# Patient Record
Sex: MALE | Race: WHITE | Employment: UNEMPLOYED | URBAN - METROPOLITAN AREA
[De-identification: names, ages, dates, MRNs, and addresses within clinical notes are randomized per-mention and may not be internally consistent; named-entity substitution may affect disease eponyms.]

---

## 2017-01-01 ENCOUNTER — APPOINTMENT (OUTPATIENT)
Dept: GENERAL RADIOLOGY | Age: 62
DRG: 190 | End: 2017-01-01
Attending: FAMILY MEDICINE
Payer: COMMERCIAL

## 2017-01-01 ENCOUNTER — APPOINTMENT (OUTPATIENT)
Dept: CT IMAGING | Age: 62
End: 2017-01-01
Attending: EMERGENCY MEDICINE
Payer: COMMERCIAL

## 2017-01-01 ENCOUNTER — HOSPITAL ENCOUNTER (OUTPATIENT)
Dept: GENERAL RADIOLOGY | Age: 62
Discharge: HOME | End: 2017-07-11
Payer: COMMERCIAL

## 2017-01-01 ENCOUNTER — APPOINTMENT (OUTPATIENT)
Dept: CT IMAGING | Age: 62
DRG: 190 | End: 2017-01-01
Attending: INTERNAL MEDICINE
Payer: COMMERCIAL

## 2017-01-01 ENCOUNTER — APPOINTMENT (OUTPATIENT)
Dept: MRI IMAGING | Age: 62
DRG: 190 | End: 2017-01-01
Attending: INTERNAL MEDICINE
Payer: COMMERCIAL

## 2017-01-01 ENCOUNTER — HOSPITAL ENCOUNTER (OUTPATIENT)
Dept: MRI IMAGING | Age: 62
Discharge: HOME | End: 2017-02-09
Attending: INTERNAL MEDICINE
Payer: COMMERCIAL

## 2017-01-01 ENCOUNTER — HOSPITAL ENCOUNTER (EMERGENCY)
Age: 62
Discharge: HOME | End: 2017-10-09
Attending: EMERGENCY MEDICINE | Admitting: EMERGENCY MEDICINE
Payer: COMMERCIAL

## 2017-01-01 ENCOUNTER — APPOINTMENT (OUTPATIENT)
Dept: GENERAL RADIOLOGY | Age: 62
DRG: 190 | End: 2017-01-01
Attending: EMERGENCY MEDICINE
Payer: COMMERCIAL

## 2017-01-01 ENCOUNTER — OFFICE VISIT (OUTPATIENT)
Dept: INTERNAL MEDICINE CLINIC | Age: 62
End: 2017-01-01

## 2017-01-01 ENCOUNTER — APPOINTMENT (OUTPATIENT)
Dept: CT IMAGING | Age: 62
DRG: 190 | End: 2017-01-01
Attending: EMERGENCY MEDICINE
Payer: COMMERCIAL

## 2017-01-01 ENCOUNTER — TELEPHONE (OUTPATIENT)
Dept: INTERNAL MEDICINE CLINIC | Age: 62
End: 2017-01-01

## 2017-01-01 ENCOUNTER — TELEPHONE (OUTPATIENT)
Dept: CASE MANAGEMENT | Age: 62
End: 2017-01-01

## 2017-01-01 ENCOUNTER — APPOINTMENT (OUTPATIENT)
Dept: GENERAL RADIOLOGY | Age: 62
End: 2017-01-01
Attending: EMERGENCY MEDICINE
Payer: COMMERCIAL

## 2017-01-01 ENCOUNTER — HOSPITAL ENCOUNTER (EMERGENCY)
Age: 62
Discharge: HOME | End: 2017-07-29
Attending: EMERGENCY MEDICINE
Payer: COMMERCIAL

## 2017-01-01 ENCOUNTER — HOSPITAL ENCOUNTER (INPATIENT)
Age: 62
LOS: 11 days | Discharge: NURSING, REHAB OR LONG-TERM CARE | DRG: 190 | End: 2017-11-16
Attending: EMERGENCY MEDICINE | Admitting: INTERNAL MEDICINE
Payer: COMMERCIAL

## 2017-01-01 VITALS
RESPIRATION RATE: 18 BRPM | BODY MASS INDEX: 22.31 KG/M2 | OXYGEN SATURATION: 96 % | TEMPERATURE: 97.3 F | HEART RATE: 100 BPM | WEIGHT: 164.7 LBS | SYSTOLIC BLOOD PRESSURE: 127 MMHG | DIASTOLIC BLOOD PRESSURE: 77 MMHG | HEIGHT: 72 IN

## 2017-01-01 VITALS
BODY MASS INDEX: 23.05 KG/M2 | TEMPERATURE: 97.8 F | SYSTOLIC BLOOD PRESSURE: 120 MMHG | RESPIRATION RATE: 16 BRPM | HEIGHT: 72 IN | OXYGEN SATURATION: 96 % | HEART RATE: 88 BPM | DIASTOLIC BLOOD PRESSURE: 77 MMHG | WEIGHT: 170.2 LBS

## 2017-01-01 VITALS
WEIGHT: 157 LBS | BODY MASS INDEX: 21.26 KG/M2 | OXYGEN SATURATION: 97 % | HEART RATE: 74 BPM | RESPIRATION RATE: 24 BRPM | HEIGHT: 72 IN | TEMPERATURE: 97.6 F | DIASTOLIC BLOOD PRESSURE: 76 MMHG | SYSTOLIC BLOOD PRESSURE: 121 MMHG

## 2017-01-01 VITALS
HEIGHT: 72 IN | OXYGEN SATURATION: 97 % | HEART RATE: 96 BPM | TEMPERATURE: 98 F | RESPIRATION RATE: 18 BRPM | BODY MASS INDEX: 21.8 KG/M2 | WEIGHT: 160.94 LBS | DIASTOLIC BLOOD PRESSURE: 80 MMHG | SYSTOLIC BLOOD PRESSURE: 136 MMHG

## 2017-01-01 VITALS
WEIGHT: 157.6 LBS | HEART RATE: 87 BPM | SYSTOLIC BLOOD PRESSURE: 103 MMHG | BODY MASS INDEX: 21.35 KG/M2 | DIASTOLIC BLOOD PRESSURE: 67 MMHG | HEIGHT: 72 IN | OXYGEN SATURATION: 95 % | TEMPERATURE: 97.7 F | RESPIRATION RATE: 16 BRPM

## 2017-01-01 DIAGNOSIS — J44.1 COPD EXACERBATION (HCC): ICD-10-CM

## 2017-01-01 DIAGNOSIS — C34.11 MALIGNANT NEOPLASM OF UPPER LOBE OF RIGHT LUNG (HCC): ICD-10-CM

## 2017-01-01 DIAGNOSIS — L08.9 SKIN INFECTION: ICD-10-CM

## 2017-01-01 DIAGNOSIS — R51.9 HEADACHE: ICD-10-CM

## 2017-01-01 DIAGNOSIS — E87.1 HYPONATREMIA: ICD-10-CM

## 2017-01-01 DIAGNOSIS — R55 SYNCOPE AND COLLAPSE: ICD-10-CM

## 2017-01-01 DIAGNOSIS — I10 ESSENTIAL HYPERTENSION: Primary | ICD-10-CM

## 2017-01-01 DIAGNOSIS — J15.8 PNEUMONIA DUE TO OTHER SPECIFIED BACTERIA (HCC): ICD-10-CM

## 2017-01-01 DIAGNOSIS — C34.11 MALIGNANT NEOPLASM OF UPPER LOBE OF RIGHT LUNG (HCC): Primary | ICD-10-CM

## 2017-01-01 DIAGNOSIS — C78.1 SECONDARY MALIGNANT NEOPLASM OF MEDIASTINUM (HCC): ICD-10-CM

## 2017-01-01 DIAGNOSIS — R42 DIZZINESS: ICD-10-CM

## 2017-01-01 DIAGNOSIS — J44.1 ACUTE EXACERBATION OF CHRONIC OBSTRUCTIVE PULMONARY DISEASE (COPD) (HCC): ICD-10-CM

## 2017-01-01 DIAGNOSIS — R06.02 SHORTNESS OF BREATH: ICD-10-CM

## 2017-01-01 DIAGNOSIS — E87.1 HYPONATREMIA: Primary | ICD-10-CM

## 2017-01-01 DIAGNOSIS — D70.1 AGRANULOCYTOSIS SECONDARY TO CANCER CHEMOTHERAPY (CODE) (HCC): ICD-10-CM

## 2017-01-01 DIAGNOSIS — R11.0 NAUSEA: ICD-10-CM

## 2017-01-01 DIAGNOSIS — J44.1 ACUTE EXACERBATION OF CHRONIC OBSTRUCTIVE PULMONARY DISEASE (COPD) (HCC): Primary | ICD-10-CM

## 2017-01-01 DIAGNOSIS — R51.9 HEAD ACHE: ICD-10-CM

## 2017-01-01 DIAGNOSIS — C34.90 MALIGNANT NEOPLASM OF LUNG, UNSPECIFIED LATERALITY, UNSPECIFIED PART OF LUNG (HCC): ICD-10-CM

## 2017-01-01 DIAGNOSIS — E53.8 B12 DEFICIENCY: ICD-10-CM

## 2017-01-01 DIAGNOSIS — J16.8: ICD-10-CM

## 2017-01-01 DIAGNOSIS — M54.40 MIDLINE LOW BACK PAIN WITH SCIATICA, SCIATICA LATERALITY UNSPECIFIED, UNSPECIFIED CHRONICITY: ICD-10-CM

## 2017-01-01 DIAGNOSIS — I10 ESSENTIAL HYPERTENSION: ICD-10-CM

## 2017-01-01 DIAGNOSIS — J44.9 CHRONIC OBSTRUCTIVE PULMONARY DISEASE, UNSPECIFIED COPD TYPE (HCC): ICD-10-CM

## 2017-01-01 DIAGNOSIS — R65.10 SIRS (SYSTEMIC INFLAMMATORY RESPONSE SYNDROME) (HCC): ICD-10-CM

## 2017-01-01 DIAGNOSIS — R53.81 DEBILITY: ICD-10-CM

## 2017-01-01 DIAGNOSIS — R42 DIZZINESS AND GIDDINESS: ICD-10-CM

## 2017-01-01 DIAGNOSIS — Z51.89 VISIT FOR WOUND CHECK: Primary | ICD-10-CM

## 2017-01-01 DIAGNOSIS — E78.5 HYPERLIPIDEMIA, UNSPECIFIED HYPERLIPIDEMIA TYPE: ICD-10-CM

## 2017-01-01 LAB
ALBUMIN SERPL-MCNC: 2.6 G/DL (ref 3.5–5)
ALBUMIN SERPL-MCNC: 2.9 G/DL (ref 3.5–5)
ALBUMIN SERPL-MCNC: 3.3 G/DL (ref 3.5–5)
ALBUMIN SERPL-MCNC: 3.6 G/DL (ref 3.5–5)
ALBUMIN SERPL-MCNC: 3.9 G/DL (ref 3.5–5.5)
ALBUMIN/GLOB SERPL: 0.8 {RATIO} (ref 1.1–2.2)
ALBUMIN/GLOB SERPL: 0.9 {RATIO} (ref 1.1–2.2)
ALBUMIN/GLOB SERPL: 1.6 {RATIO} (ref 1.2–2.2)
ALP SERPL-CCNC: 47 U/L (ref 45–117)
ALP SERPL-CCNC: 52 IU/L (ref 39–117)
ALP SERPL-CCNC: 53 U/L (ref 45–117)
ALP SERPL-CCNC: 62 U/L (ref 45–117)
ALP SERPL-CCNC: 66 U/L (ref 45–117)
ALT SERPL-CCNC: 11 IU/L (ref 0–44)
ALT SERPL-CCNC: 17 U/L (ref 12–78)
ALT SERPL-CCNC: 25 U/L (ref 12–78)
ALT SERPL-CCNC: 34 U/L (ref 12–78)
ALT SERPL-CCNC: 50 U/L (ref 12–78)
ANION GAP SERPL CALC-SCNC: 10 MMOL/L (ref 5–15)
ANION GAP SERPL CALC-SCNC: 10 MMOL/L (ref 5–15)
ANION GAP SERPL CALC-SCNC: 5 MMOL/L (ref 5–15)
ANION GAP SERPL CALC-SCNC: 8 MMOL/L (ref 5–15)
ANION GAP SERPL CALC-SCNC: 8 MMOL/L (ref 5–15)
APPEARANCE UR: CLEAR
ARTERIAL PATENCY WRIST A: YES
ARTERIAL PATENCY WRIST A: YES
AST SERPL-CCNC: 11 U/L (ref 15–37)
AST SERPL-CCNC: 17 IU/L (ref 0–40)
AST SERPL-CCNC: 17 U/L (ref 15–37)
AST SERPL-CCNC: 28 U/L (ref 15–37)
AST SERPL-CCNC: 33 U/L (ref 15–37)
ATRIAL RATE: 101 BPM
ATRIAL RATE: 92 BPM
BACTERIA SPEC CULT: NORMAL
BACTERIA SPEC CULT: NORMAL
BACTERIA URNS QL MICRO: NEGATIVE /HPF
BASE DEFICIT BLDA-SCNC: 1.4 MMOL/L
BASE DEFICIT BLDA-SCNC: 3.8 MMOL/L
BASOPHILS # BLD: 0 K/UL (ref 0–0.1)
BASOPHILS NFR BLD: 0 % (ref 0–1)
BDY SITE: ABNORMAL
BDY SITE: NORMAL
BILIRUB SERPL-MCNC: 0.3 MG/DL (ref 0.2–1)
BILIRUB SERPL-MCNC: 0.5 MG/DL (ref 0.2–1)
BILIRUB SERPL-MCNC: 0.5 MG/DL (ref 0–1.2)
BILIRUB UR QL: NEGATIVE
BUN SERPL-MCNC: 10 MG/DL (ref 6–20)
BUN SERPL-MCNC: 10 MG/DL (ref 6–20)
BUN SERPL-MCNC: 14 MG/DL (ref 6–20)
BUN SERPL-MCNC: 19 MG/DL (ref 6–20)
BUN SERPL-MCNC: 51 MG/DL (ref 6–20)
BUN SERPL-MCNC: 8 MG/DL (ref 6–24)
BUN/CREAT SERPL: 13 (ref 12–20)
BUN/CREAT SERPL: 19 (ref 12–20)
BUN/CREAT SERPL: 43 (ref 12–20)
BUN/CREAT SERPL: 7 (ref 9–20)
BUN/CREAT SERPL: 9 (ref 12–20)
BUN/CREAT SERPL: 9 (ref 12–20)
CALCIUM SERPL-MCNC: 7.8 MG/DL (ref 8.5–10.1)
CALCIUM SERPL-MCNC: 7.9 MG/DL (ref 8.5–10.1)
CALCIUM SERPL-MCNC: 8 MG/DL (ref 8.5–10.1)
CALCIUM SERPL-MCNC: 8.7 MG/DL (ref 8.5–10.1)
CALCIUM SERPL-MCNC: 9.1 MG/DL (ref 8.7–10.2)
CALCIUM SERPL-MCNC: 9.3 MG/DL (ref 8.5–10.1)
CALCULATED P AXIS, ECG09: 76 DEGREES
CALCULATED P AXIS, ECG09: 83 DEGREES
CALCULATED R AXIS, ECG10: 97 DEGREES
CALCULATED R AXIS, ECG10: 99 DEGREES
CALCULATED T AXIS, ECG11: 52 DEGREES
CALCULATED T AXIS, ECG11: 55 DEGREES
CHLORIDE SERPL-SCNC: 102 MMOL/L (ref 97–108)
CHLORIDE SERPL-SCNC: 102 MMOL/L (ref 97–108)
CHLORIDE SERPL-SCNC: 106 MMOL/L (ref 97–108)
CHLORIDE SERPL-SCNC: 93 MMOL/L (ref 96–106)
CHLORIDE SERPL-SCNC: 94 MMOL/L (ref 97–108)
CHLORIDE SERPL-SCNC: 96 MMOL/L (ref 97–108)
CHOLEST SERPL-MCNC: 183 MG/DL (ref 100–199)
CK MB CFR SERPL CALC: 3.2 % (ref 0–2.5)
CK MB SERPL-MCNC: 2.4 NG/ML (ref 5–25)
CK SERPL-CCNC: 32 U/L (ref 39–308)
CK SERPL-CCNC: 75 U/L (ref 39–308)
CO2 SERPL-SCNC: 22 MMOL/L (ref 18–29)
CO2 SERPL-SCNC: 22 MMOL/L (ref 21–32)
CO2 SERPL-SCNC: 26 MMOL/L (ref 21–32)
CO2 SERPL-SCNC: 26 MMOL/L (ref 21–32)
CO2 SERPL-SCNC: 27 MMOL/L (ref 21–32)
CO2 SERPL-SCNC: 28 MMOL/L (ref 21–32)
COLOR UR: NORMAL
CREAT SERPL-MCNC: 0.98 MG/DL (ref 0.7–1.3)
CREAT SERPL-MCNC: 1.05 MG/DL (ref 0.7–1.3)
CREAT SERPL-MCNC: 1.12 MG/DL (ref 0.7–1.3)
CREAT SERPL-MCNC: 1.15 MG/DL (ref 0.7–1.3)
CREAT SERPL-MCNC: 1.18 MG/DL (ref 0.76–1.27)
CREAT SERPL-MCNC: 1.18 MG/DL (ref 0.7–1.3)
DIAGNOSIS, 93000: NORMAL
DIAGNOSIS, 93000: NORMAL
DIFFERENTIAL METHOD BLD: ABNORMAL
DIFFERENTIAL METHOD BLD: ABNORMAL
EOSINOPHIL # BLD: 0 K/UL (ref 0–0.4)
EOSINOPHIL # BLD: 0.1 K/UL (ref 0–0.4)
EOSINOPHIL NFR BLD: 0 % (ref 0–7)
EOSINOPHIL NFR BLD: 1 % (ref 0–7)
EPITH CASTS URNS QL MICRO: NORMAL /LPF
ERYTHROCYTE [DISTWIDTH] IN BLOOD BY AUTOMATED COUNT: 13.3 % (ref 11.5–14.5)
ERYTHROCYTE [DISTWIDTH] IN BLOOD BY AUTOMATED COUNT: 13.4 % (ref 11.5–14.5)
ERYTHROCYTE [DISTWIDTH] IN BLOOD BY AUTOMATED COUNT: 13.5 % (ref 11.5–14.5)
ERYTHROCYTE [DISTWIDTH] IN BLOOD BY AUTOMATED COUNT: 13.6 % (ref 11.5–14.5)
ERYTHROCYTE [DISTWIDTH] IN BLOOD BY AUTOMATED COUNT: 13.7 % (ref 11.5–14.5)
FLUAV AG NPH QL IA: NEGATIVE
FLUBV AG NOSE QL IA: NEGATIVE
GLOBULIN SER CALC-MCNC: 2.4 G/DL (ref 1.5–4.5)
GLOBULIN SER CALC-MCNC: 3.1 G/DL (ref 2–4)
GLOBULIN SER CALC-MCNC: 3.3 G/DL (ref 2–4)
GLOBULIN SER CALC-MCNC: 3.5 G/DL (ref 2–4)
GLOBULIN SER CALC-MCNC: 4.2 G/DL (ref 2–4)
GLUCOSE SERPL-MCNC: 120 MG/DL (ref 65–100)
GLUCOSE SERPL-MCNC: 137 MG/DL (ref 65–100)
GLUCOSE SERPL-MCNC: 233 MG/DL (ref 65–100)
GLUCOSE SERPL-MCNC: 73 MG/DL (ref 65–100)
GLUCOSE SERPL-MCNC: 78 MG/DL (ref 65–99)
GLUCOSE SERPL-MCNC: 82 MG/DL (ref 65–100)
GLUCOSE UR STRIP.AUTO-MCNC: NEGATIVE MG/DL
HCO3 BLDA-SCNC: 20 MMOL/L (ref 22–26)
HCO3 BLDA-SCNC: 23 MMOL/L (ref 22–26)
HCT VFR BLD AUTO: 31 % (ref 36.6–50.3)
HCT VFR BLD AUTO: 34.2 % (ref 36.6–50.3)
HCT VFR BLD AUTO: 36.1 % (ref 36.6–50.3)
HCT VFR BLD AUTO: 38.7 % (ref 36.6–50.3)
HCT VFR BLD AUTO: 42.1 % (ref 36.6–50.3)
HDLC SERPL-MCNC: 120 MG/DL
HGB BLD-MCNC: 10.7 G/DL (ref 12.1–17)
HGB BLD-MCNC: 12 G/DL (ref 12.1–17)
HGB BLD-MCNC: 13 G/DL (ref 12.1–17)
HGB BLD-MCNC: 13.5 G/DL (ref 12.1–17)
HGB BLD-MCNC: 14.7 G/DL (ref 12.1–17)
HGB UR QL STRIP: NEGATIVE
HYALINE CASTS URNS QL MICRO: NORMAL /LPF (ref 0–5)
KETONES UR QL STRIP.AUTO: NEGATIVE MG/DL
LACTATE SERPL-SCNC: 1.4 MMOL/L (ref 0.4–2)
LACTATE SERPL-SCNC: 2.9 MMOL/L (ref 0.4–2)
LACTATE SERPL-SCNC: 3.9 MMOL/L (ref 0.4–2)
LDLC SERPL CALC-MCNC: 51 MG/DL (ref 0–99)
LEUKOCYTE ESTERASE UR QL STRIP.AUTO: NEGATIVE
LYMPHOCYTES # BLD: 0.2 K/UL (ref 0.8–3.5)
LYMPHOCYTES # BLD: 0.3 K/UL (ref 0.8–3.5)
LYMPHOCYTES # BLD: 0.3 K/UL (ref 0.8–3.5)
LYMPHOCYTES # BLD: 1.5 K/UL (ref 0.8–3.5)
LYMPHOCYTES # BLD: 1.5 K/UL (ref 0.8–3.5)
LYMPHOCYTES NFR BLD: 17 % (ref 12–49)
LYMPHOCYTES NFR BLD: 18 % (ref 12–49)
LYMPHOCYTES NFR BLD: 2 % (ref 12–49)
LYMPHOCYTES NFR BLD: 4 % (ref 12–49)
LYMPHOCYTES NFR BLD: 5 % (ref 12–49)
MAGNESIUM SERPL-MCNC: 1.7 MG/DL (ref 1.6–2.4)
MCH RBC QN AUTO: 33.3 PG (ref 26–34)
MCH RBC QN AUTO: 33.7 PG (ref 26–34)
MCH RBC QN AUTO: 34.6 PG (ref 26–34)
MCHC RBC AUTO-ENTMCNC: 34.5 G/DL (ref 30–36.5)
MCHC RBC AUTO-ENTMCNC: 34.9 G/DL (ref 30–36.5)
MCHC RBC AUTO-ENTMCNC: 34.9 G/DL (ref 30–36.5)
MCHC RBC AUTO-ENTMCNC: 35.1 G/DL (ref 30–36.5)
MCHC RBC AUTO-ENTMCNC: 36 G/DL (ref 30–36.5)
MCV RBC AUTO: 95 FL (ref 80–99)
MCV RBC AUTO: 95.5 FL (ref 80–99)
MCV RBC AUTO: 96 FL (ref 80–99)
MCV RBC AUTO: 96.5 FL (ref 80–99)
MCV RBC AUTO: 96.6 FL (ref 80–99)
MONOCYTES # BLD: 0.1 K/UL (ref 0–1)
MONOCYTES # BLD: 0.3 K/UL (ref 0–1)
MONOCYTES # BLD: 0.6 K/UL (ref 0–1)
MONOCYTES # BLD: 0.6 K/UL (ref 0–1)
MONOCYTES # BLD: 0.7 K/UL (ref 0–1)
MONOCYTES NFR BLD: 1 % (ref 5–13)
MONOCYTES NFR BLD: 4 % (ref 5–13)
MONOCYTES NFR BLD: 7 % (ref 5–13)
NEUTS SEG # BLD: 4.6 K/UL (ref 1.8–8)
NEUTS SEG # BLD: 6.4 K/UL (ref 1.8–8)
NEUTS SEG # BLD: 6.7 K/UL (ref 1.8–8)
NEUTS SEG # BLD: 6.8 K/UL (ref 1.8–8)
NEUTS SEG # BLD: 9.1 K/UL (ref 1.8–8)
NEUTS SEG NFR BLD: 75 % (ref 32–75)
NEUTS SEG NFR BLD: 75 % (ref 32–75)
NEUTS SEG NFR BLD: 91 % (ref 32–75)
NEUTS SEG NFR BLD: 92 % (ref 32–75)
NEUTS SEG NFR BLD: 94 % (ref 32–75)
NITRITE UR QL STRIP.AUTO: NEGATIVE
P-R INTERVAL, ECG05: 126 MS
P-R INTERVAL, ECG05: 134 MS
PCO2 BLDA: 31 MMHG (ref 35–45)
PCO2 BLDA: 37 MMHG (ref 35–45)
PH BLDA: 7.41 [PH] (ref 7.35–7.45)
PH BLDA: 7.42 [PH] (ref 7.35–7.45)
PH UR STRIP: 5.5 [PH] (ref 5–8)
PHOSPHATE SERPL-MCNC: 2.8 MG/DL (ref 2.6–4.7)
PLATELET # BLD AUTO: 171 K/UL (ref 150–400)
PLATELET # BLD AUTO: 196 K/UL (ref 150–400)
PLATELET # BLD AUTO: 203 K/UL (ref 150–400)
PLATELET # BLD AUTO: 226 K/UL (ref 150–400)
PLATELET # BLD AUTO: 246 K/UL (ref 150–400)
PO2 BLDA: 86 MMHG (ref 80–100)
PO2 BLDA: 93 MMHG (ref 80–100)
POTASSIUM SERPL-SCNC: 3.8 MMOL/L (ref 3.5–5.1)
POTASSIUM SERPL-SCNC: 4 MMOL/L (ref 3.5–5.1)
POTASSIUM SERPL-SCNC: 4.2 MMOL/L (ref 3.5–5.1)
POTASSIUM SERPL-SCNC: 4.3 MMOL/L (ref 3.5–5.1)
POTASSIUM SERPL-SCNC: 4.4 MMOL/L (ref 3.5–5.1)
POTASSIUM SERPL-SCNC: 4.4 MMOL/L (ref 3.5–5.2)
PROT SERPL-MCNC: 5.9 G/DL (ref 6.4–8.2)
PROT SERPL-MCNC: 6 G/DL (ref 6.4–8.2)
PROT SERPL-MCNC: 6.3 G/DL (ref 6–8.5)
PROT SERPL-MCNC: 6.8 G/DL (ref 6.4–8.2)
PROT SERPL-MCNC: 7.8 G/DL (ref 6.4–8.2)
PROT UR STRIP-MCNC: NEGATIVE MG/DL
Q-T INTERVAL, ECG07: 346 MS
Q-T INTERVAL, ECG07: 350 MS
QRS DURATION, ECG06: 86 MS
QRS DURATION, ECG06: 94 MS
QTC CALCULATION (BEZET), ECG08: 432 MS
QTC CALCULATION (BEZET), ECG08: 448 MS
RBC # BLD AUTO: 3.21 M/UL (ref 4.1–5.7)
RBC # BLD AUTO: 3.6 M/UL (ref 4.1–5.7)
RBC # BLD AUTO: 3.76 M/UL (ref 4.1–5.7)
RBC # BLD AUTO: 4.01 M/UL (ref 4.1–5.7)
RBC # BLD AUTO: 4.41 M/UL (ref 4.1–5.7)
RBC #/AREA URNS HPF: NORMAL /HPF (ref 0–5)
RBC MORPH BLD: ABNORMAL
RBC MORPH BLD: ABNORMAL
SAO2 % BLD: 97 % (ref 92–97)
SAO2 % BLD: 97 % (ref 92–97)
SAO2% DEVICE SAO2% SENSOR NAME: ABNORMAL
SAO2% DEVICE SAO2% SENSOR NAME: NORMAL
SERVICE CMNT-IMP: NORMAL
SERVICE CMNT-IMP: NORMAL
SODIUM SERPL-SCNC: 130 MMOL/L (ref 136–145)
SODIUM SERPL-SCNC: 131 MMOL/L (ref 136–145)
SODIUM SERPL-SCNC: 134 MMOL/L (ref 134–144)
SODIUM SERPL-SCNC: 134 MMOL/L (ref 136–145)
SODIUM SERPL-SCNC: 136 MMOL/L (ref 136–145)
SODIUM SERPL-SCNC: 139 MMOL/L (ref 136–145)
SP GR UR REFRACTOMETRY: 1.02 (ref 1–1.03)
SPECIMEN SITE: ABNORMAL
SPECIMEN SITE: NORMAL
TRIGL SERPL-MCNC: 60 MG/DL (ref 0–149)
TROPONIN I SERPL-MCNC: <0.04 NG/ML
UA: UC IF INDICATED,UAUC: NORMAL
UROBILINOGEN UR QL STRIP.AUTO: 0.2 EU/DL (ref 0.2–1)
VENTRICULAR RATE, ECG03: 101 BPM
VENTRICULAR RATE, ECG03: 92 BPM
VIT B12 SERPL-MCNC: 166 PG/ML (ref 211–911)
VLDLC SERPL CALC-MCNC: 12 MG/DL (ref 5–40)
WBC # BLD AUTO: 10 K/UL (ref 4.1–11.1)
WBC # BLD AUTO: 5 K/UL (ref 4.1–11.1)
WBC # BLD AUTO: 7.4 K/UL (ref 4.1–11.1)
WBC # BLD AUTO: 8.6 K/UL (ref 4.1–11.1)
WBC # BLD AUTO: 9 K/UL (ref 4.1–11.1)
WBC URNS QL MICRO: NORMAL /HPF (ref 0–4)

## 2017-01-01 PROCEDURE — 74011000250 HC RX REV CODE- 250: Performed by: INTERNAL MEDICINE

## 2017-01-01 PROCEDURE — 81001 URINALYSIS AUTO W/SCOPE: CPT | Performed by: EMERGENCY MEDICINE

## 2017-01-01 PROCEDURE — 74011250637 HC RX REV CODE- 250/637: Performed by: INTERNAL MEDICINE

## 2017-01-01 PROCEDURE — 74011250636 HC RX REV CODE- 250/636: Performed by: INTERNAL MEDICINE

## 2017-01-01 PROCEDURE — 71275 CT ANGIOGRAPHY CHEST: CPT

## 2017-01-01 PROCEDURE — 99283 EMERGENCY DEPT VISIT LOW MDM: CPT

## 2017-01-01 PROCEDURE — 82607 VITAMIN B-12: CPT | Performed by: INTERNAL MEDICINE

## 2017-01-01 PROCEDURE — 80053 COMPREHEN METABOLIC PANEL: CPT | Performed by: FAMILY MEDICINE

## 2017-01-01 PROCEDURE — 74011636320 HC RX REV CODE- 636/320: Performed by: EMERGENCY MEDICINE

## 2017-01-01 PROCEDURE — G8979 MOBILITY GOAL STATUS: HCPCS

## 2017-01-01 PROCEDURE — 97530 THERAPEUTIC ACTIVITIES: CPT

## 2017-01-01 PROCEDURE — G8987 SELF CARE CURRENT STATUS: HCPCS | Performed by: OCCUPATIONAL THERAPIST

## 2017-01-01 PROCEDURE — G8988 SELF CARE GOAL STATUS: HCPCS | Performed by: OCCUPATIONAL THERAPIST

## 2017-01-01 PROCEDURE — 36600 WITHDRAWAL OF ARTERIAL BLOOD: CPT | Performed by: INTERNAL MEDICINE

## 2017-01-01 PROCEDURE — 74011250637 HC RX REV CODE- 250/637: Performed by: EMERGENCY MEDICINE

## 2017-01-01 PROCEDURE — 36415 COLL VENOUS BLD VENIPUNCTURE: CPT | Performed by: FAMILY MEDICINE

## 2017-01-01 PROCEDURE — 71010 XR CHEST PORT: CPT

## 2017-01-01 PROCEDURE — 77030029684 HC NEB SM VOL KT MONA -A

## 2017-01-01 PROCEDURE — 65660000000 HC RM CCU STEPDOWN

## 2017-01-01 PROCEDURE — 74011000250 HC RX REV CODE- 250: Performed by: PHYSICIAN ASSISTANT

## 2017-01-01 PROCEDURE — 84484 ASSAY OF TROPONIN QUANT: CPT | Performed by: INTERNAL MEDICINE

## 2017-01-01 PROCEDURE — 36415 COLL VENOUS BLD VENIPUNCTURE: CPT | Performed by: INTERNAL MEDICINE

## 2017-01-01 PROCEDURE — 74011250636 HC RX REV CODE- 250/636

## 2017-01-01 PROCEDURE — 83605 ASSAY OF LACTIC ACID: CPT | Performed by: INTERNAL MEDICINE

## 2017-01-01 PROCEDURE — 70450 CT HEAD/BRAIN W/O DYE: CPT

## 2017-01-01 PROCEDURE — 80053 COMPREHEN METABOLIC PANEL: CPT | Performed by: EMERGENCY MEDICINE

## 2017-01-01 PROCEDURE — 82550 ASSAY OF CK (CPK): CPT | Performed by: EMERGENCY MEDICINE

## 2017-01-01 PROCEDURE — 94640 AIRWAY INHALATION TREATMENT: CPT

## 2017-01-01 PROCEDURE — 96372 THER/PROPH/DIAG INJ SC/IM: CPT

## 2017-01-01 PROCEDURE — G8978 MOBILITY CURRENT STATUS: HCPCS

## 2017-01-01 PROCEDURE — 96360 HYDRATION IV INFUSION INIT: CPT

## 2017-01-01 PROCEDURE — 83605 ASSAY OF LACTIC ACID: CPT | Performed by: EMERGENCY MEDICINE

## 2017-01-01 PROCEDURE — 84100 ASSAY OF PHOSPHORUS: CPT | Performed by: INTERNAL MEDICINE

## 2017-01-01 PROCEDURE — 97165 OT EVAL LOW COMPLEX 30 MIN: CPT | Performed by: OCCUPATIONAL THERAPIST

## 2017-01-01 PROCEDURE — 84484 ASSAY OF TROPONIN QUANT: CPT | Performed by: EMERGENCY MEDICINE

## 2017-01-01 PROCEDURE — 97530 THERAPEUTIC ACTIVITIES: CPT | Performed by: PHYSICAL THERAPIST

## 2017-01-01 PROCEDURE — 74011000250 HC RX REV CODE- 250: Performed by: EMERGENCY MEDICINE

## 2017-01-01 PROCEDURE — 83735 ASSAY OF MAGNESIUM: CPT | Performed by: INTERNAL MEDICINE

## 2017-01-01 PROCEDURE — 97162 PT EVAL MOD COMPLEX 30 MIN: CPT

## 2017-01-01 PROCEDURE — 70553 MRI BRAIN STEM W/O & W/DYE: CPT

## 2017-01-01 PROCEDURE — 97116 GAIT TRAINING THERAPY: CPT | Performed by: PHYSICAL THERAPIST

## 2017-01-01 PROCEDURE — 99284 EMERGENCY DEPT VISIT MOD MDM: CPT

## 2017-01-01 PROCEDURE — 74011250636 HC RX REV CODE- 250/636: Performed by: PHYSICIAN ASSISTANT

## 2017-01-01 PROCEDURE — 85025 COMPLETE CBC W/AUTO DIFF WBC: CPT | Performed by: EMERGENCY MEDICINE

## 2017-01-01 PROCEDURE — 87804 INFLUENZA ASSAY W/OPTIC: CPT | Performed by: INTERNAL MEDICINE

## 2017-01-01 PROCEDURE — 76450000000

## 2017-01-01 PROCEDURE — 80048 BASIC METABOLIC PNL TOTAL CA: CPT | Performed by: INTERNAL MEDICINE

## 2017-01-01 PROCEDURE — A9576 INJ PROHANCE MULTIPACK: HCPCS | Performed by: INTERNAL MEDICINE

## 2017-01-01 PROCEDURE — 82803 BLOOD GASES ANY COMBINATION: CPT | Performed by: INTERNAL MEDICINE

## 2017-01-01 PROCEDURE — 97530 THERAPEUTIC ACTIVITIES: CPT | Performed by: OCCUPATIONAL THERAPIST

## 2017-01-01 PROCEDURE — 85025 COMPLETE CBC W/AUTO DIFF WBC: CPT | Performed by: FAMILY MEDICINE

## 2017-01-01 PROCEDURE — 96361 HYDRATE IV INFUSION ADD-ON: CPT

## 2017-01-01 PROCEDURE — 96374 THER/PROPH/DIAG INJ IV PUSH: CPT

## 2017-01-01 PROCEDURE — 74011250636 HC RX REV CODE- 250/636: Performed by: EMERGENCY MEDICINE

## 2017-01-01 PROCEDURE — 99285 EMERGENCY DEPT VISIT HI MDM: CPT

## 2017-01-01 PROCEDURE — 36415 COLL VENOUS BLD VENIPUNCTURE: CPT | Performed by: EMERGENCY MEDICINE

## 2017-01-01 PROCEDURE — 93005 ELECTROCARDIOGRAM TRACING: CPT

## 2017-01-01 PROCEDURE — 71020 XR CHEST PA LAT: CPT

## 2017-01-01 PROCEDURE — 74011636637 HC RX REV CODE- 636/637: Performed by: EMERGENCY MEDICINE

## 2017-01-01 PROCEDURE — 85025 COMPLETE CBC W/AUTO DIFF WBC: CPT | Performed by: INTERNAL MEDICINE

## 2017-01-01 PROCEDURE — 93306 TTE W/DOPPLER COMPLETE: CPT

## 2017-01-01 PROCEDURE — 72158 MRI LUMBAR SPINE W/O & W/DYE: CPT

## 2017-01-01 PROCEDURE — 80053 COMPREHEN METABOLIC PANEL: CPT | Performed by: INTERNAL MEDICINE

## 2017-01-01 PROCEDURE — 74011250637 HC RX REV CODE- 250/637: Performed by: FAMILY MEDICINE

## 2017-01-01 PROCEDURE — A9585 GADOBUTROL INJECTION: HCPCS | Performed by: INTERNAL MEDICINE

## 2017-01-01 PROCEDURE — 94761 N-INVAS EAR/PLS OXIMETRY MLT: CPT

## 2017-01-01 PROCEDURE — 87040 BLOOD CULTURE FOR BACTERIA: CPT | Performed by: EMERGENCY MEDICINE

## 2017-01-01 RX ORDER — ALUMINA, MAGNESIA, AND SIMETHICONE 2400; 2400; 240 MG/30ML; MG/30ML; MG/30ML
30 SUSPENSION ORAL
Qty: 1 ML | Refills: 0 | Status: SHIPPED
Start: 2017-01-01

## 2017-01-01 RX ORDER — SODIUM CHLORIDE 9 MG/ML
1000 INJECTION, SOLUTION INTRAVENOUS ONCE
Status: COMPLETED | OUTPATIENT
Start: 2017-01-01 | End: 2017-01-01

## 2017-01-01 RX ORDER — NICOTINE 7MG/24HR
1 PATCH, TRANSDERMAL 24 HOURS TRANSDERMAL DAILY
Status: DISCONTINUED | OUTPATIENT
Start: 2017-01-01 | End: 2017-01-01 | Stop reason: HOSPADM

## 2017-01-01 RX ORDER — FLUTICASONE FUROATE AND VILANTEROL 100; 25 UG/1; UG/1
1 POWDER RESPIRATORY (INHALATION) DAILY
Qty: 1 INHALER | Refills: 0 | Status: SHIPPED
Start: 2017-01-01

## 2017-01-01 RX ORDER — LANOLIN ALCOHOL/MO/W.PET/CERES
100 CREAM (GRAM) TOPICAL DAILY
Status: DISCONTINUED | OUTPATIENT
Start: 2017-01-01 | End: 2017-01-01 | Stop reason: HOSPADM

## 2017-01-01 RX ORDER — TIOTROPIUM BROMIDE 18 UG/1
CAPSULE ORAL; RESPIRATORY (INHALATION)
Qty: 90 CAP | Refills: 2 | Status: SHIPPED | OUTPATIENT
Start: 2017-01-01

## 2017-01-01 RX ORDER — ALBUTEROL SULFATE 0.83 MG/ML
2.5 SOLUTION RESPIRATORY (INHALATION)
Status: DISCONTINUED | OUTPATIENT
Start: 2017-01-01 | End: 2017-01-01 | Stop reason: HOSPADM

## 2017-01-01 RX ORDER — LISINOPRIL 5 MG/1
10 TABLET ORAL DAILY
Status: DISCONTINUED | OUTPATIENT
Start: 2017-01-01 | End: 2017-01-01 | Stop reason: HOSPADM

## 2017-01-01 RX ORDER — ACETAMINOPHEN 325 MG/1
650 TABLET ORAL
Status: COMPLETED | OUTPATIENT
Start: 2017-01-01 | End: 2017-01-01

## 2017-01-01 RX ORDER — AMLODIPINE BESYLATE 5 MG/1
5 TABLET ORAL DAILY
Qty: 90 TAB | Refills: 3 | Status: SHIPPED | OUTPATIENT
Start: 2017-01-01 | End: 2017-01-01 | Stop reason: ALTCHOICE

## 2017-01-01 RX ORDER — ALBUTEROL SULFATE 0.83 MG/ML
SOLUTION RESPIRATORY (INHALATION)
Qty: 30 PACKAGE | Refills: 0 | Status: SHIPPED | OUTPATIENT
Start: 2017-01-01 | End: 2017-01-01 | Stop reason: SDUPTHER

## 2017-01-01 RX ORDER — SODIUM CHLORIDE 0.9 % (FLUSH) 0.9 %
5-10 SYRINGE (ML) INJECTION EVERY 8 HOURS
Status: DISCONTINUED | OUTPATIENT
Start: 2017-01-01 | End: 2017-01-01 | Stop reason: HOSPADM

## 2017-01-01 RX ORDER — ENOXAPARIN SODIUM 100 MG/ML
40 INJECTION SUBCUTANEOUS EVERY 24 HOURS
Status: DISCONTINUED | OUTPATIENT
Start: 2017-01-01 | End: 2017-01-01 | Stop reason: HOSPADM

## 2017-01-01 RX ORDER — DOXYCYCLINE HYCLATE 100 MG
100 TABLET ORAL 2 TIMES DAILY
Qty: 20 TAB | Refills: 0 | Status: SHIPPED | OUTPATIENT
Start: 2017-01-01 | End: 2017-01-01

## 2017-01-01 RX ORDER — SODIUM CHLORIDE 9 MG/ML
75 INJECTION, SOLUTION INTRAVENOUS CONTINUOUS
Status: DISCONTINUED | OUTPATIENT
Start: 2017-01-01 | End: 2017-01-01

## 2017-01-01 RX ORDER — SODIUM CHLORIDE 9 MG/ML
50 INJECTION, SOLUTION INTRAVENOUS
Status: COMPLETED | OUTPATIENT
Start: 2017-01-01 | End: 2017-01-01

## 2017-01-01 RX ORDER — LISINOPRIL 10 MG/1
10 TABLET ORAL DAILY
Qty: 90 TAB | Refills: 3 | Status: SHIPPED | OUTPATIENT
Start: 2017-01-01

## 2017-01-01 RX ORDER — ACETAMINOPHEN 325 MG/1
650 TABLET ORAL EVERY 8 HOURS
Status: DISCONTINUED | OUTPATIENT
Start: 2017-01-01 | End: 2017-01-01 | Stop reason: HOSPADM

## 2017-01-01 RX ORDER — BUDESONIDE 0.5 MG/2ML
500 INHALANT ORAL
Status: DISCONTINUED | OUTPATIENT
Start: 2017-01-01 | End: 2017-01-01

## 2017-01-01 RX ORDER — FOLIC ACID 1 MG/1
1 TABLET ORAL DAILY
Qty: 1 TAB | Refills: 0 | Status: SHIPPED
Start: 2017-01-01

## 2017-01-01 RX ORDER — OXYCODONE AND ACETAMINOPHEN 5; 325 MG/1; MG/1
1 TABLET ORAL
Qty: 10 TAB | Refills: 0 | Status: SHIPPED | OUTPATIENT
Start: 2017-01-01 | End: 2017-01-01

## 2017-01-01 RX ORDER — LIDOCAINE 50 MG/G
PATCH TOPICAL
Qty: 1 EACH | Refills: 0 | Status: SHIPPED
Start: 2017-01-01

## 2017-01-01 RX ORDER — IPRATROPIUM BROMIDE AND ALBUTEROL SULFATE 2.5; .5 MG/3ML; MG/3ML
3 SOLUTION RESPIRATORY (INHALATION) ONCE
Status: COMPLETED | OUTPATIENT
Start: 2017-01-01 | End: 2017-01-01

## 2017-01-01 RX ORDER — LANOLIN ALCOHOL/MO/W.PET/CERES
100 CREAM (GRAM) TOPICAL DAILY
Qty: 1 TAB | Refills: 0 | Status: SHIPPED
Start: 2017-01-01

## 2017-01-01 RX ORDER — IPRATROPIUM BROMIDE AND ALBUTEROL SULFATE 2.5; .5 MG/3ML; MG/3ML
3 SOLUTION RESPIRATORY (INHALATION)
Status: DISCONTINUED | OUTPATIENT
Start: 2017-01-01 | End: 2017-01-01

## 2017-01-01 RX ORDER — GUAIFENESIN 600 MG/1
600 TABLET, EXTENDED RELEASE ORAL EVERY 12 HOURS
Status: DISCONTINUED | OUTPATIENT
Start: 2017-01-01 | End: 2017-01-01 | Stop reason: HOSPADM

## 2017-01-01 RX ORDER — SODIUM CHLORIDE 0.9 % (FLUSH) 0.9 %
5-10 SYRINGE (ML) INJECTION AS NEEDED
Status: DISCONTINUED | OUTPATIENT
Start: 2017-01-01 | End: 2017-01-01

## 2017-01-01 RX ORDER — LIDOCAINE 50 MG/G
1 PATCH TOPICAL EVERY 24 HOURS
Status: DISCONTINUED | OUTPATIENT
Start: 2017-01-01 | End: 2017-01-01 | Stop reason: HOSPADM

## 2017-01-01 RX ORDER — ASPIRIN 325 MG
81 TABLET ORAL DAILY
Status: DISCONTINUED | OUTPATIENT
Start: 2017-01-01 | End: 2017-01-01

## 2017-01-01 RX ORDER — GABAPENTIN 100 MG/1
200 CAPSULE ORAL 2 TIMES DAILY
Status: DISCONTINUED | OUTPATIENT
Start: 2017-01-01 | End: 2017-01-01 | Stop reason: HOSPADM

## 2017-01-01 RX ORDER — IPRATROPIUM BROMIDE AND ALBUTEROL SULFATE 2.5; .5 MG/3ML; MG/3ML
3 SOLUTION RESPIRATORY (INHALATION) 4 TIMES DAILY
Qty: 30 NEBULE | Refills: 0 | Status: SHIPPED
Start: 2017-01-01

## 2017-01-01 RX ORDER — NICOTINE 7MG/24HR
1 PATCH, TRANSDERMAL 24 HOURS TRANSDERMAL DAILY
Qty: 30 PATCH | Refills: 0 | Status: SHIPPED
Start: 2017-01-01 | End: 2017-12-14

## 2017-01-01 RX ORDER — SODIUM CHLORIDE 0.9 % (FLUSH) 0.9 %
5-10 SYRINGE (ML) INJECTION AS NEEDED
Status: DISCONTINUED | OUTPATIENT
Start: 2017-01-01 | End: 2017-01-01 | Stop reason: HOSPADM

## 2017-01-01 RX ORDER — IPRATROPIUM BROMIDE AND ALBUTEROL SULFATE 2.5; .5 MG/3ML; MG/3ML
3 SOLUTION RESPIRATORY (INHALATION)
Status: DISPENSED | OUTPATIENT
Start: 2017-01-01 | End: 2017-01-01

## 2017-01-01 RX ORDER — DIAZEPAM 5 MG/1
10 TABLET ORAL
Status: DISCONTINUED | OUTPATIENT
Start: 2017-01-01 | End: 2017-01-01 | Stop reason: HOSPADM

## 2017-01-01 RX ORDER — PREDNISONE 10 MG/1
40 TABLET ORAL
Status: COMPLETED | OUTPATIENT
Start: 2017-01-01 | End: 2017-01-01

## 2017-01-01 RX ORDER — IPRATROPIUM BROMIDE AND ALBUTEROL SULFATE 2.5; .5 MG/3ML; MG/3ML
3 SOLUTION RESPIRATORY (INHALATION)
Status: DISCONTINUED | OUTPATIENT
Start: 2017-01-01 | End: 2017-01-01 | Stop reason: HOSPADM

## 2017-01-01 RX ORDER — DOXYCYCLINE HYCLATE 100 MG
100 TABLET ORAL
Status: COMPLETED | OUTPATIENT
Start: 2017-01-01 | End: 2017-01-01

## 2017-01-01 RX ORDER — DIAZEPAM 5 MG/1
5 TABLET ORAL
Status: DISCONTINUED | OUTPATIENT
Start: 2017-01-01 | End: 2017-01-01 | Stop reason: HOSPADM

## 2017-01-01 RX ORDER — GUAIFENESIN 600 MG/1
600 TABLET, EXTENDED RELEASE ORAL EVERY 12 HOURS
Qty: 1 TAB | Refills: 0 | Status: SHIPPED
Start: 2017-01-01

## 2017-01-01 RX ORDER — FLUTICASONE FUROATE AND VILANTEROL 100; 25 UG/1; UG/1
1 POWDER RESPIRATORY (INHALATION) DAILY
Status: DISCONTINUED | OUTPATIENT
Start: 2017-01-01 | End: 2017-01-01 | Stop reason: HOSPADM

## 2017-01-01 RX ORDER — HEPARIN 100 UNIT/ML
SYRINGE INTRAVENOUS
Status: COMPLETED
Start: 2017-01-01 | End: 2017-01-01

## 2017-01-01 RX ORDER — ALUMINA, MAGNESIA, AND SIMETHICONE 2400; 2400; 240 MG/30ML; MG/30ML; MG/30ML
30 SUSPENSION ORAL
Status: DISCONTINUED | OUTPATIENT
Start: 2017-01-01 | End: 2017-01-01 | Stop reason: HOSPADM

## 2017-01-01 RX ORDER — ALBUTEROL SULFATE 90 UG/1
2 AEROSOL, METERED RESPIRATORY (INHALATION)
Qty: 3 INHALER | Refills: 3 | Status: SHIPPED | OUTPATIENT
Start: 2017-01-01

## 2017-01-01 RX ORDER — GUAIFENESIN 100 MG/5ML
81 LIQUID (ML) ORAL DAILY
Qty: 1 TAB | Refills: 0 | Status: SHIPPED
Start: 2017-01-01

## 2017-01-01 RX ORDER — DOXYCYCLINE HYCLATE 100 MG
100 TABLET ORAL 2 TIMES DAILY
Qty: 14 TAB | Refills: 0 | Status: SHIPPED | OUTPATIENT
Start: 2017-01-01 | End: 2017-01-01

## 2017-01-01 RX ORDER — PREDNISONE 20 MG/1
60 TABLET ORAL DAILY
Qty: 12 TAB | Refills: 0 | Status: SHIPPED | OUTPATIENT
Start: 2017-01-01 | End: 2017-01-01

## 2017-01-01 RX ORDER — SODIUM CHLORIDE 0.9 % (FLUSH) 0.9 %
10 SYRINGE (ML) INJECTION
Status: COMPLETED | OUTPATIENT
Start: 2017-01-01 | End: 2017-01-01

## 2017-01-01 RX ORDER — ALBUTEROL SULFATE 0.83 MG/ML
SOLUTION RESPIRATORY (INHALATION)
Qty: 90 PACKAGE | Refills: 0 | Status: SHIPPED | OUTPATIENT
Start: 2017-01-01

## 2017-01-01 RX ORDER — ONDANSETRON 2 MG/ML
4 INJECTION INTRAMUSCULAR; INTRAVENOUS
Status: DISCONTINUED | OUTPATIENT
Start: 2017-01-01 | End: 2017-01-01 | Stop reason: HOSPADM

## 2017-01-01 RX ORDER — PREDNISONE 5 MG/1
TABLET ORAL
COMMUNITY

## 2017-01-01 RX ORDER — GUAIFENESIN 100 MG/5ML
81 LIQUID (ML) ORAL DAILY
Status: DISCONTINUED | OUTPATIENT
Start: 2017-01-01 | End: 2017-01-01 | Stop reason: HOSPADM

## 2017-01-01 RX ORDER — THERA TABS 400 MCG
1 TAB ORAL DAILY
Qty: 1 TAB | Refills: 0 | Status: SHIPPED
Start: 2017-01-01

## 2017-01-01 RX ORDER — THERA TABS 400 MCG
1 TAB ORAL DAILY
Status: DISCONTINUED | OUTPATIENT
Start: 2017-01-01 | End: 2017-01-01 | Stop reason: HOSPADM

## 2017-01-01 RX ORDER — FOLIC ACID 1 MG/1
1 TABLET ORAL DAILY
Status: DISCONTINUED | OUTPATIENT
Start: 2017-01-01 | End: 2017-01-01 | Stop reason: HOSPADM

## 2017-01-01 RX ORDER — ALBUTEROL SULFATE 0.83 MG/ML
5 SOLUTION RESPIRATORY (INHALATION)
Status: DISCONTINUED | OUTPATIENT
Start: 2017-01-01 | End: 2017-01-01

## 2017-01-01 RX ORDER — PREDNISONE 10 MG/1
TABLET ORAL
Qty: 21 TAB | Refills: 0 | Status: SHIPPED
Start: 2017-01-01

## 2017-01-01 RX ORDER — ALBUTEROL SULFATE 0.83 MG/ML
SOLUTION RESPIRATORY (INHALATION)
Qty: 3 PACKAGE | Refills: 2 | Status: SHIPPED | OUTPATIENT
Start: 2017-01-01 | End: 2017-01-01 | Stop reason: SDUPTHER

## 2017-01-01 RX ORDER — ACETAMINOPHEN 325 MG/1
650 TABLET ORAL
Status: DISCONTINUED | OUTPATIENT
Start: 2017-01-01 | End: 2017-01-01

## 2017-01-01 RX ORDER — SODIUM CHLORIDE, SODIUM LACTATE, POTASSIUM CHLORIDE, CALCIUM CHLORIDE 600; 310; 30; 20 MG/100ML; MG/100ML; MG/100ML; MG/100ML
1000 INJECTION, SOLUTION INTRAVENOUS CONTINUOUS
Status: DISCONTINUED | OUTPATIENT
Start: 2017-01-01 | End: 2017-01-01

## 2017-01-01 RX ORDER — ASPIRIN 325 MG
81 TABLET ORAL DAILY
COMMUNITY
End: 2017-01-01

## 2017-01-01 RX ADMIN — METHYLPREDNISOLONE SODIUM SUCCINATE 40 MG: 40 INJECTION, POWDER, FOR SOLUTION INTRAMUSCULAR; INTRAVENOUS at 00:38

## 2017-01-01 RX ADMIN — METHYLPREDNISOLONE SODIUM SUCCINATE 40 MG: 40 INJECTION, POWDER, FOR SOLUTION INTRAMUSCULAR; INTRAVENOUS at 06:21

## 2017-01-01 RX ADMIN — NYSTATIN 5 ML: 100000 SUSPENSION ORAL at 13:34

## 2017-01-01 RX ADMIN — GABAPENTIN 200 MG: 100 CAPSULE ORAL at 08:51

## 2017-01-01 RX ADMIN — UMECLIDINIUM 1 PUFF: 62.5 AEROSOL, POWDER ORAL at 09:16

## 2017-01-01 RX ADMIN — ACETAMINOPHEN 650 MG: 325 TABLET ORAL at 05:37

## 2017-01-01 RX ADMIN — ENOXAPARIN SODIUM 40 MG: 40 INJECTION SUBCUTANEOUS at 18:18

## 2017-01-01 RX ADMIN — METHYLPREDNISOLONE SODIUM SUCCINATE 40 MG: 40 INJECTION, POWDER, FOR SOLUTION INTRAMUSCULAR; INTRAVENOUS at 13:14

## 2017-01-01 RX ADMIN — IPRATROPIUM BROMIDE AND ALBUTEROL SULFATE 3 ML: .5; 3 SOLUTION RESPIRATORY (INHALATION) at 03:42

## 2017-01-01 RX ADMIN — NYSTATIN 5 ML: 100000 SUSPENSION ORAL at 18:00

## 2017-01-01 RX ADMIN — BACITRACIN ZINC, NEOMYCIN SULFATE, POLYMYXIN B SULFATE 1 PACKET: 3.5; 5000; 4 OINTMENT TOPICAL at 21:07

## 2017-01-01 RX ADMIN — Medication 100 MG: at 11:56

## 2017-01-01 RX ADMIN — IPRATROPIUM BROMIDE: 0.5 SOLUTION RESPIRATORY (INHALATION) at 20:17

## 2017-01-01 RX ADMIN — IPRATROPIUM BROMIDE AND ALBUTEROL SULFATE 3 ML: .5; 3 SOLUTION RESPIRATORY (INHALATION) at 17:51

## 2017-01-01 RX ADMIN — BUDESONIDE 500 MCG: 0.5 SUSPENSION RESPIRATORY (INHALATION) at 20:12

## 2017-01-01 RX ADMIN — LISINOPRIL 10 MG: 5 TABLET ORAL at 10:05

## 2017-01-01 RX ADMIN — Medication 10 ML: at 05:42

## 2017-01-01 RX ADMIN — METHYLPREDNISOLONE SODIUM SUCCINATE 40 MG: 40 INJECTION, POWDER, FOR SOLUTION INTRAMUSCULAR; INTRAVENOUS at 12:04

## 2017-01-01 RX ADMIN — ACETAMINOPHEN 650 MG: 325 TABLET ORAL at 15:07

## 2017-01-01 RX ADMIN — METHYLPREDNISOLONE SODIUM SUCCINATE 40 MG: 40 INJECTION, POWDER, FOR SOLUTION INTRAMUSCULAR; INTRAVENOUS at 13:04

## 2017-01-01 RX ADMIN — NYSTATIN 5 ML: 100000 SUSPENSION ORAL at 18:05

## 2017-01-01 RX ADMIN — SODIUM CHLORIDE 75 ML/HR: 900 INJECTION, SOLUTION INTRAVENOUS at 11:40

## 2017-01-01 RX ADMIN — METHYLPREDNISOLONE SODIUM SUCCINATE 40 MG: 40 INJECTION, POWDER, FOR SOLUTION INTRAMUSCULAR; INTRAVENOUS at 05:56

## 2017-01-01 RX ADMIN — NYSTATIN 5 ML: 100000 SUSPENSION ORAL at 21:23

## 2017-01-01 RX ADMIN — NYSTATIN 5 ML: 100000 SUSPENSION ORAL at 21:34

## 2017-01-01 RX ADMIN — FLUTICASONE FUROATE AND VILANTEROL TRIFENATATE 1 PUFF: 100; 25 POWDER RESPIRATORY (INHALATION) at 08:13

## 2017-01-01 RX ADMIN — SODIUM CHLORIDE 2313 ML: 900 INJECTION, SOLUTION INTRAVENOUS at 15:31

## 2017-01-01 RX ADMIN — NYSTATIN 5 ML: 100000 SUSPENSION ORAL at 18:33

## 2017-01-01 RX ADMIN — ASPIRIN 81 MG 81 MG: 81 TABLET ORAL at 09:49

## 2017-01-01 RX ADMIN — IPRATROPIUM BROMIDE AND ALBUTEROL SULFATE 3 ML: .5; 3 SOLUTION RESPIRATORY (INHALATION) at 14:35

## 2017-01-01 RX ADMIN — LISINOPRIL 10 MG: 5 TABLET ORAL at 08:49

## 2017-01-01 RX ADMIN — ACETAMINOPHEN 650 MG: 325 TABLET ORAL at 15:13

## 2017-01-01 RX ADMIN — IPRATROPIUM BROMIDE AND ALBUTEROL SULFATE 3 ML: .5; 3 SOLUTION RESPIRATORY (INHALATION) at 19:10

## 2017-01-01 RX ADMIN — FOLIC ACID 1 MG: 1 TABLET ORAL at 08:49

## 2017-01-01 RX ADMIN — NYSTATIN 5 ML: 100000 SUSPENSION ORAL at 18:10

## 2017-01-01 RX ADMIN — THERA TABS 1 TABLET: TAB at 09:56

## 2017-01-01 RX ADMIN — GABAPENTIN 200 MG: 100 CAPSULE ORAL at 17:38

## 2017-01-01 RX ADMIN — IOPAMIDOL 100 ML: 755 INJECTION, SOLUTION INTRAVENOUS at 18:54

## 2017-01-01 RX ADMIN — GABAPENTIN 200 MG: 100 CAPSULE ORAL at 11:42

## 2017-01-01 RX ADMIN — THERA TABS 1 TABLET: TAB at 09:42

## 2017-01-01 RX ADMIN — Medication 10 ML: at 21:25

## 2017-01-01 RX ADMIN — UMECLIDINIUM 1 PUFF: 62.5 AEROSOL, POWDER ORAL at 08:58

## 2017-01-01 RX ADMIN — Medication 100 MG: at 09:43

## 2017-01-01 RX ADMIN — GABAPENTIN 200 MG: 100 CAPSULE ORAL at 18:05

## 2017-01-01 RX ADMIN — ACETAMINOPHEN 650 MG: 325 TABLET ORAL at 07:51

## 2017-01-01 RX ADMIN — Medication 10 ML: at 15:13

## 2017-01-01 RX ADMIN — NYSTATIN 5 ML: 100000 SUSPENSION ORAL at 09:50

## 2017-01-01 RX ADMIN — FLUTICASONE FUROATE AND VILANTEROL TRIFENATATE 1 PUFF: 100; 25 POWDER RESPIRATORY (INHALATION) at 09:16

## 2017-01-01 RX ADMIN — NYSTATIN 5 ML: 100000 SUSPENSION ORAL at 15:07

## 2017-01-01 RX ADMIN — THERA TABS 1 TABLET: TAB at 08:49

## 2017-01-01 RX ADMIN — ACETAMINOPHEN 650 MG: 325 TABLET ORAL at 12:07

## 2017-01-01 RX ADMIN — NYSTATIN 5 ML: 100000 SUSPENSION ORAL at 23:32

## 2017-01-01 RX ADMIN — ENOXAPARIN SODIUM 40 MG: 40 INJECTION SUBCUTANEOUS at 17:38

## 2017-01-01 RX ADMIN — ACETAMINOPHEN 650 MG: 325 TABLET ORAL at 06:26

## 2017-01-01 RX ADMIN — THERA TABS 1 TABLET: TAB at 09:23

## 2017-01-01 RX ADMIN — IPRATROPIUM BROMIDE AND ALBUTEROL SULFATE 3 ML: .5; 3 SOLUTION RESPIRATORY (INHALATION) at 15:26

## 2017-01-01 RX ADMIN — METHYLPREDNISOLONE SODIUM SUCCINATE 40 MG: 40 INJECTION, POWDER, FOR SOLUTION INTRAMUSCULAR; INTRAVENOUS at 13:33

## 2017-01-01 RX ADMIN — Medication 10 ML: at 06:55

## 2017-01-01 RX ADMIN — FOLIC ACID 1 MG: 1 TABLET ORAL at 09:23

## 2017-01-01 RX ADMIN — Medication 10 ML: at 00:11

## 2017-01-01 RX ADMIN — IPRATROPIUM BROMIDE 1 DOSE: 0.5 SOLUTION RESPIRATORY (INHALATION) at 07:30

## 2017-01-01 RX ADMIN — ACETAMINOPHEN 650 MG: 325 TABLET ORAL at 06:27

## 2017-01-01 RX ADMIN — BUDESONIDE 500 MCG: 0.5 SUSPENSION RESPIRATORY (INHALATION) at 20:40

## 2017-01-01 RX ADMIN — GABAPENTIN 200 MG: 100 CAPSULE ORAL at 18:07

## 2017-01-01 RX ADMIN — Medication 10 ML: at 21:19

## 2017-01-01 RX ADMIN — SODIUM CHLORIDE 75 ML/HR: 900 INJECTION, SOLUTION INTRAVENOUS at 09:54

## 2017-01-01 RX ADMIN — SODIUM CHLORIDE 75 ML/HR: 900 INJECTION, SOLUTION INTRAVENOUS at 19:29

## 2017-01-01 RX ADMIN — METHYLPREDNISOLONE SODIUM SUCCINATE 40 MG: 40 INJECTION, POWDER, FOR SOLUTION INTRAMUSCULAR; INTRAVENOUS at 23:35

## 2017-01-01 RX ADMIN — FOLIC ACID 1 MG: 1 TABLET ORAL at 08:08

## 2017-01-01 RX ADMIN — BUDESONIDE 500 MCG: 0.5 SUSPENSION RESPIRATORY (INHALATION) at 07:59

## 2017-01-01 RX ADMIN — METHYLPREDNISOLONE SODIUM SUCCINATE 40 MG: 40 INJECTION, POWDER, FOR SOLUTION INTRAMUSCULAR; INTRAVENOUS at 00:01

## 2017-01-01 RX ADMIN — METHYLPREDNISOLONE SODIUM SUCCINATE 40 MG: 40 INJECTION, POWDER, FOR SOLUTION INTRAMUSCULAR; INTRAVENOUS at 18:24

## 2017-01-01 RX ADMIN — IPRATROPIUM BROMIDE AND ALBUTEROL SULFATE 3 ML: .5; 3 SOLUTION RESPIRATORY (INHALATION) at 20:12

## 2017-01-01 RX ADMIN — Medication 10 ML: at 21:42

## 2017-01-01 RX ADMIN — Medication 10 ML: at 05:45

## 2017-01-01 RX ADMIN — GABAPENTIN 200 MG: 100 CAPSULE ORAL at 17:59

## 2017-01-01 RX ADMIN — IPRATROPIUM BROMIDE 1 DOSE: 0.5 SOLUTION RESPIRATORY (INHALATION) at 07:47

## 2017-01-01 RX ADMIN — METHYLPREDNISOLONE SODIUM SUCCINATE 40 MG: 40 INJECTION, POWDER, FOR SOLUTION INTRAMUSCULAR; INTRAVENOUS at 23:32

## 2017-01-01 RX ADMIN — Medication 10 ML: at 21:17

## 2017-01-01 RX ADMIN — DOXYCYCLINE HYCLATE 100 MG: 100 TABLET, COATED ORAL at 21:07

## 2017-01-01 RX ADMIN — BUDESONIDE 500 MCG: 0.5 SUSPENSION RESPIRATORY (INHALATION) at 19:10

## 2017-01-01 RX ADMIN — GUAIFENESIN 600 MG: 600 TABLET, EXTENDED RELEASE ORAL at 00:10

## 2017-01-01 RX ADMIN — ASPIRIN 81 MG 81 MG: 81 TABLET ORAL at 08:08

## 2017-01-01 RX ADMIN — METHYLPREDNISOLONE SODIUM SUCCINATE 40 MG: 40 INJECTION, POWDER, FOR SOLUTION INTRAMUSCULAR; INTRAVENOUS at 05:29

## 2017-01-01 RX ADMIN — METHYLPREDNISOLONE SODIUM SUCCINATE 40 MG: 40 INJECTION, POWDER, FOR SOLUTION INTRAMUSCULAR; INTRAVENOUS at 17:38

## 2017-01-01 RX ADMIN — FOLIC ACID 1 MG: 1 TABLET ORAL at 11:42

## 2017-01-01 RX ADMIN — FOLIC ACID 1 MG: 1 TABLET ORAL at 10:13

## 2017-01-01 RX ADMIN — Medication 10 ML: at 13:15

## 2017-01-01 RX ADMIN — GABAPENTIN 200 MG: 100 CAPSULE ORAL at 18:19

## 2017-01-01 RX ADMIN — METHYLPREDNISOLONE SODIUM SUCCINATE 40 MG: 40 INJECTION, POWDER, FOR SOLUTION INTRAMUSCULAR; INTRAVENOUS at 18:00

## 2017-01-01 RX ADMIN — NYSTATIN 5 ML: 100000 SUSPENSION ORAL at 08:10

## 2017-01-01 RX ADMIN — GABAPENTIN 200 MG: 100 CAPSULE ORAL at 09:22

## 2017-01-01 RX ADMIN — ACETAMINOPHEN 650 MG: 325 TABLET ORAL at 05:56

## 2017-01-01 RX ADMIN — THERA TABS 1 TABLET: TAB at 09:50

## 2017-01-01 RX ADMIN — FOLIC ACID 1 MG: 1 TABLET ORAL at 09:50

## 2017-01-01 RX ADMIN — NYSTATIN 5 ML: 100000 SUSPENSION ORAL at 21:14

## 2017-01-01 RX ADMIN — METHYLPREDNISOLONE SODIUM SUCCINATE 40 MG: 40 INJECTION, POWDER, FOR SOLUTION INTRAMUSCULAR; INTRAVENOUS at 18:58

## 2017-01-01 RX ADMIN — GABAPENTIN 200 MG: 100 CAPSULE ORAL at 18:24

## 2017-01-01 RX ADMIN — GADOBUTROL 10 ML: 604.72 INJECTION INTRAVENOUS at 08:22

## 2017-01-01 RX ADMIN — Medication 10 ML: at 15:06

## 2017-01-01 RX ADMIN — Medication 10 ML: at 21:22

## 2017-01-01 RX ADMIN — IPRATROPIUM BROMIDE AND ALBUTEROL SULFATE 3 ML: .5; 3 SOLUTION RESPIRATORY (INHALATION) at 16:28

## 2017-01-01 RX ADMIN — LISINOPRIL 10 MG: 5 TABLET ORAL at 08:52

## 2017-01-01 RX ADMIN — FLUTICASONE FUROATE AND VILANTEROL TRIFENATATE 1 PUFF: 100; 25 POWDER RESPIRATORY (INHALATION) at 10:48

## 2017-01-01 RX ADMIN — LISINOPRIL 10 MG: 5 TABLET ORAL at 10:12

## 2017-01-01 RX ADMIN — SODIUM CHLORIDE 1000 ML: 900 INJECTION, SOLUTION INTRAVENOUS at 16:28

## 2017-01-01 RX ADMIN — SODIUM CHLORIDE 75 ML/HR: 900 INJECTION, SOLUTION INTRAVENOUS at 23:38

## 2017-01-01 RX ADMIN — IPRATROPIUM BROMIDE AND ALBUTEROL SULFATE 3 ML: .5; 3 SOLUTION RESPIRATORY (INHALATION) at 07:59

## 2017-01-01 RX ADMIN — Medication 10 ML: at 06:27

## 2017-01-01 RX ADMIN — BUDESONIDE 500 MCG: 0.5 SUSPENSION RESPIRATORY (INHALATION) at 07:45

## 2017-01-01 RX ADMIN — IOPAMIDOL 100 ML: 755 INJECTION, SOLUTION INTRAVENOUS at 16:42

## 2017-01-01 RX ADMIN — LISINOPRIL 10 MG: 5 TABLET ORAL at 09:49

## 2017-01-01 RX ADMIN — GABAPENTIN 200 MG: 100 CAPSULE ORAL at 08:49

## 2017-01-01 RX ADMIN — GABAPENTIN 200 MG: 100 CAPSULE ORAL at 10:13

## 2017-01-01 RX ADMIN — SODIUM CHLORIDE 75 ML/HR: 900 INJECTION, SOLUTION INTRAVENOUS at 22:08

## 2017-01-01 RX ADMIN — Medication 10 ML: at 05:56

## 2017-01-01 RX ADMIN — THERA TABS 1 TABLET: TAB at 08:52

## 2017-01-01 RX ADMIN — ASPIRIN 81 MG 81 MG: 81 TABLET ORAL at 08:50

## 2017-01-01 RX ADMIN — METHYLPREDNISOLONE SODIUM SUCCINATE 40 MG: 40 INJECTION, POWDER, FOR SOLUTION INTRAMUSCULAR; INTRAVENOUS at 18:07

## 2017-01-01 RX ADMIN — GABAPENTIN 200 MG: 100 CAPSULE ORAL at 09:44

## 2017-01-01 RX ADMIN — METHYLPREDNISOLONE SODIUM SUCCINATE 40 MG: 40 INJECTION, POWDER, FOR SOLUTION INTRAMUSCULAR; INTRAVENOUS at 14:00

## 2017-01-01 RX ADMIN — Medication 10 ML: at 05:29

## 2017-01-01 RX ADMIN — NYSTATIN 5 ML: 100000 SUSPENSION ORAL at 10:00

## 2017-01-01 RX ADMIN — GABAPENTIN 200 MG: 100 CAPSULE ORAL at 18:58

## 2017-01-01 RX ADMIN — Medication 10 ML: at 05:57

## 2017-01-01 RX ADMIN — Medication 10 ML: at 15:09

## 2017-01-01 RX ADMIN — ASPIRIN 81 MG 81 MG: 81 TABLET ORAL at 11:42

## 2017-01-01 RX ADMIN — SODIUM CHLORIDE 50 ML/HR: 900 INJECTION, SOLUTION INTRAVENOUS at 18:54

## 2017-01-01 RX ADMIN — GABAPENTIN 200 MG: 100 CAPSULE ORAL at 09:50

## 2017-01-01 RX ADMIN — IPRATROPIUM BROMIDE AND ALBUTEROL SULFATE 3 ML: .5; 3 SOLUTION RESPIRATORY (INHALATION) at 16:14

## 2017-01-01 RX ADMIN — ACETAMINOPHEN 650 MG: 325 TABLET ORAL at 16:45

## 2017-01-01 RX ADMIN — GUAIFENESIN 600 MG: 600 TABLET, EXTENDED RELEASE ORAL at 08:55

## 2017-01-01 RX ADMIN — NYSTATIN 5 ML: 100000 SUSPENSION ORAL at 21:26

## 2017-01-01 RX ADMIN — METHYLPREDNISOLONE SODIUM SUCCINATE 40 MG: 40 INJECTION, POWDER, FOR SOLUTION INTRAMUSCULAR; INTRAVENOUS at 06:52

## 2017-01-01 RX ADMIN — METHYLPREDNISOLONE SODIUM SUCCINATE 40 MG: 40 INJECTION, POWDER, FOR SOLUTION INTRAMUSCULAR; INTRAVENOUS at 12:06

## 2017-01-01 RX ADMIN — ACETAMINOPHEN 650 MG: 325 TABLET ORAL at 22:09

## 2017-01-01 RX ADMIN — ACETAMINOPHEN 650 MG: 325 TABLET ORAL at 11:42

## 2017-01-01 RX ADMIN — GUAIFENESIN 600 MG: 600 TABLET, EXTENDED RELEASE ORAL at 21:22

## 2017-01-01 RX ADMIN — ACETAMINOPHEN 650 MG: 325 TABLET ORAL at 19:52

## 2017-01-01 RX ADMIN — GUAIFENESIN 600 MG: 600 TABLET, EXTENDED RELEASE ORAL at 21:14

## 2017-01-01 RX ADMIN — ACETAMINOPHEN 650 MG: 325 TABLET ORAL at 19:43

## 2017-01-01 RX ADMIN — IPRATROPIUM BROMIDE 1 DOSE: 0.5 SOLUTION RESPIRATORY (INHALATION) at 02:00

## 2017-01-01 RX ADMIN — IPRATROPIUM BROMIDE: 0.5 SOLUTION RESPIRATORY (INHALATION) at 01:29

## 2017-01-01 RX ADMIN — GUAIFENESIN 600 MG: 600 TABLET, EXTENDED RELEASE ORAL at 11:42

## 2017-01-01 RX ADMIN — BUDESONIDE 500 MCG: 0.5 SUSPENSION RESPIRATORY (INHALATION) at 19:41

## 2017-01-01 RX ADMIN — Medication 100 MG: at 08:49

## 2017-01-01 RX ADMIN — UMECLIDINIUM 1 PUFF: 62.5 AEROSOL, POWDER ORAL at 09:49

## 2017-01-01 RX ADMIN — NYSTATIN 5 ML: 100000 SUSPENSION ORAL at 00:11

## 2017-01-01 RX ADMIN — ENOXAPARIN SODIUM 40 MG: 40 INJECTION SUBCUTANEOUS at 18:07

## 2017-01-01 RX ADMIN — NYSTATIN 5 ML: 100000 SUSPENSION ORAL at 23:45

## 2017-01-01 RX ADMIN — METHYLPREDNISOLONE SODIUM SUCCINATE 40 MG: 40 INJECTION, POWDER, FOR SOLUTION INTRAMUSCULAR; INTRAVENOUS at 23:07

## 2017-01-01 RX ADMIN — GUAIFENESIN 600 MG: 600 TABLET, EXTENDED RELEASE ORAL at 08:08

## 2017-01-01 RX ADMIN — GUAIFENESIN 600 MG: 600 TABLET, EXTENDED RELEASE ORAL at 10:13

## 2017-01-01 RX ADMIN — Medication 10 ML: at 22:10

## 2017-01-01 RX ADMIN — IPRATROPIUM BROMIDE AND ALBUTEROL SULFATE 3 ML: .5; 3 SOLUTION RESPIRATORY (INHALATION) at 11:29

## 2017-01-01 RX ADMIN — ACETAMINOPHEN 650 MG: 325 TABLET ORAL at 14:44

## 2017-01-01 RX ADMIN — Medication 10 ML: at 21:33

## 2017-01-01 RX ADMIN — IPRATROPIUM BROMIDE AND ALBUTEROL SULFATE 3 ML: .5; 3 SOLUTION RESPIRATORY (INHALATION) at 07:56

## 2017-01-01 RX ADMIN — Medication 100 MG: at 09:22

## 2017-01-01 RX ADMIN — Medication 100 MG: at 11:42

## 2017-01-01 RX ADMIN — IPRATROPIUM BROMIDE: 0.5 SOLUTION RESPIRATORY (INHALATION) at 19:38

## 2017-01-01 RX ADMIN — IPRATROPIUM BROMIDE AND ALBUTEROL SULFATE 3 ML: .5; 3 SOLUTION RESPIRATORY (INHALATION) at 19:22

## 2017-01-01 RX ADMIN — NYSTATIN 5 ML: 100000 SUSPENSION ORAL at 21:17

## 2017-01-01 RX ADMIN — GUAIFENESIN 600 MG: 600 TABLET, EXTENDED RELEASE ORAL at 22:09

## 2017-01-01 RX ADMIN — GUAIFENESIN 600 MG: 600 TABLET, EXTENDED RELEASE ORAL at 21:19

## 2017-01-01 RX ADMIN — METHYLPREDNISOLONE SODIUM SUCCINATE 40 MG: 40 INJECTION, POWDER, FOR SOLUTION INTRAMUSCULAR; INTRAVENOUS at 00:11

## 2017-01-01 RX ADMIN — Medication 100 MG: at 10:05

## 2017-01-01 RX ADMIN — LISINOPRIL 10 MG: 5 TABLET ORAL at 11:56

## 2017-01-01 RX ADMIN — IPRATROPIUM BROMIDE AND ALBUTEROL SULFATE 3 ML: .5; 3 SOLUTION RESPIRATORY (INHALATION) at 08:12

## 2017-01-01 RX ADMIN — NYSTATIN 5 ML: 100000 SUSPENSION ORAL at 18:59

## 2017-01-01 RX ADMIN — GUAIFENESIN 600 MG: 600 TABLET, EXTENDED RELEASE ORAL at 13:10

## 2017-01-01 RX ADMIN — NYSTATIN 5 ML: 100000 SUSPENSION ORAL at 18:44

## 2017-01-01 RX ADMIN — NYSTATIN 5 ML: 100000 SUSPENSION ORAL at 10:10

## 2017-01-01 RX ADMIN — GUAIFENESIN 600 MG: 600 TABLET, EXTENDED RELEASE ORAL at 21:41

## 2017-01-01 RX ADMIN — ENOXAPARIN SODIUM 40 MG: 40 INJECTION SUBCUTANEOUS at 18:26

## 2017-01-01 RX ADMIN — IPRATROPIUM BROMIDE AND ALBUTEROL SULFATE 3 ML: .5; 3 SOLUTION RESPIRATORY (INHALATION) at 11:40

## 2017-01-01 RX ADMIN — NYSTATIN 5 ML: 100000 SUSPENSION ORAL at 08:51

## 2017-01-01 RX ADMIN — IPRATROPIUM BROMIDE AND ALBUTEROL SULFATE 3 ML: .5; 3 SOLUTION RESPIRATORY (INHALATION) at 08:06

## 2017-01-01 RX ADMIN — METHYLPREDNISOLONE SODIUM SUCCINATE 40 MG: 40 INJECTION, POWDER, FOR SOLUTION INTRAMUSCULAR; INTRAVENOUS at 05:42

## 2017-01-01 RX ADMIN — UMECLIDINIUM 1 PUFF: 62.5 AEROSOL, POWDER ORAL at 10:09

## 2017-01-01 RX ADMIN — ACETAMINOPHEN 650 MG: 325 TABLET ORAL at 15:08

## 2017-01-01 RX ADMIN — SODIUM CHLORIDE 50 ML/HR: 900 INJECTION, SOLUTION INTRAVENOUS at 16:43

## 2017-01-01 RX ADMIN — SODIUM CHLORIDE 75 ML/HR: 900 INJECTION, SOLUTION INTRAVENOUS at 17:38

## 2017-01-01 RX ADMIN — THERA TABS 1 TABLET: TAB at 08:08

## 2017-01-01 RX ADMIN — GUAIFENESIN 600 MG: 600 TABLET, EXTENDED RELEASE ORAL at 09:56

## 2017-01-01 RX ADMIN — UMECLIDINIUM 1 PUFF: 62.5 AEROSOL, POWDER ORAL at 09:00

## 2017-01-01 RX ADMIN — SODIUM CHLORIDE, PRESERVATIVE FREE 500 UNITS: 5 INJECTION INTRAVENOUS at 17:04

## 2017-01-01 RX ADMIN — ALBUTEROL SULFATE 2.5 MG: 2.5 SOLUTION RESPIRATORY (INHALATION) at 21:27

## 2017-01-01 RX ADMIN — GABAPENTIN 200 MG: 100 CAPSULE ORAL at 10:05

## 2017-01-01 RX ADMIN — METHYLPREDNISOLONE SODIUM SUCCINATE 40 MG: 40 INJECTION, POWDER, FOR SOLUTION INTRAMUSCULAR; INTRAVENOUS at 05:57

## 2017-01-01 RX ADMIN — IPRATROPIUM BROMIDE AND ALBUTEROL SULFATE 3 ML: .5; 3 SOLUTION RESPIRATORY (INHALATION) at 11:38

## 2017-01-01 RX ADMIN — NYSTATIN 5 ML: 100000 SUSPENSION ORAL at 10:15

## 2017-01-01 RX ADMIN — GUAIFENESIN 600 MG: 600 TABLET, EXTENDED RELEASE ORAL at 21:33

## 2017-01-01 RX ADMIN — METHYLPREDNISOLONE SODIUM SUCCINATE 40 MG: 40 INJECTION, POWDER, FOR SOLUTION INTRAMUSCULAR; INTRAVENOUS at 18:26

## 2017-01-01 RX ADMIN — Medication 10 ML: at 18:09

## 2017-01-01 RX ADMIN — IPRATROPIUM BROMIDE AND ALBUTEROL SULFATE 3 ML: .5; 3 SOLUTION RESPIRATORY (INHALATION) at 21:35

## 2017-01-01 RX ADMIN — Medication 10 ML: at 16:43

## 2017-01-01 RX ADMIN — PREDNISONE 40 MG: 10 TABLET ORAL at 21:07

## 2017-01-01 RX ADMIN — NYSTATIN 5 ML: 100000 SUSPENSION ORAL at 11:58

## 2017-01-01 RX ADMIN — NYSTATIN 5 ML: 100000 SUSPENSION ORAL at 22:10

## 2017-01-01 RX ADMIN — THERA TABS 1 TABLET: TAB at 10:05

## 2017-01-01 RX ADMIN — IPRATROPIUM BROMIDE AND ALBUTEROL SULFATE 3 ML: .5; 3 SOLUTION RESPIRATORY (INHALATION) at 23:32

## 2017-01-01 RX ADMIN — FLUTICASONE FUROATE AND VILANTEROL TRIFENATATE 1 PUFF: 100; 25 POWDER RESPIRATORY (INHALATION) at 08:58

## 2017-01-01 RX ADMIN — GABAPENTIN 200 MG: 100 CAPSULE ORAL at 11:55

## 2017-01-01 RX ADMIN — ASPIRIN 81 MG 81 MG: 81 TABLET ORAL at 10:13

## 2017-01-01 RX ADMIN — ALBUTEROL SULFATE 2.5 MG: 2.5 SOLUTION RESPIRATORY (INHALATION) at 00:15

## 2017-01-01 RX ADMIN — METHYLPREDNISOLONE SODIUM SUCCINATE 40 MG: 40 INJECTION, POWDER, FOR SOLUTION INTRAMUSCULAR; INTRAVENOUS at 00:05

## 2017-01-01 RX ADMIN — GUAIFENESIN 600 MG: 600 TABLET, EXTENDED RELEASE ORAL at 10:05

## 2017-01-01 RX ADMIN — IPRATROPIUM BROMIDE 1 DOSE: 0.5 SOLUTION RESPIRATORY (INHALATION) at 02:39

## 2017-01-01 RX ADMIN — THERA TABS 1 TABLET: TAB at 10:14

## 2017-01-01 RX ADMIN — ASPIRIN 81 MG 81 MG: 81 TABLET ORAL at 08:57

## 2017-01-01 RX ADMIN — BUDESONIDE 500 MCG: 0.5 SUSPENSION RESPIRATORY (INHALATION) at 07:58

## 2017-01-01 RX ADMIN — METHYLPREDNISOLONE SODIUM SUCCINATE 40 MG: 40 INJECTION, POWDER, FOR SOLUTION INTRAMUSCULAR; INTRAVENOUS at 18:38

## 2017-01-01 RX ADMIN — GABAPENTIN 200 MG: 100 CAPSULE ORAL at 09:56

## 2017-01-01 RX ADMIN — Medication 10 ML: at 14:00

## 2017-01-01 RX ADMIN — BUDESONIDE 500 MCG: 0.5 SUSPENSION RESPIRATORY (INHALATION) at 07:47

## 2017-01-01 RX ADMIN — FLUTICASONE FUROATE AND VILANTEROL TRIFENATATE 1 PUFF: 100; 25 POWDER RESPIRATORY (INHALATION) at 11:43

## 2017-01-01 RX ADMIN — UMECLIDINIUM 1 PUFF: 62.5 AEROSOL, POWDER ORAL at 08:13

## 2017-01-01 RX ADMIN — ASPIRIN 81 MG 81 MG: 81 TABLET ORAL at 11:56

## 2017-01-01 RX ADMIN — IPRATROPIUM BROMIDE AND ALBUTEROL SULFATE 3 ML: .5; 3 SOLUTION RESPIRATORY (INHALATION) at 14:43

## 2017-01-01 RX ADMIN — Medication 10 ML: at 06:21

## 2017-01-01 RX ADMIN — NYSTATIN 5 ML: 100000 SUSPENSION ORAL at 21:43

## 2017-01-01 RX ADMIN — SODIUM CHLORIDE 75 ML/HR: 900 INJECTION, SOLUTION INTRAVENOUS at 12:25

## 2017-01-01 RX ADMIN — ASPIRIN 81 MG 81 MG: 81 TABLET ORAL at 09:44

## 2017-01-01 RX ADMIN — GABAPENTIN 200 MG: 100 CAPSULE ORAL at 18:52

## 2017-01-01 RX ADMIN — METHYLPREDNISOLONE SODIUM SUCCINATE 40 MG: 40 INJECTION, POWDER, FOR SOLUTION INTRAMUSCULAR; INTRAVENOUS at 23:55

## 2017-01-01 RX ADMIN — Medication 10 ML: at 23:45

## 2017-01-01 RX ADMIN — ALBUTEROL SULFATE 2.5 MG: 2.5 SOLUTION RESPIRATORY (INHALATION) at 03:06

## 2017-01-01 RX ADMIN — GUAIFENESIN 600 MG: 600 TABLET, EXTENDED RELEASE ORAL at 09:22

## 2017-01-01 RX ADMIN — UMECLIDINIUM 1 PUFF: 62.5 AEROSOL, POWDER ORAL at 11:43

## 2017-01-01 RX ADMIN — BUDESONIDE 500 MCG: 0.5 SUSPENSION RESPIRATORY (INHALATION) at 07:29

## 2017-01-01 RX ADMIN — METHYLPREDNISOLONE SODIUM SUCCINATE 40 MG: 40 INJECTION, POWDER, FOR SOLUTION INTRAMUSCULAR; INTRAVENOUS at 00:28

## 2017-01-01 RX ADMIN — FOLIC ACID 1 MG: 1 TABLET ORAL at 09:56

## 2017-01-01 RX ADMIN — ENOXAPARIN SODIUM 40 MG: 40 INJECTION SUBCUTANEOUS at 18:00

## 2017-01-01 RX ADMIN — IPRATROPIUM BROMIDE AND ALBUTEROL SULFATE 3 ML: .5; 3 SOLUTION RESPIRATORY (INHALATION) at 12:20

## 2017-01-01 RX ADMIN — GUAIFENESIN 600 MG: 600 TABLET, EXTENDED RELEASE ORAL at 23:45

## 2017-01-01 RX ADMIN — METHYLPREDNISOLONE SODIUM SUCCINATE 40 MG: 40 INJECTION, POWDER, FOR SOLUTION INTRAMUSCULAR; INTRAVENOUS at 13:13

## 2017-01-01 RX ADMIN — ALUMINUM HYDROXIDE, MAGNESIUM HYDROXIDE, AND DIMETHICONE 30 ML: 400; 400; 40 SUSPENSION ORAL at 16:42

## 2017-01-01 RX ADMIN — UMECLIDINIUM 1 PUFF: 62.5 AEROSOL, POWDER ORAL at 11:58

## 2017-01-01 RX ADMIN — Medication 100 MG: at 08:52

## 2017-01-01 RX ADMIN — Medication 10 ML: at 18:54

## 2017-01-01 RX ADMIN — FOLIC ACID 1 MG: 1 TABLET ORAL at 08:57

## 2017-01-01 RX ADMIN — ENOXAPARIN SODIUM 40 MG: 40 INJECTION SUBCUTANEOUS at 18:58

## 2017-01-01 RX ADMIN — IPRATROPIUM BROMIDE 1 DOSE: 0.5 SOLUTION RESPIRATORY (INHALATION) at 13:54

## 2017-01-01 RX ADMIN — METHYLPREDNISOLONE SODIUM SUCCINATE 40 MG: 40 INJECTION, POWDER, FOR SOLUTION INTRAMUSCULAR; INTRAVENOUS at 06:00

## 2017-01-01 RX ADMIN — METHYLPREDNISOLONE SODIUM SUCCINATE 40 MG: 40 INJECTION, POWDER, FOR SOLUTION INTRAMUSCULAR; INTRAVENOUS at 11:40

## 2017-01-01 RX ADMIN — GABAPENTIN 200 MG: 100 CAPSULE ORAL at 18:25

## 2017-01-01 RX ADMIN — Medication 10 ML: at 14:45

## 2017-01-01 RX ADMIN — IPRATROPIUM BROMIDE AND ALBUTEROL SULFATE 3 ML: .5; 3 SOLUTION RESPIRATORY (INHALATION) at 16:07

## 2017-01-01 RX ADMIN — NYSTATIN 5 ML: 100000 SUSPENSION ORAL at 09:01

## 2017-01-01 RX ADMIN — METHYLPREDNISOLONE SODIUM SUCCINATE 40 MG: 40 INJECTION, POWDER, FOR SOLUTION INTRAMUSCULAR; INTRAVENOUS at 23:45

## 2017-01-01 RX ADMIN — Medication 10 ML: at 06:01

## 2017-01-01 RX ADMIN — Medication 10 ML: at 23:07

## 2017-01-01 RX ADMIN — NYSTATIN 5 ML: 100000 SUSPENSION ORAL at 23:07

## 2017-01-01 RX ADMIN — ASPIRIN 81 MG 81 MG: 81 TABLET ORAL at 09:56

## 2017-01-01 RX ADMIN — Medication 100 MG: at 08:08

## 2017-01-01 RX ADMIN — ENOXAPARIN SODIUM 40 MG: 40 INJECTION SUBCUTANEOUS at 18:52

## 2017-01-01 RX ADMIN — METHYLPREDNISOLONE SODIUM SUCCINATE 40 MG: 40 INJECTION, POWDER, FOR SOLUTION INTRAMUSCULAR; INTRAVENOUS at 05:36

## 2017-01-01 RX ADMIN — GUAIFENESIN 600 MG: 600 TABLET, EXTENDED RELEASE ORAL at 11:56

## 2017-01-01 RX ADMIN — NYSTATIN 5 ML: 100000 SUSPENSION ORAL at 09:24

## 2017-01-01 RX ADMIN — NYSTATIN 5 ML: 100000 SUSPENSION ORAL at 13:10

## 2017-01-01 RX ADMIN — ALBUTEROL SULFATE 2.5 MG: 2.5 SOLUTION RESPIRATORY (INHALATION) at 07:45

## 2017-01-01 RX ADMIN — GUAIFENESIN 600 MG: 600 TABLET, EXTENDED RELEASE ORAL at 09:43

## 2017-01-01 RX ADMIN — NYSTATIN 5 ML: 100000 SUSPENSION ORAL at 12:18

## 2017-01-01 RX ADMIN — NYSTATIN 5 ML: 100000 SUSPENSION ORAL at 14:00

## 2017-01-01 RX ADMIN — ASPIRIN 81 MG 81 MG: 81 TABLET ORAL at 09:22

## 2017-01-01 RX ADMIN — UMECLIDINIUM 1 PUFF: 62.5 AEROSOL, POWDER ORAL at 10:03

## 2017-01-01 RX ADMIN — NYSTATIN 5 ML: 100000 SUSPENSION ORAL at 13:54

## 2017-01-01 RX ADMIN — LISINOPRIL 10 MG: 5 TABLET ORAL at 08:08

## 2017-01-01 RX ADMIN — Medication 10 ML: at 06:20

## 2017-01-01 RX ADMIN — METHYLPREDNISOLONE SODIUM SUCCINATE 125 MG: 125 INJECTION, POWDER, FOR SOLUTION INTRAMUSCULAR; INTRAVENOUS at 17:51

## 2017-01-01 RX ADMIN — ACETAMINOPHEN 650 MG: 325 TABLET ORAL at 13:19

## 2017-01-01 RX ADMIN — LISINOPRIL 10 MG: 5 TABLET ORAL at 09:51

## 2017-01-01 RX ADMIN — LIDOCAINE HYDROCHLORIDE 1 G: 10 INJECTION, SOLUTION EPIDURAL; INFILTRATION; INTRACAUDAL; PERINEURAL at 07:42

## 2017-01-01 RX ADMIN — Medication 100 MG: at 09:50

## 2017-01-01 RX ADMIN — IPRATROPIUM BROMIDE 1 DOSE: 0.5 SOLUTION RESPIRATORY (INHALATION) at 15:16

## 2017-01-01 RX ADMIN — GUAIFENESIN 600 MG: 600 TABLET, EXTENDED RELEASE ORAL at 21:25

## 2017-01-01 RX ADMIN — NYSTATIN 5 ML: 100000 SUSPENSION ORAL at 18:38

## 2017-01-01 RX ADMIN — FOLIC ACID 1 MG: 1 TABLET ORAL at 11:56

## 2017-01-01 RX ADMIN — IPRATROPIUM BROMIDE 1 DOSE: 0.5 SOLUTION RESPIRATORY (INHALATION) at 20:39

## 2017-01-01 RX ADMIN — GABAPENTIN 200 MG: 100 CAPSULE ORAL at 08:08

## 2017-01-01 RX ADMIN — Medication 10 ML: at 13:42

## 2017-01-01 RX ADMIN — LISINOPRIL 10 MG: 5 TABLET ORAL at 09:16

## 2017-01-01 RX ADMIN — Medication 10 ML: at 15:31

## 2017-01-01 RX ADMIN — ENOXAPARIN SODIUM 40 MG: 40 INJECTION SUBCUTANEOUS at 18:08

## 2017-01-01 RX ADMIN — Medication 10 ML: at 21:14

## 2017-01-01 RX ADMIN — ACETAMINOPHEN 650 MG: 325 TABLET ORAL at 00:10

## 2017-01-01 RX ADMIN — IPRATROPIUM BROMIDE 1 DOSE: 0.5 SOLUTION RESPIRATORY (INHALATION) at 07:56

## 2017-01-01 RX ADMIN — Medication 10 ML: at 05:37

## 2017-01-01 RX ADMIN — LISINOPRIL 10 MG: 5 TABLET ORAL at 09:44

## 2017-01-01 RX ADMIN — METHYLPREDNISOLONE SODIUM SUCCINATE 40 MG: 40 INJECTION, POWDER, FOR SOLUTION INTRAMUSCULAR; INTRAVENOUS at 06:26

## 2017-01-01 RX ADMIN — SODIUM CHLORIDE 75 ML/HR: 900 INJECTION, SOLUTION INTRAVENOUS at 04:39

## 2017-01-01 RX ADMIN — THERA TABS 1 TABLET: TAB at 11:42

## 2017-01-01 RX ADMIN — BUDESONIDE 500 MCG: 0.5 SUSPENSION RESPIRATORY (INHALATION) at 08:26

## 2017-01-01 RX ADMIN — ENOXAPARIN SODIUM 40 MG: 40 INJECTION SUBCUTANEOUS at 18:35

## 2017-01-01 RX ADMIN — UMECLIDINIUM 1 PUFF: 62.5 AEROSOL, POWDER ORAL at 10:47

## 2017-01-01 RX ADMIN — NYSTATIN 5 ML: 100000 SUSPENSION ORAL at 18:24

## 2017-01-01 RX ADMIN — IPRATROPIUM BROMIDE AND ALBUTEROL SULFATE 3 ML: .5; 3 SOLUTION RESPIRATORY (INHALATION) at 14:51

## 2017-01-01 RX ADMIN — UMECLIDINIUM 1 PUFF: 62.5 AEROSOL, POWDER ORAL at 08:49

## 2017-01-01 RX ADMIN — ALBUTEROL SULFATE 2.5 MG: 2.5 SOLUTION RESPIRATORY (INHALATION) at 12:14

## 2017-01-01 RX ADMIN — Medication 100 MG: at 10:13

## 2017-01-01 RX ADMIN — METHYLPREDNISOLONE SODIUM SUCCINATE 40 MG: 40 INJECTION, POWDER, FOR SOLUTION INTRAMUSCULAR; INTRAVENOUS at 06:19

## 2017-01-01 RX ADMIN — GUAIFENESIN 600 MG: 600 TABLET, EXTENDED RELEASE ORAL at 09:49

## 2017-01-01 RX ADMIN — METHYLPREDNISOLONE SODIUM SUCCINATE 40 MG: 40 INJECTION, POWDER, FOR SOLUTION INTRAMUSCULAR; INTRAVENOUS at 18:35

## 2017-01-01 RX ADMIN — FOLIC ACID 1 MG: 1 TABLET ORAL at 10:05

## 2017-01-01 RX ADMIN — Medication 10 ML: at 13:10

## 2017-01-01 RX ADMIN — NYSTATIN 5 ML: 100000 SUSPENSION ORAL at 18:09

## 2017-01-01 RX ADMIN — Medication 100 MG: at 09:51

## 2017-01-01 RX ADMIN — IPRATROPIUM BROMIDE AND ALBUTEROL SULFATE 3 ML: .5; 3 SOLUTION RESPIRATORY (INHALATION) at 07:32

## 2017-01-01 RX ADMIN — NYSTATIN 5 ML: 100000 SUSPENSION ORAL at 09:53

## 2017-01-01 RX ADMIN — IPRATROPIUM BROMIDE AND ALBUTEROL SULFATE 3 ML: .5; 3 SOLUTION RESPIRATORY (INHALATION) at 20:42

## 2017-01-01 RX ADMIN — NYSTATIN 5 ML: 100000 SUSPENSION ORAL at 13:14

## 2017-01-01 RX ADMIN — FOLIC ACID 1 MG: 1 TABLET ORAL at 09:45

## 2017-01-01 RX ADMIN — METHYLPREDNISOLONE SODIUM SUCCINATE 40 MG: 40 INJECTION, POWDER, FOR SOLUTION INTRAMUSCULAR; INTRAVENOUS at 18:18

## 2017-01-01 RX ADMIN — NYSTATIN 5 ML: 100000 SUSPENSION ORAL at 13:42

## 2017-01-01 RX ADMIN — IPRATROPIUM BROMIDE AND ALBUTEROL SULFATE 3 ML: .5; 3 SOLUTION RESPIRATORY (INHALATION) at 07:21

## 2017-01-01 RX ADMIN — NYSTATIN 5 ML: 100000 SUSPENSION ORAL at 11:50

## 2017-01-01 RX ADMIN — METHYLPREDNISOLONE SODIUM SUCCINATE 40 MG: 40 INJECTION, POWDER, FOR SOLUTION INTRAMUSCULAR; INTRAVENOUS at 05:45

## 2017-01-01 RX ADMIN — IPRATROPIUM BROMIDE AND ALBUTEROL SULFATE 3 ML: .5; 3 SOLUTION RESPIRATORY (INHALATION) at 14:57

## 2017-01-01 RX ADMIN — IPRATROPIUM BROMIDE 1 DOSE: 0.5 SOLUTION RESPIRATORY (INHALATION) at 14:16

## 2017-01-01 RX ADMIN — ACETAMINOPHEN 650 MG: 325 TABLET ORAL at 21:23

## 2017-01-01 RX ADMIN — METHYLPREDNISOLONE SODIUM SUCCINATE 40 MG: 40 INJECTION, POWDER, FOR SOLUTION INTRAMUSCULAR; INTRAVENOUS at 11:42

## 2017-01-01 RX ADMIN — GABAPENTIN 200 MG: 100 CAPSULE ORAL at 18:35

## 2017-01-01 RX ADMIN — ACETAMINOPHEN 650 MG: 325 TABLET ORAL at 06:19

## 2017-01-01 RX ADMIN — IPRATROPIUM BROMIDE AND ALBUTEROL SULFATE 3 ML: .5; 3 SOLUTION RESPIRATORY (INHALATION) at 11:18

## 2017-01-01 RX ADMIN — ACETAMINOPHEN 650 MG: 325 TABLET ORAL at 23:45

## 2017-01-01 RX ADMIN — IPRATROPIUM BROMIDE AND ALBUTEROL SULFATE 3 ML: .5; 3 SOLUTION RESPIRATORY (INHALATION) at 20:31

## 2017-01-01 RX ADMIN — THERA TABS 1 TABLET: TAB at 11:56

## 2017-01-01 RX ADMIN — LISINOPRIL 10 MG: 5 TABLET ORAL at 11:42

## 2017-01-01 RX ADMIN — IPRATROPIUM BROMIDE AND ALBUTEROL SULFATE 3 ML: .5; 3 SOLUTION RESPIRATORY (INHALATION) at 07:47

## 2017-01-01 RX ADMIN — ENOXAPARIN SODIUM 40 MG: 40 INJECTION SUBCUTANEOUS at 18:38

## 2017-01-01 RX ADMIN — FLUTICASONE FUROATE AND VILANTEROL TRIFENATATE 1 PUFF: 100; 25 POWDER RESPIRATORY (INHALATION) at 13:14

## 2017-01-01 RX ADMIN — METHYLPREDNISOLONE SODIUM SUCCINATE 40 MG: 40 INJECTION, POWDER, FOR SOLUTION INTRAMUSCULAR; INTRAVENOUS at 12:14

## 2017-01-01 RX ADMIN — NYSTATIN 5 ML: 100000 SUSPENSION ORAL at 18:28

## 2017-01-01 RX ADMIN — ACETAMINOPHEN 650 MG: 325 TABLET ORAL at 05:45

## 2017-01-01 RX ADMIN — GABAPENTIN 200 MG: 100 CAPSULE ORAL at 18:38

## 2017-01-01 RX ADMIN — ENOXAPARIN SODIUM 40 MG: 40 INJECTION SUBCUTANEOUS at 18:24

## 2017-01-01 RX ADMIN — METHYLPREDNISOLONE SODIUM SUCCINATE 40 MG: 40 INJECTION, POWDER, FOR SOLUTION INTRAMUSCULAR; INTRAVENOUS at 11:38

## 2017-01-01 RX ADMIN — BUDESONIDE 500 MCG: 0.5 SUSPENSION RESPIRATORY (INHALATION) at 20:18

## 2017-01-01 RX ADMIN — ASPIRIN 81 MG 81 MG: 81 TABLET ORAL at 10:05

## 2017-01-01 RX ADMIN — GADOTERIDOL 15 ML: 279.3 INJECTION, SOLUTION INTRAVENOUS at 21:00

## 2017-01-01 RX ADMIN — GADOTERIDOL 20 ML: 279.3 INJECTION, SOLUTION INTRAVENOUS at 18:59

## 2017-01-01 RX ADMIN — Medication 10 ML: at 13:33

## 2017-01-01 RX ADMIN — GUAIFENESIN 600 MG: 600 TABLET, EXTENDED RELEASE ORAL at 21:17

## 2017-02-27 NOTE — TELEPHONE ENCOUNTER
Danika Heck states that they did not received the orders fro 9/13/16, medications have been re-pended for review.

## 2017-05-08 NOTE — MR AVS SNAPSHOT
Visit Information Date & Time Provider Department Dept. Phone Encounter #  
 5/8/2017  9:45 AM Nki Noel, [address] [place] [address],[address] [phone] [id number] Follow-up Instructions Return in about 6 months (around 11/8/2017) for HTN. Follow-up and Disposition History Upcoming Health Maintenance Date Due Pneumococcal 19-64 Highest Risk (2 of 3 - PCV13) 1/1/2011 INFLUENZA AGE 9 TO ADULT 8/1/2017 COLONOSCOPY 10/5/2025 DTaP/Tdap/Td series (2 - Td) 10/16/2025 Allergies as of 5/8/2017  Review Complete On: 5/8/2017 By: Sara Gross Severity Noted Reaction Type Reactions Amoxicillin  05/21/2012    Other (comments) Headache Hydrochlorothiazide  11/20/2014    Other (comments) Hyponatremia Current Immunizations  Reviewed on 9/5/2012 Name Date Influenza Vaccine 11/14/2014 Influenza Vaccine Marene Coats) 10/16/2015 Influenza Vaccine PF 10/29/2013 Influenza Vaccine Split 9/6/2012  5:32 PM  
 Pneumococcal Vaccine (Unspecified Type) 1/1/2010 Tdap 10/16/2015 Not reviewed this visit You Were Diagnosed With   
  
 Codes Comments Malignant neoplasm of upper lobe of right lung ([facility]    -  Primary ICD-10-CM: C34.11 ICD-9-CM: 162.3 Chronic obstructive pulmonary disease, unspecified COPD type ([facility]     ICD-10-CM: J44.9 ICD-9-CM: 772 Essential hypertension     ICD-10-CM: I10 
ICD-9-CM: 401.9 Hyperlipidemia, unspecified hyperlipidemia type     ICD-10-CM: E78.5 ICD-9-CM: 272.4 B12 deficiency     ICD-10-CM: E53.8 ICD-9-CM: 266.2 Vitals BP Pulse Temp Resp Height(growth percentile) Weight(growth percentile) 120/77 (BP 1 Location: Left arm, BP Patient Position: Sitting) 88 97.8 °F (36.6 °C) (Oral) 16 6' (1.829 m) 170 lb 3.2 oz (77.2 kg) SpO2 BMI Smoking Status 96% 23.08 kg/m2 Current Every Day Smoker Vitals History BMI and BSA Data Body Mass Index Body Surface Area 23.08 kg/m 2 1.98 m 2 Preferred Pharmacy Pharmacy Name Phone [facility], [address] [phone] Your Updated Medication List  
  
   
This list is accurate as of: 5/8/17 10:19 AM.  Always use your most recent med list.  
  
  
  
  
 * albuterol 2.5 mg /3 mL (0.083 %) nebulizer solution Commonly known as:  PROVENTIL VENTOLIN Use by nebulization every 4 hours as needed for wheezing as direceted * albuterol 90 mcg/actuation inhaler Commonly known as:  PROAIR HFA Take 2 Puffs by inhalation every four (4) hours as needed for Wheezing. amLODIPine 5 mg tablet Commonly known as:  Rekha Half Take 1 Tab by mouth daily. beclomethasone 80 mcg/actuation [employer] Commonly known as:  QVAR Take 2 Puffs by inhalation two (2) times a day.  
  
 gabapentin 100 mg capsule Commonly known as:  NEURONTIN Take 2 Caps by mouth two (2) times a day. Indications: nerve damage  
  
 lisinopril 10 mg tablet Commonly known as:  Bailey Neve Take 1 Tab by mouth daily. OTHER Chemo treatment every Thursday  
  
 pantoprazole 40 mg tablet Commonly known as:  PROTONIX  
  
 predniSONE 5 mg tablet Commonly known as:  Earlie Reveal Take  by mouth. tiotropium 18 mcg inhalation capsule Commonly known as:  [address] Take 1 Cap by inhalation daily. * Notice: This list has 2 medication(s) that are the same as other medications prescribed for you. Read the directions carefully, and ask your doctor or other care provider to review them with you. Prescriptions Sent to Pharmacy Refills  
 lisinopril (PRINIVIL, ZESTRIL) 10 mg tablet 3 Sig: Take 1 Tab by mouth daily. Class: Normal  
 Pharmacy: [address]  #: [phone]  Route: Oral  
 beclomethasone (QVAR) 80 mcg/actuation aero 3  
 Sig: Take 2 Puffs by inhalation two (2) times a day. Class: Normal  
 Pharmacy: [address] Ph #: [phone] Route: Inhalation  
 amLODIPine (NORVASC) 5 mg tablet 3 Sig: Take 1 Tab by mouth daily. Class: Normal  
 Pharmacy: [address] Ph #: [phone] Route: Oral  
  
Follow-up Instructions Return in about 6 months (around 11/8/2017) for HTN. Introducing [facility]! Dear Jonny Mccann: Thank you for requesting a [employer] account. Our records indicate that you already have an active [employer] account. You can access your account anytime at [email or web address]. [employer] Did you know that you can access your hospital and ER discharge instructions at any time in [employer]? You can also review all of your test results from your hospital stay or ER visit. Additional Information If you have questions, please visit the Frequently Asked Questions section of the [employer] website at [email or web address]. [employer]/. Remember, [employer] is NOT to be used for urgent needs. For medical emergencies, dial 911. Now available from your iPhone and Android! Please provide this summary of care documentation to your next provider. Your primary care clinician is listed as [place]. If you have any questions after today's visit, please call [phone].

## 2017-05-08 NOTE — PROGRESS NOTES
Subjective:  Mr. Zakia Handley is a patient of mine who presents today for follow up. 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 In March 2016 he was admitted to Dr. Angela Kinney service, underwent bronchoscopy with biopsy and VATS resection. He is now getting chemotherapy. Not a candidate for XRT due to his lung function. He just completed 12 days of steroids, which stopped his cough and congestion; \"when the steroids stopped that stuff started coming back. Sees Dr. Shirlee Eisenmenger also for CLL. Breathing is at baseline; \"typical COPD. \"  Sees Dr. Perry Casiano, Dr. Patrick Cuevas. At this time, he has no new complaints today. For all issues addressed today, see A and P below. Past Medical History:  Hypertension, COPD (FEV1 of 1.00 in 2012), and elevated liver enzymes. Past Surgical History:  TURP and L-spine laminectomy. Colonoscopy 2006, normal.  L THR Dr. Miya Romeo 2014. R THR 2014. Colonoscopy in 2015: hemorrhoids. EGD 2015 with dilatation: Esophagitis, GERD, esophageal stricture. Allergies:  Aminophylline caused headaches. Medications:    Current Outpatient Prescriptions on File Prior to Visit   Medication Sig Dispense Refill    beclomethasone (QVAR) 80 mcg/actuation inhaler Take 2 Puffs by inhalation two (2) times a day. 8.7 g 3    albuterol (PROAIR HFA) 90 mcg/actuation inhaler Take 2 Puffs by inhalation every four (4) hours as needed for Wheezing. 3 Inhaler 3    tiotropium (SPIRIVA WITH HANDIHALER) 18 mcg inhalation capsule Take 1 Cap by inhalation daily. 90 Cap 3    albuterol (PROVENTIL VENTOLIN) 2.5 mg /3 mL (0.083 %) nebulizer solution Use by nebulization every 4 hours as needed for wheezing as direceted 3 Package 3    gabapentin (NEURONTIN) 100 mg capsule Take 2 Caps by mouth two (2) times a day.  Indications: nerve damage 60 Cap 2    OTHER Chemo treatment every Thursday      amLODIPine (NORVASC) 5 mg tablet Take 1 Tab by mouth daily. 90 Tab 3    lisinopril (PRINIVIL, ZESTRIL) 10 mg tablet Take 1 Tab by mouth daily. 90 Tab 3    pantoprazole (PROTONIX) 40 mg tablet        No current facility-administered medications on file prior to visit. Social History:  He is . He works as , previously for a correctional facility. He has a history of heavy drinking and reports that he has twenty-four cans of beer per week at this time. He is a former smoker of a pack and one half per day for 30 years; quit in . Family History:  Includes heart problems and high blood pressure. His father  64 stroke, and was alcoholic. His mother, alive, has COPD. Review of Systems:  Performed and otherwise unremarkable except as noted elsewhere. Physical Examination:    Vitals:   Visit Vitals    /77 (BP 1 Location: Left arm, BP Patient Position: Sitting)    Pulse 88    Temp 97.8 °F (36.6 °C) (Oral)    Resp 16    Ht 6' (1.829 m)    Wt 170 lb 3.2 oz (77.2 kg)    SpO2 96%    BMI 23.08 kg/m2     GENERAL APPEARANCE:  Mr. Subha Alexander is a pleasant white male in no acute distress. HEENT:  KIMI. EOMI. OP moist and pink. NECK:  Supple. LUNGS:  CTAB with somewhat distant breath sounds. Scattered wheezes noted. HEART:  RRR. ABDOMEN:  S, NT, ND with BS present. EXTREMITIES:  Warm with no edema. Lab Results   Component Value Date/Time    Cholesterol, total 149 2016 07:58 AM    HDL Cholesterol 75 2016 07:58 AM    LDL, calculated 59 2016 07:58 AM    VLDL, calculated 15 2016 07:58 AM    Triglyceride 73 2016 07:58 AM         Assessment/Plan:    1. Lung cancer. Pulmonary following. Chemo per Oncology. 2. Dysphagia: As per Dr. Heidi Merritt   3. Hypertension:  Blood pressure is low today--?dehydration. Drink a lot of fluids. If doesn't improve, then ER or Ca CTR for IV fluids. 4. Hyponatremia  5.  COPD: Stable. Quite asymptomatic for the severity of his FEV1. For now, continue his current inhalers. Seeing pulmonary now. 6. Asthma: We will continue his inhalers. 7. Hyperlipidemia:  Excellent at last check. 8. Abnormal LFTs:  Dr. Abisai Wilson is watching labs--defer for now. Follow-up Disposition:  Return in about 6 months (around 11/8/2017) for HTN.

## 2017-05-08 NOTE — PROGRESS NOTES
1. Have you been to the ER, urgent care clinic since your last visit? Hospitalized since your last visit?no    2. Have you seen or consulted any other health care providers outside of the [address] [place] [address] since your last visit? Include any pap smears or colon screening.  no

## 2017-07-29 NOTE — ED PROVIDER NOTES
HPI Comments: Tisha Bellamy is a 63 y.o. male with PMhx significant for HTN, asthma, COPD who presents ambulatory to the ED with cc of a skin tear to the left upper arm s/p falling in his home 2 days ago. Pt states he was bent over in his home, straightened up, and began to feel lightheaded for which he started to fall. He states he used his arm in an attempted to catch himself but notes his left arm fell into the trash can, resulting in a skin tear to his left arm. He states his wife has been providing wound care, wrapping the arm, however, they wanted him to be checked out today to ensure the area isn't becoming infected. He denies any fever, N/V, or further symptoms and complaints at this time. Social Hx: + Tobacco (0.25 ppd), + EtOH, - Illicit Drugs    PCP: Nena Pittman MD    There are no other complaints, changes or physical findings at this time. The history is provided by the patient. No  was used.         Past Medical History:   Diagnosis Date    Arthritis     Asthma 5/21/2010    B12 deficiency 2/19/2016    Cancer ([facility]     Lung    COPD (chronic obstructive pulmonary disease) ([facility] 5/21/2010    asthma, uses inhalers    Hyperlipidemia 5/21/2010    Hypertension 5/21/2010    Other ill-defined conditions 10/31/13    CURRENTLY BEING TRX FOR COLD    Other ill-defined conditions     AVASCULAR NECROSIS OF HIPS/ bilateral hip replacement        Past Surgical History:   Procedure Laterality Date    HX GI      COLONOSCOPY    HX HEENT      WISDOM TEETH    HX HIP REPLACEMENT Right 2014    Dr. Cam Schilder  11/2014    L hip    HX LUMBAR LAMINECTOMY  1990s    HX ORTHOPAEDIC  25 years ago    lumbar laminectomy    HX ORTHOPAEDIC      left hip replacement    HX OTHER SURGICAL      egd    HX OTHER SURGICAL      bronchoscopy with biopsy    TOTAL HIP ARTHROPLASTY Right 11/2013         Family History:   Problem Relation Age of Onset    Diabetes Father      father  Lung Disease Mother      COPD    Hypertension Mother     Obesity Sister     Anesth Problems Neg Hx        Social History     Social History    Marital status:      Spouse name: N/A    Number of children: N/A    Years of education: N/A     Occupational History    Not on file. Social History Main Topics    Smoking status: Current Every Day Smoker     Packs/day: 0.25     Years: 30.00     Types: Cigarettes    Smokeless tobacco: Never Used    Alcohol use 12.0 oz/week     24 Cans of beer per week      Comment: per week    Drug use: Yes     Special: Prescription, OTC    Sexual activity: Not Currently     Partners: Female     Other Topics Concern    Not on file     Social History Narrative         ALLERGIES: Amoxicillin and Hydrochlorothiazide    Review of Systems   Constitutional: Negative. Negative for fever. HENT: Negative. Eyes: Negative. Respiratory: Negative. Cardiovascular: Negative. Gastrointestinal: Negative. Negative for nausea and vomiting. Genitourinary: Negative. Musculoskeletal: Negative. Skin: Positive for wound. Neurological: Negative. Hematological: Negative. Psychiatric/Behavioral: Negative. Vitals:    07/29/17 0726   BP: 136/80   Pulse: 96   Resp: 18   Temp: 98 °F (36.7 °C)   SpO2: 97%   Weight: 73 kg (160 lb 15 oz)   Height: 6' (1.829 m)            Physical Exam   Constitutional: He is oriented to person, place, and time. He appears well-developed and well-nourished. No distress. HENT:   Head: Normocephalic and atraumatic. Right Ear: External ear normal.   Left Ear: External ear normal.   Nose: Nose normal.   Mouth/Throat: Oropharynx is clear and moist. No oropharyngeal exudate. Eyes: Conjunctivae and EOM are normal. Pupils are equal, round, and reactive to light. Right eye exhibits no discharge. Left eye exhibits no discharge. No scleral icterus. Neck: Normal range of motion. Neck supple. No JVD present.  No tracheal deviation present. Cardiovascular: Normal rate, regular rhythm, normal heart sounds and intact distal pulses. Exam reveals no gallop and no friction rub. No murmur heard. Pulmonary/Chest: Effort normal and breath sounds normal. No respiratory distress. He has no wheezes. He has no rales. He exhibits no tenderness. Abdominal: Soft. Bowel sounds are normal. He exhibits no distension and no mass. There is no tenderness. There is no rebound and no guarding. Musculoskeletal: Normal range of motion. He exhibits no edema or tenderness. Lymphadenopathy:     He has no cervical adenopathy. Neurological: He is alert and oriented to person, place, and time. He has normal reflexes. No cranial nerve deficit. He exhibits normal muscle tone. Coordination normal.   Skin: Skin is warm and dry. He is not diaphoretic. There is a skin tear to the left upper arm with thick, green purulent drainage and self approximation. No significant surrounding cellulitis. Psychiatric: He has a normal mood and affect. His behavior is normal. Judgment and thought content normal.   Nursing note and vitals reviewed. MDM  Number of Diagnoses or Management Options  Skin infection:   Visit for wound check:   Diagnosis management comments: DDx: Wound check, skin tear, cellulitis       Amount and/or Complexity of Data Reviewed  Review and summarize past medical records: yes    Patient Progress  Patient progress: stable    ED Course       Procedures      MEDICATIONS GIVEN:  Medications   cefTRIAXone (ROCEPHIN) 1 g in lidocaine (PF) (XYLOCAINE) 10 mg/mL (1 %) IM injection (1 g IntraMUSCular Given 7/29/17 0742)       IMPRESSION:  1. Visit for wound check    2. Skin infection        PLAN:  1. Current Discharge Medication List      START taking these medications    Details   oxyCODONE-acetaminophen (PERCOCET) 5-325 mg per tablet Take 1 Tab by mouth every six (6) hours as needed for Pain. Max Daily Amount: 4 Tabs.   Qty: 10 Tab, Refills: 0 doxycycline (VIBRA-TABS) 100 mg tablet Take 1 Tab by mouth two (2) times a day for 7 days. Qty: 14 Tab, Refills: 0           2. Follow-up Information     Follow up With Details Comments Contact Info    aMy Vivas MD In 2 days As needed [address] [facility]  [place]  [phone]          Return to ED if worse     Discharge Note:  7:41 AM  The patient has been re-evaluated and is ready for discharge. Reviewed available results with patient. Counseled patient on diagnosis and care plan. Patient has expressed understanding, and all questions have been answered. Patient agrees with plan and agrees to follow up as recommended, or return to the ED if their symptoms worsen. Discharge instructions have been provided and explained to the patient, along with reasons to return to the ED. Attestation: This note is prepared by Florentin Camacho, acting as Scribe for [employer]. EDILMA Licea: The scribe's documentation has been prepared under my direction and personally reviewed by me in its entirety. I confirm that the note above accurately reflects all work, treatment, procedures, and medical decision making performed by me.    9:09 PM  I was personally available for consultation in the emergency department. I have reviewed the chart and agree with the documentation recorded by the [facility], including the assessment, treatment plan, and disposition.   Ami Langston MD

## 2017-07-29 NOTE — ED TRIAGE NOTES
Patient complains of skin tear to left upper arm after falling onto a trash can on Thursday. Denies other problems.

## 2017-07-29 NOTE — ED NOTES
Patient discharged by [facility]. Patient provided with discharge instructions Rx and instructions on follow up care. Patient out of ED ambulatory accompanied by family.

## 2017-08-04 NOTE — MR AVS SNAPSHOT
Visit Information Date & Time Provider Department Dept. Phone Encounter #  
 8/4/2017 11:45 AM Godwin Stairs, [address] [phone] [id number] Follow-up Instructions Return in about 3 months (around 11/4/2017) for HTN. Upcoming Health Maintenance Date Due Pneumococcal 19-64 Highest Risk (2 of 3 - PCV13) 1/1/2011 ZOSTER VACCINE AGE 60> 6/30/2017 INFLUENZA AGE 9 TO ADULT 8/1/2017 COLONOSCOPY 10/5/2025 DTaP/Tdap/Td series (2 - Td) 10/16/2025 Allergies as of 8/4/2017  Review Complete On: 8/4/2017 By: Jack Knox Severity Noted Reaction Type Reactions Amoxicillin  05/21/2012    Other (comments) Headache Hydrochlorothiazide  11/20/2014    Other (comments) Hyponatremia Current Immunizations  Reviewed on 9/5/2012 Name Date Influenza Vaccine 11/14/2014 Influenza Vaccine Rosa Corazon) 10/16/2015 Influenza Vaccine PF 10/29/2013 Influenza Vaccine Split 9/6/2012  5:32 PM  
 Tdap 10/16/2015 ZZZ-RETIRED (DO NOT USE) Pneumococcal Vaccine (Unspecified Type) 1/1/2010 Not reviewed this visit You Were Diagnosed With   
  
 Codes Comments Essential hypertension    -  Primary ICD-10-CM: I10 
ICD-9-CM: 401.9 Vitals BP Pulse Temp Resp Height(growth percentile) Weight(growth percentile) 103/67 (BP 1 Location: Left arm, BP Patient Position: Sitting) 87 97.7 °F (36.5 °C) (Oral) 16 6' (1.829 m) 157 lb 9.6 oz (71.5 kg) SpO2 BMI Smoking Status 95% 21.37 kg/m2 Current Every Day Smoker Vitals History BMI and BSA Data Body Mass Index Body Surface Area  
 21.37 kg/m 2 1.91 m 2 Preferred Pharmacy Pharmacy Name Phone [id number]  [address] [phone] Your Updated Medication List  
  
   
This list is accurate as of: 8/4/17 12:14 PM.  Always use your most recent med list.  
  
  
  
  
 * albuterol 90 mcg/actuation inhaler Commonly known as:  PROAIR HFA Take 2 Puffs by inhalation every four (4) hours as needed for Wheezing. * albuterol 2.5 mg /3 mL (0.083 %) nebulizer solution Commonly known as:  PROVENTIL VENTOLIN  
USE BY NEBULIZATION EVERY FOUR HOURS AS NEEDED FOR WHEEZING AS DIRECTED  
  
 aspirin 325 mg tablet Commonly known as:  ASPIRIN Take 81 mg by mouth daily. beclomethasone 80 mcg/actuation [employer] Commonly known as:  QVAR Take 2 Puffs by inhalation two (2) times a day.  
  
 gabapentin 100 mg capsule Commonly known as:  NEURONTIN Take 2 Caps by mouth two (2) times a day. Indications: nerve damage  
  
 lisinopril 10 mg tablet Commonly known as:  Skip Handing Take 1 Tab by mouth daily. OPDIVO IV  
by IntraVENous route. OTHER Chemo treatment every Thursday  
  
 pantoprazole 40 mg tablet Commonly known as:  PROTONIX  
  
 predniSONE 5 mg tablet Commonly known as:  Kyung Radha Take  by mouth. tiotropium 18 mcg inhalation capsule Commonly known as:  [address] Take 1 Cap by inhalation daily. VITAMIN D2 PO Take 1.25 mg by mouth. Once per week * Notice: This list has 2 medication(s) that are the same as other medications prescribed for you. Read the directions carefully, and ask your doctor or other care provider to review them with you. We Performed the Following LIPID PANEL [82895 CPT(R)] METABOLIC PANEL, COMPREHENSIVE [18759 CPT(R)] Follow-up Instructions Return in about 3 months (around 11/4/2017) for HTN. Patient Instructions Stop Amlodipine Introducing [facility]! Dear Randy Quevedo: Thank you for requesting a [employer] account. Our records indicate that you already have an active [employer] account. You can access your account anytime at [email or web address]. [employer] Did you know that you can access your hospital and ER discharge instructions at any time in [employer]? You can also review all of your test results from your hospital stay or ER visit. Additional Information If you have questions, please visit the Frequently Asked Questions section of the [employer] website at [email or web address] Remember, [employer] is NOT to be used for urgent needs. For medical emergencies, dial 911. Now available from your iPhone and Android! Please provide this summary of care documentation to your next provider. Your primary care clinician is listed as [place]. If you have any questions after today's visit, please call [phone].

## 2017-08-04 NOTE — PROGRESS NOTES
1. Have you been to the ER, urgent care clinic since your last visit? Hospitalized since your last visit?no    2. Have you seen or consulted any other health care providers outside of the [address] [place] [address] since your last visit? Include any pap smears or colon screening.  no

## 2017-08-04 NOTE — PROGRESS NOTES
Subjective:  Mr. Ibis Carlson is a patient of mine who presents today for follow up. Chief Complaint   Patient presents with    Hypertension     pt's oncologist wants pt seen for his b/p and med adjustment if needed ; pt has been getting light headed     Documentation     cigna biomnetric screening form        He has been undergoing chemotherapy for his lung cancer. Losing weight. BP low at times when checked elsewhere. \"I feel lightheaded and tired a lot. \" Dr. Annamaria Hodgson suggested he come in and discuss his BP meds. Medications:    Current Outpatient Prescriptions on File Prior to Visit   Medication Sig Dispense Refill    NIVOLUMAB (OPDIVO IV) by IntraVENous route.  aspirin (ASPIRIN) 325 mg tablet Take 81 mg by mouth daily.  albuterol (PROVENTIL VENTOLIN) 2.5 mg /3 mL (0.083 %) nebulizer solution USE BY NEBULIZATION EVERY FOUR HOURS AS NEEDED FOR WHEEZING AS DIRECTED 3 Package 2    predniSONE (DELTASONE) 5 mg tablet Take  by mouth.  lisinopril (PRINIVIL, ZESTRIL) 10 mg tablet Take 1 Tab by mouth daily. 90 Tab 3    beclomethasone (QVAR) 80 mcg/actuation aero Take 2 Puffs by inhalation two (2) times a day. 3 Inhaler 3    albuterol (PROAIR HFA) 90 mcg/actuation inhaler Take 2 Puffs by inhalation every four (4) hours as needed for Wheezing. 3 Inhaler 3    tiotropium (SPIRIVA WITH HANDIHALER) 18 mcg inhalation capsule Take 1 Cap by inhalation daily. 90 Cap 3    gabapentin (NEURONTIN) 100 mg capsule Take 2 Caps by mouth two (2) times a day. Indications: nerve damage 60 Cap 2    OTHER Chemo treatment every Thursday      pantoprazole (PROTONIX) 40 mg tablet        No current facility-administered medications on file prior to visit.         Physical Examination:    Vitals:   Visit Vitals    /67 (BP 1 Location: Left arm, BP Patient Position: Sitting)    Pulse 87    Temp 97.7 °F (36.5 °C) (Oral)    Resp 16    Ht 6' (1.829 m)    Wt 157 lb 9.6 oz (71.5 kg)    SpO2 95%    BMI 21.37 kg/m2     GENERAL APPEARANCE:  Mr. Aziza Arechiga is a pleasant white male in no acute distress. HEENT:  KIMI. EOMI. OP moist and pink. NECK:  Supple. LUNGS:  CTAB with somewhat distant breath sounds. Scattered wheezes noted. HEART:  RRR. ABDOMEN:  S, NT, ND with BS present. EXTREMITIES:  Warm with no edema. Assessment/Plan:    1. Hypertension:  Blood pressure is low today--?dehydration. Drink a lot of fluids. If doesn't improve, then ER or [facility] for IV fluids. Follow-up Disposition:  Return in about 3 months (around 11/4/2017) for HTN.

## 2017-09-08 NOTE — TELEPHONE ENCOUNTER
Patient's wife, Rishi Smith, states she needs a call back today in reference to previous message left this morning. Please see previous encounter in reference to medication problem.  Thank you

## 2017-09-08 NOTE — TELEPHONE ENCOUNTER
#[phone] Wife, Estrellita Ear states pt only received 5 days worth of albuterol in the mail from mail order. Pt will be out and she needs a call back as soon as possible this morning.

## 2017-09-12 NOTE — TELEPHONE ENCOUNTER
Wife states that she thought this was taken care of with the albuterol solution problem. She called [facility] and they have not heard from us. Pt only received a five day supply originally. Pt now needs a new script for 90 days sent to [facility]. [facility]. Can this all happen yet today being there has been such a delay? Frank Morton wants a call to make sure this has all been done correctly.   #[phone]

## 2017-09-13 NOTE — TELEPHONE ENCOUNTER
Patient's wife, Alia Forde, states she needs a call back right away in reference to patient's Albuterol prescription was supposed to be a 90 day supply to mail order & ONLY received a 5 day supply. Alia Forde states Mail order needs to be corrected to 90 day supply & also needs an emergency supply done thru [facility] as patient is running out before mail order will be received. Please call.  Thank you

## 2017-09-14 NOTE — TELEPHONE ENCOUNTER
Mrs Sabrina Guerrero walked in to office to get prescription for Albuterol nebulizer treatment called in to [facility] for 90 day supply which is 1,080 ml. Spoke with Ann Chand at [facility] called in Albuterol 90 day supply at [phone]. [facility] called in [facility] 10 day supply of albuterol to cover patient until mail order arrives.

## 2017-10-09 NOTE — ED NOTES
Pt arrives via wheelchair to room c/o dizziness, syncope and GLF with right arm pain and skin tear since yesterday. Pt also has baseline shortness of breath due to COPD. Pt states this is his normal breathing pattern which is tachypnic and with audible wheezing. Monitor x 3. Call bell in reach and plan of care discussed.

## 2017-10-09 NOTE — ED PROVIDER NOTES
[address]  EMERGENCY DEPARTMENT HISTORY AND PHYSICAL EXAM     Date of Service: 10/9/2017   Patient Name: Rancho Turner   YOB: 1955  Medical Record Number: [id number]    History of Presenting Illness     Chief Complaint   Patient presents with    Dizziness     Pt reports syncope x last Thurs and again yesterday Pt states he laid down today and felt like \"he was going to go out again. \"      History Provided By:  Patient and family    Additional History:   Rancho Turner is a 63 y.o. male with PMhx significant for HTN, lung cancer, asthma, COPD, and arthritis who presents ambulatory to the ED for evaluation following syncopal episodes. Per pt, he had a syncopal episode yesterday prior to which he was feeling increasingly dizzy. Pt reports he was walking around outside and decided to go sit inside when he felt dizzy. Pt states he was on the way to a recliner when he had a syncopal episode where he fell over a TV tray and passed out. Pt informs he went to lay down at home earlier today when he felt a similar sensation, prompting concern for evaluation. Per family, pt has had multiple syncopal episodes in the past month with intermittent dizziness which is worsened with standing and movements, and improved with sitting. Family and pt additionally note skin tears to the right upper arm following a syncopal episode x last week. Per pt, he has additionally noted intermittent congestion, somewhat chronic cough, and mid-sternal chest tightness for the past several days with a moderate intensity and no radiation. Pt also notes a mild intensity aching sensation to the right lower extremity and glute but states that may be secondary to his increasingly sedentary lifestyle. Family expresses concern for evaluation given that the pt has had to be admitted in the past secondary to low Na level.  Of note, pt and family report lung cancer with active chemotherapy treatments every other Tuesday with no radiation treatment x 2 years. Pt specifically denies any nausea, vomiting, fevers, chills, abdominal pain, current chest pain or SOB. Social Hx: + Tobacco, + EtOH, - Illicit Drugs    There are no other complaints, changes or physical findings at this time. Primary Care Provider: Dionne Elliott MD   Specialist: Dr. Donny Foster, Oncology     Past History     Past Medical History:   Past Medical History:   Diagnosis Date    Arthritis     Asthma 5/21/2010    B12 deficiency 2/19/2016    Cancer ([facility]     Lung    COPD (chronic obstructive pulmonary disease) ([facility] 5/21/2010    asthma, uses inhalers    Hyperlipidemia 5/21/2010    Hypertension 5/21/2010    Other ill-defined conditions 10/31/13    CURRENTLY BEING TRX FOR COLD    Other ill-defined conditions     AVASCULAR NECROSIS OF HIPS/ bilateral hip replacement       Past Surgical History:   Past Surgical History:   Procedure Laterality Date    HX GI      COLONOSCOPY    HX HEENT      WISDOM TEETH    HX HIP REPLACEMENT Right 2014    Dr. Daniela Fisher  11/2014    L hip    HX LUMBAR LAMINECTOMY  1990s    HX ORTHOPAEDIC  25 years ago    lumbar laminectomy    HX ORTHOPAEDIC      left hip replacement    HX OTHER SURGICAL      egd    HX OTHER SURGICAL      bronchoscopy with biopsy    TOTAL HIP ARTHROPLASTY Right 11/2013      Family History:   Family History   Problem Relation Age of Onset    Diabetes Father      father    Lung Disease Mother      COPD    Hypertension Mother     Obesity Sister     Anesth Problems Neg Hx       Social History:   Social History   Substance Use Topics    Smoking status: Current Every Day Smoker     Packs/day: 0.25     Years: 30.00     Types: Cigarettes    Smokeless tobacco: Never Used    Alcohol use 12.0 oz/week     24 Cans of beer per week      Comment: per week      Allergies:    Allergies   Allergen Reactions    Amoxicillin Other (comments)     Headache    Hydrochlorothiazide Other (comments)     Hyponatremia       Review of Systems   Review of Systems   Constitutional: Negative for chills and fever. HENT: Positive for congestion. Eyes: Negative for visual disturbance. Respiratory: Positive for cough and chest tightness. Negative for shortness of breath. Cardiovascular: Negative for chest pain and leg swelling. Gastrointestinal: Negative for abdominal pain, nausea and vomiting. Endocrine: Negative for polyuria. Genitourinary: Negative for dysuria and frequency. Musculoskeletal: Positive for arthralgias (RLE) and myalgias (RLE). Skin: Positive for wound (+ R arm skin tear). Negative for color change. Allergic/Immunologic: Negative for immunocompromised state. Neurological: Positive for dizziness and syncope. Negative for numbness. Physical Exam  Physical Exam    Nursing note and vitals reviewed. General appearance: non-toxic, NAD  Eyes: PERRL, EOMI, conjunctiva normal, anicteric sclera  HEENT: mucous membranes moist, oropharynx is clear, neck is supple, no signs head injury  Pulmonary: diffuse bilateral inspiratory and expiratory wheezing; expiratory phase prolonged, decreased air exchange, mild tachypnea  Cardiac: normal rate and regular rhythm, no murmurs, gallops, or rubs, 2+ peripheral/radial/DP pulses, no carotid bruits, cap refill 3-4 seconds, port R anterior chest wall   Abdomen: soft, nontender, nondistended, bowel sounds present, no CVA tenderness   MSK: trace pretibial edema BL  SKIN: scattered ecchymosis BUE, 4 cm skin tear to the right upper arm superior to the elbow joint   Neuro: Alert, answers questions appropriately, 5/5 strength in all 4 extremities, VFF, finger to nose normal, light touch intact in all four extremities, cranial nerves II-XII intact    Medical Decision Making   I am the first provider for this patient.      I reviewed the vital signs, available nursing notes, past medical history, past surgical history, family history and social history. Old Medical Records: Old medical records. Provider Notes:  DDx: orthostatic hypertension, metabolic derangement, dehydration, UTI, deconditioning, PNA, bronchitis, COPD exacerbation, PE, lower suspicion for ACS or metastatic bleed. Pt feels improved. D/w pt & family observation vs d/c. He feels improved and would like to go home. Advised pt of need for close f/u w/ PCP or oncologist, return if feeling worse/no better. ED Course:  3:36 PM   Initial assessment performed. The patients presenting problems have been discussed, and they are in agreement with the care plan formulated and outlined with them. I have encouraged them to ask questions as they arise throughout their visit. Progress Notes:   Progress Note:   3:36 PM  Attempted to evaluate pt, pt in imaging. Talked to family members in room to elucidate further on story. Written by Lakia Ace ED Scribe, as dictated by Marycruz Wilkes. Annalise Cadet MD.     Progress Note:   8:08 PM  Pt notes he is feeling better and would like to be discharged. Continuing expiratory wheezing, improved air exchange. Per pt & family, respiratory pattern is at baseline. Pt notes he is able to stand up better and does not feel as lightheaded after IVF's. Written by Lakia Ace ED Scribe, as dictated by Marycruz Wilkes.  Annalise Cadet MD.     Diagnostic Study Results     Labs -      Recent Results (from the past 12 hour(s))   EKG, 12 LEAD, INITIAL    Collection Time: 10/09/17  1:17 PM   Result Value Ref Range    Ventricular Rate 92 BPM    Atrial Rate 92 BPM    P-R Interval 134 ms    QRS Duration 86 ms    Q-T Interval 350 ms    QTC Calculation (Bezet) 432 ms    Calculated P Axis 83 degrees    Calculated R Axis 97 degrees    Calculated T Axis 55 degrees    Diagnosis       Sinus rhythm with premature atrial complexes  Rightward axis  Pulmonary disease pattern  When compared with ECG of 10-MAR-2016 13:01,  premature atrial complexes are now present     CBC WITH AUTOMATED DIFF Collection Time: 10/09/17  1:23 PM   Result Value Ref Range    WBC 8.6 4.1 - 11.1 K/uL    RBC 4.01 (L) 4.10 - 5.70 M/uL    HGB 13.5 12.1 - 17.0 g/dL    HCT 38.7 36.6 - 50.3 %    MCV 96.5 80.0 - 99.0 FL    MCH 33.7 26.0 - 34.0 PG    MCHC 34.9 30.0 - 36.5 g/dL    RDW 13.5 11.5 - 14.5 %    PLATELET 762 679 - 893 K/uL    NEUTROPHILS 75 32 - 75 %    LYMPHOCYTES 18 12 - 49 %    MONOCYTES 7 5 - 13 %    EOSINOPHILS 0 0 - 7 %    BASOPHILS 0 0 - 1 %    ABS. NEUTROPHILS 6.4 1.8 - 8.0 K/UL    ABS. LYMPHOCYTES 1.5 0.8 - 3.5 K/UL    ABS. MONOCYTES 0.6 0.0 - 1.0 K/UL    ABS. EOSINOPHILS 0.0 0.0 - 0.4 K/UL    ABS. BASOPHILS 0.0 0.0 - 0.1 K/UL   METABOLIC PANEL, COMPREHENSIVE    Collection Time: 10/09/17  1:23 PM   Result Value Ref Range    Sodium 130 (L) 136 - 145 mmol/L    Potassium 4.3 3.5 - 5.1 mmol/L    Chloride 96 (L) 97 - 108 mmol/L    CO2 26 21 - 32 mmol/L    Anion gap 8 5 - 15 mmol/L    Glucose 82 65 - 100 mg/dL    BUN 10 6 - 20 MG/DL    Creatinine 1.15 0.70 - 1.30 MG/DL    BUN/Creatinine ratio 9 (L) 12 - 20      GFR est AA >60 >60 ml/min/1.73m2    GFR est non-AA >60 >60 ml/min/1.73m2    Calcium 8.7 8.5 - 10.1 MG/DL    Bilirubin, total 0.5 0.2 - 1.0 MG/DL    ALT (SGPT) 17 12 - 78 U/L    AST (SGOT) 11 (L) 15 - 37 U/L    Alk. phosphatase 62 45 - 117 U/L    Protein, total 6.8 6.4 - 8.2 g/dL    Albumin 3.3 (L) 3.5 - 5.0 g/dL    Globulin 3.5 2.0 - 4.0 g/dL    A-G Ratio 0.9 (L) 1.1 - 2.2     CK W/ REFLX CKMB    Collection Time: 10/09/17  1:23 PM   Result Value Ref Range    CK 32 (L) 39 - 308 U/L   TROPONIN I    Collection Time: 10/09/17  1:23 PM   Result Value Ref Range    Troponin-I, Qt. <0.04 <0.05 ng/mL     Radiologic Studies -  The following have been ordered and reviewed:  CTA CHEST W OR W WO CONT   Final Result      XR CHEST PORT   Final Result      CT HEAD WO CONT   Final Result        CT Results  (Last 48 hours)               10/09/17 4223  CTA CHEST W OR W WO CONT Final result    Impression:  IMPRESSION:    1.  No pulmonary embolus. 2. Severe emphysema. 3. Status post resection of right upper lobe nodule with small 6 mm right upper   lobe nodule unchanged. 4. Scarring and rounded atelectasis in the posterior left lower lobe unchanged. 5. Atherosclerotic aorta with coronary artery calcifications. 6. Enlarged right hilar lymph node. 7. Thoracic spondylosis. Narrative:  EXAM: CT ANGIOGRAPHY CHEST    INDICATION: Dizziness, syncope and shortness of breath and history of lung   cancer. COMPARISON: 1/29/2016. CONTRAST: mL of Isovue-370. TECHNIQUE:    Precontrast  images were obtained to localize the volume for acquisition. Multislice helical CT arteriography was performed from the diaphragm to the   thoracic inlet during uneventful rapid bolus intravenous contrast   administration. Lung and soft tissue windows were generated. Coronal and   sagittal  reformatted images were also generated and 3D post processing   consisting of coronal maximum intensity projection images was performed. CT dose   reduction was achieved through use of a standardized protocol tailored for this   examination and automatic exposure control for dose modulation. FINDINGS:   There is a right jugular Port-A-Cath with tip in the superior vena cava. LOWER NECK: The visualized portions of the thyroid and structures of the lower   neck are within normal limits. LUNGS: There is severe emphysema throughout the lungs which are hyperinflated. There is scarring and rounded atelectasis in the posterior left lower lobe which   is unchanged. There are now staples in the left upper lobe and the left upper   lobe nodule has been resected. There is a 6 mm nodule in the right lung apex   which is unchanged. There is scarring in the left lung apex. PLEURA: There is no pleural effusion or pneumothorax. PULMONARY ARTERIES: The pulmonary arteries are well enhanced and no pulmonary   emboli are identified.    AORTA: The aorta is atherosclerotic and enhances without evidence of aneurysm or   dissection. There is coronary artery calcification. MEDIASTINUM: There is a 1.8 cm right hilar node. There is no other mediastinal   or hilar adenopathy. BONES AND SOFT TISSUES: There are mild degenerative changes of the spine. UPPER ABDOMEN: The visualized portions of the upper abdominal organs are normal.           10/09/17 1545  CT HEAD WO CONT Final result    Impression:  IMPRESSION: Normal unenhanced CT examination of the head. Narrative:  EXAM:  CT HEAD WITHOUT CONTRAST   INDICATION: Dizziness and recent syncope. COMPARISON: None. CONTRAST: None. TECHNIQUE: Unenhanced CT of the head was performed using 5 mm images. Brain and   bone windows were generated. Sagittal and coronal reformations were generated. CT dose reduction was achieved through use of a standardized protocol tailored   for this examination and automatic exposure control for dose modulation. CT dose   reduction was achieved through use of a standardized protocol tailored for this   examination and automatic exposure control for dose modulation. FINDINGS:   The ventricles and sulci are normal in size, shape and configuration and   midline. There is no significant white matter disease. There is no   intracranial hemorrhage. There is no extra-axial collection, mass, mass effect   or midline shift. The basilar cisterns are open. No acute infarct is   identified. The bone windows demonstrate no abnormalities. The visualized   portions of the paranasal sinuses and mastoid air cells are clear. CXR Results  (Last 48 hours)               10/09/17 1642  XR CHEST PORT Final result    Impression:  IMPRESSION: No acute cardiopulmonary process seen               Narrative:  EXAM:  XR CHEST PORT       INDICATION:  syncope       COMPARISON:  7/11/2017       FINDINGS: A portable AP radiograph of the chest was obtained at 1628 hours.  The Port-A-Cath tip terminates at the caval atrial junction. There is a stable   bullous change at the left base and accentuation of the interstitial markings in   the left midlung. Chain staples is noted laterally in the right upper lung. The   cardiac and mediastinal contours and pulmonary vascularity are stable. The   bones and soft tissues are grossly within normal limits. Vital Signs-Reviewed the patient's vital signs. Patient Vitals for the past 12 hrs:   Temp Pulse Resp BP SpO2   10/09/17 1612 - 74 - 121/76 97 %   10/09/17 1431 - - 24 113/79 98 %   10/09/17 1315 97.6 °F (36.4 °C) - 26 123/80 100 %     Medications Given in the ED:  Medications   albuterol-ipratropium (DUO-NEB) 2.5 MG-0.5 MG/3 ML (3 mL Nebulization Given 10/9/17 1628)   doxycycline (VIBRA-TABS) tablet 100 mg (not administered)   predniSONE (DELTASONE) tablet 40 mg (not administered)   neomycin-bacitracnZn-polymyxnB (NEOSPORIN) ointment 1 Packet (not administered)   0.9% sodium chloride infusion 1,000 mL (0 mL IntraVENous IV Completed 10/9/17 2021)   0.9% sodium chloride infusion (0 mL/hr IntraVENous IV Completed 10/9/17 2021)   iopamidol (ISOVUE-370) 76 % injection 100 mL (100 mL IntraVENous Given 10/9/17 1854)   sodium chloride (NS) flush 10 mL (10 mL IntraVENous Given 10/9/17 1854)     Pulse Oximetry Analysis - Normal 100% on RA     Cardiac Monitor:   Rate: 74  Rhythm: Normal Sinus Rhythm      EKG interpretation: (Preliminary) 1317  Rhythm: normal sinus rhythm and PAC's; and regular . Rate (approx.): 92; Axis: right axis deviation; [place] interval: 134; QRS interval: 86; ST/T wave: No ST elevation/depression; QTc: 432. .  Written by Olga Yousif, ED Scribe, as dictated by [employer]. Noe Orozco MD    Diagnosis   Clinical Impression:   1. Acute exacerbation of chronic obstructive pulmonary disease (COPD) ([address]    2. Dizziness    3. Syncope and collapse    4.  Hyponatremia       Plan:  1:   Follow-up Information     Follow up With Details Comments Contact Info    Marie Cabral MD Schedule an appointment as soon as possible for a visit in 2 days FOR FOLLOW UP OF YOUR LIGHTHEADEDNESS AND FAINTING [address] [facility]  [phone]      [facility] EMERGENCY DEPT Go in 1 day If symptoms worsen [address] Valentine Newsome MD Schedule an appointment as soon as possible for a visit in 2 days FOR FOLLOW UP WITH YOUR ONCOLOGIST [address]  [address]  [facility]  [phone]          2:   Discharge Medication List as of 10/9/2017  8:41 PM      START taking these medications    Details   ! ! predniSONE (DELTASONE) 20 mg tablet Take 3 Tabs by mouth daily for 4 days. , Normal, Disp-12 Tab, R-0      doxycycline (VIBRA-TABS) 100 mg tablet Take 1 Tab by mouth two (2) times a day for 10 days. , Normal, Disp-20 Tab, R-0       !! - Potential duplicate medications found. Please discuss with provider. CONTINUE these medications which have NOT CHANGED    Details   SPIRIVA WITH HANDIHALER 18 mcg inhalation capsule INHALE CONTENTS OF 1 CAPSULE THROUGH HANDIHALER DEVICE DAILY, Normal, Disp-90 Cap, R-2      albuterol (PROVENTIL VENTOLIN) 2.5 mg /3 mL (0.083 %) nebulizer solution USE VIA NEBULIZER EVERY FOUR HOURS AS NEEDED FOR WHEEZING AS DIRECTED, Normal, Disp-90 Package, R-0      ERGOCALCIFEROL, VITAMIN D2, (VITAMIN D2 PO) Take 1.25 mg by mouth. Once per week, Historical Med      NIVOLUMAB (OPDIVO IV) by IntraVENous route., Historical Med      aspirin (ASPIRIN) 325 mg tablet Take 81 mg by mouth daily. , Historical Med      !! predniSONE (DELTASONE) 5 mg tablet Take  by mouth., Historical Med      lisinopril (PRINIVIL, ZESTRIL) 10 mg tablet Take 1 Tab by mouth daily. , Normal, Disp-90 Tab, R-3      beclomethasone (QVAR) 80 mcg/actuation aero Take 2 Puffs by inhalation two (2) times a day., Normal, Disp-3 Inhaler, R-3      albuterol (PROAIR HFA) 90 mcg/actuation inhaler Take 2 Puffs by inhalation every four (4) hours as needed for Wheezing., Normal, Disp-3 Inhaler, R-3      gabapentin (NEURONTIN) 100 mg capsule Take 2 Caps by mouth two (2) times a day. Indications: nerve damage, Print, Disp-60 Cap, R-2      OTHER Chemo treatment every Thursday, Historical Med      pantoprazole (PROTONIX) 40 mg tablet Historical Med       !! - Potential duplicate medications found. Please discuss with provider. Return to ED if Worse    Disposition Note:  DISCHARGE NOTE:    8:46 PM  The patient is ready for discharge. The patient signs, symptoms, diagnosis, and discharge instructions have been discussed and the patient has conveyed their understanding. The patient is to follow-up as reccommended or returned to the ER should their symptoms worsen. Plan has been discussed and patient is in agreement.   _______________________________   Attestations: This note is prepared by Harrison Edward, acting as Scribe for [employer]. MD Felipe Herrera MD: The scribe's documentation has been prepared under my direction and personally reviewed by me in its entirety.  I confirm that the note above accurately reflects all work, treatment, procedures, and medical decision making performed by me.  _______________________________

## 2017-10-10 NOTE — ED NOTES
Pt received discharge instructions from Dr. Stan Alvarez and verbalized understanding. Pt wheeled to exit.

## 2017-10-10 NOTE — DISCHARGE INSTRUCTIONS
Electrolyte Imbalance: Care Instructions  Your Care Instructions  Electrolytes are minerals in your blood. They include sodium, potassium, calcium, and magnesium. When they are not at the right levels, you can feel very ill. You may not know what is causing it, but you know something is wrong. You may feel weak or numb, have muscle spasms, or twitch. Your heart may beat fast. Symptoms are different with each mineral. Too much is as bad as too little. Minerals help keep your body working as it should. Vomiting, diarrhea, and fever can cause you to lose minerals. A problem with your kidneys can tip a mineral out of balance. So can taking certain medicines. Your doctor may do more tests. He or she may change your medicine and diet. If you are low in one or more minerals, they may be given through a tube into your vein (IV). Your doctor may have you take or drink special fluids or pills. If your kidneys are failing, your blood may be filtered. This is called dialysis. It can put the minerals back in balance. Follow-up care is a key part of your treatment and safety. Be sure to make and go to all appointments, and call your doctor if you are having problems. It's also a good idea to know your test results and keep a list of the medicines you take. How can you care for yourself at home? · Take your medicines exactly as prescribed. Call your doctor if you have any problems with your medicines. You will get more details on the specific medicines your doctor prescribes. · Do not take any medicine without talking to your doctor first. This includes prescription, over-the-counter, and herbal medicines. · If you have kidney, heart, or liver disease and have to limit fluids, talk with your doctor before you increase the amount of fluids you drink. · Your doctor or dietitian may give you a diet plan to help balance your minerals. Follow the diet carefully. When should you call for help?   Call 911 anytime you think you may need emergency care. For example, call if:  · You passed out (lost consciousness). · Your heart seems to be speeding up and then slowing down or skipping beats. Call your doctor now or seek immediate medical care if:  · You are very tired and have no energy. · You have trouble thinking or concentrating. Watch closely for changes in your health, and be sure to contact your doctor if:  · You want advice on what to do to keep your minerals in balance. · You do not get better as expected. Where can you learn more? Go to http://weston-gray.info/. Enter G346 in the search box to learn more about \"Electrolyte Imbalance: Care Instructions. \"  Current as of: July 26, 2016  Content Version: 11.3  © 3378-1756 [employer]. Care instructions adapted under license by [employer] (which disclaims liability or warranty for this information). If you have questions about a medical condition or this instruction, always ask your healthcare professional. [place] [address] any warranty or liability for your use of this information. COPD Exacerbation Plan: Care Instructions  Your Care Instructions  If you have chronic obstructive pulmonary disease (COPD), your usual shortness of breath could suddenly get worse. You may start coughing more and have more mucus. This flare-up is called a COPD exacerbation (say \"[id number]-VUK-[id number]-BAY-shelbi\"). A lung infection or air pollution could set off an exacerbation. Sometimes it can happen after a quick change in temperature or being around chemicals. Work with your doctor to make a plan for dealing with an exacerbation. You can better manage it if you plan ahead. Follow-up care is a key part of your treatment and safety. Be sure to make and go to all appointments, and call your doctor if you are having problems. It's also a good idea to know your test results and keep a list of the medicines you take.   How can you care for yourself at home? During an exacerbation  · Do not panic if you start to have one. Quick treatment at home may help you prevent serious breathing problems. If you have a COPD exacerbation plan that you developed with your doctor, follow it. · Take your medicines exactly as your doctor tells you. ¨ Use your inhaler as directed by your doctor. If your symptoms do not get better after you use your medicine, have someone take you to the emergency room. Call an ambulance if necessary. ¨ With inhaled medicines, a spacer or a nebulizer may help you get more medicine to your lungs. Ask your doctor or pharmacist how to use them properly. Practice using the spacer in front of a mirror before you have an exacerbation. This may help you get the medicine into your lungs quickly. ¨ If your doctor has given you steroid pills, take them as directed. ¨ Your doctor may have given you a prescription for antibiotics, which you can fill if you need it. ¨ Talk to your doctor if you have any problems with your medicine. And call your doctor if you have to use your antibiotic or steroid pills. Preventing an exacerbation  · Do not smoke. This is the most important step you can take to prevent more damage to your lungs and prevent problems. If you already smoke, it is never too late to stop. If you need help quitting, talk to your doctor about stop-smoking programs and medicines. These can increase your chances of quitting for good. · Take your daily medicines as prescribed. · Avoid colds and flu. ¨ Get a pneumococcal vaccine. ¨ Get a flu vaccine each year, as soon as it is available. Ask those you live or work with to do the same, so they will not get the flu and infect you. ¨ Try to stay away from people with colds or the flu. ¨ Wash your hands often. · Avoid secondhand smoke; air pollution; cold, dry air; hot, humid air; and high altitudes. Stay at home with your windows closed when air pollution is bad.   · Learn breathing techniques for COPD, such as breathing through pursed lips. These techniques can help you breathe easier during an exacerbation. When should you call for help? Call 911 anytime you think you may need emergency care. For example, call if:  · You have severe trouble breathing. · You have severe chest pain. Call your doctor now or seek immediate medical care if:  · You have new or worse shortness of breath. · You develop new chest pain. · You are coughing more deeply or more often, especially if you notice more mucus or a change in the color of your mucus. · You cough up blood. · You have new or increased swelling in your legs or belly. · You have a fever. Watch closely for changes in your health, and be sure to contact your doctor if:  · You need to use your antibiotic or steroid pills. · Your symptoms are getting worse. Where can you learn more? Go to http://weston-gray.info/. Enter F715 in the search box to learn more about \"COPD Exacerbation Plan: Care Instructions. \"  Current as of: March 25, 2017  Content Version: 11.3  © 0685-9279 [employer]. Care instructions adapted under license by [employer] (which disclaims liability or warranty for this information). If you have questions about a medical condition or this instruction, always ask your healthcare professional. [address] any warranty or liability for your use of this information. Lightheadedness or Faintness: Care Instructions  Your Care Instructions  Lightheadedness is a feeling that you are about to faint or \"pass out. \" You do not feel as if you or your surroundings are moving. It is different from vertigo, which is the feeling that you or things around you are spinning or tilting. Lightheadedness usually goes away or gets better when you lie down. If lightheadedness gets worse, it can lead to a fainting spell. It is common to feel lightheaded from time to time. Lightheadedness usually is not caused by a serious problem. It often is caused by a short-lasting drop in blood pressure and blood flow to your head that occurs when you get up too quickly from a seated or lying position. Follow-up care is a key part of your treatment and safety. Be sure to make and go to all appointments, and call your doctor if you are having problems. It's also a good idea to know your test results and keep a list of the medicines you take. How can you care for yourself at home? · Lie down for 1 or 2 minutes when you feel lightheaded. After lying down, sit up slowly and remain sitting for 1 to 2 minutes before slowly standing up. · Avoid movements, positions, or activities that have made you lightheaded in the past.  · Get plenty of rest, especially if you have a cold or flu, which can cause lightheadedness. · Make sure you drink plenty of fluids, especially if you have a fever or have been sweating. · Do not drive or put yourself and others in danger while you feel lightheaded. When should you call for help? Call 911 anytime you think you may need emergency care. For example, call if:  · You have symptoms of a stroke. These may include:  ¨ Sudden numbness, tingling, weakness, or loss of movement in your face, arm, or leg, especially on only one side of your body. ¨ Sudden vision changes. ¨ Sudden trouble speaking. ¨ Sudden confusion or trouble understanding simple statements. ¨ Sudden problems with walking or balance. ¨ A sudden, severe headache that is different from past headaches. · You have symptoms of a heart attack. These may include:  ¨ Chest pain or pressure, or a strange feeling in the chest.  ¨ Sweating. ¨ Shortness of breath. ¨ Nausea or vomiting. ¨ Pain, pressure, or a strange feeling in the back, neck, jaw, or upper belly or in one or both shoulders or arms. ¨ Lightheadedness or sudden weakness. ¨ A fast or irregular heartbeat.   After you call 911, the  may tell you to chew 1 adult-strength or 2 to 4 low-dose aspirin. Wait for an ambulance. Do not try to drive yourself. Watch closely for changes in your health, and be sure to contact your doctor if:  · Your lightheadedness gets worse or does not get better with home care. Where can you learn more? Go to http://weston-gray.info/. Enter K097 in the search box to learn more about \"Lightheadedness or Faintness: Care Instructions. \"  Current as of: March 20, 2017  Content Version: 11.3  © 4602-0749 [employer]. Care instructions adapted under license by [facility] (which disclaims liability or warranty for this information). If you have questions about a medical condition or this instruction, always ask your healthcare professional. [address] any warranty or liability for your use of this information.

## 2017-11-05 PROBLEM — R06.03 ACUTE RESPIRATORY DISTRESS: Status: ACTIVE | Noted: 2017-01-01

## 2017-11-05 PROBLEM — J44.1 COPD EXACERBATION (HCC): Status: ACTIVE | Noted: 2017-01-01

## 2017-11-05 NOTE — H&P
Hospitalist Admission Note    NAME: Rambo Kulkarni   :     MRN:  [id number]     Date/Time:  2017 6:23 PM    Patient PCP: Chloe Chao MD  ________________________________________________________________________    My assessment of this patient's clinical condition and my plan of care is as follows. Mr. Luretha Nyhan is being admitted to Dr. Neda Palacios Service. Dr. Mina Van who is on call this evening for Dr. Verna Roque was notified of admission. Assessment / Plan:  Acute Respiratory Distress with Non-Infectious SIRS from COPD exacerbation:  -admit to Inpatient, telemetry  -Solumedrol  -scheduled Nebs  -ABG  -TTE and cardiac Biomarkers for completeness  -change home QVAR to Budesonide  -formulary sub for patient's home Spriva  -consult Pulm  -await UA  -no indication for antibiotics at this time  -Influenza screen  -patient without enough mucus production for sputum culture    Unresectable Squamous Cell Lung Cancer on Natan Click being held for concern that this may be affecting patient's breathing    Elevated Lactic Acid:  -start IVF's and monitor    Hypertension:  -continue home lisinopril    Recurrent Falls/Deconditioning:  -wait until breathing better before PT/OT consults  -will get MRI L-spine with and without contrast as patient reports symptoms suggestive of radiculopathy in RLE and has been having recurrent falls despite using his walker  -check B12 levle      Tobacco Abuse:  -nicotine patch    Habitual EtOH Use:  -CIWA started     Mucositis:  -swish and spit Klacks mouthwash ordered    Code Status: Partial Code; does not want to be resuscitated if he has  but okay with temporary intubation  Surrogate Decision Maker: Wife    DVT Prophylaxis: Lovenox  GI Prophylaxis: not indicated    Baseline: Poor functional status due to SOB and deconditioning        Subjective:   CHIEF COMPLAINT: recurrent falls and SOB    HISTORY OF PRESENT ILLNESS:     Bell Ariza is a 63 y.o.  male who presents with SOB worse over the last 3-4 weeks. Patient also noted increased falls since this time. Patient reports SOB with increased wheezing but denies any significant cough or sputum production. He denies any fever, dysphagia or myalgias. He states that he has been told that he only has 25% of his lung capacity. He denies any CP or swelling. He presented to the ED in respiratory distress. He has Squamous Cell cancer of the Lung and has been on Opdivo with good result but this has been held recently for concern of pulmonary toxicity. CTA chest in the ED was negative for PE. Patient also has increased falls despite using his walker and notes that his legs just Jenness Roses out. \" He denies any bowel or bladder symptoms. He reports aching pain in RLE and reports having been told by ortho that he has arthritis in his back. He denies any back pain. He was orthostatic recently until he went to his PCP and medication changes were made that improved this. Orthostatic Vital signs were unremarkable in the ED. We were asked to admit for work up and evaluation of the above problems.      Past Medical History:   Diagnosis Date    Arthritis     Asthma 5/21/2010    B12 deficiency 2/19/2016    Cancer ([facility] [address]     Lung    COPD (chronic obstructive pulmonary disease) ([facility] [address] 5/21/2010    asthma, uses inhalers    Hyperlipidemia 5/21/2010    Hypertension 5/21/2010    Other ill-defined conditions(799.89) 10/31/13    CURRENTLY BEING TRX FOR COLD    Other ill-defined conditions(799.89)     AVASCULAR NECROSIS OF HIPS/ bilateral hip replacement         Past Surgical History:   Procedure Laterality Date    HX GI      COLONOSCOPY    HX HEENT      WISDOM TEETH    HX HIP REPLACEMENT Right 2014    Dr. Karena Hernandez  11/2014    L hip    HX LUMBAR LAMINECTOMY  1990s    HX ORTHOPAEDIC  25 years ago    lumbar laminectomy    HX ORTHOPAEDIC      left hip replacement    HX OTHER SURGICAL      egd    HX OTHER SURGICAL      bronchoscopy with biopsy    TOTAL HIP ARTHROPLASTY Right 11/2013       Social History   Substance Use Topics    Smoking status: Current Every Day Smoker     Packs/day: 0.25     Years: 30.00     Types: Cigarettes    Smokeless tobacco: Never Used    Alcohol use 12.0 oz/week     24 Cans of beer per week      Comment: per week        Family History   Problem Relation Age of Onset    Diabetes Father      father    Lung Disease Mother      COPD    Hypertension Mother     Obesity Sister     Anesth Problems Neg Hx      Allergies   Allergen Reactions    Amoxicillin Other (comments)     Headache    Hydrochlorothiazide Other (comments)     Hyponatremia        Prior to Admission medications    Medication Sig Start Date End Date Taking? Authorizing Provider   albuterol (PROVENTIL VENTOLIN) 2.5 mg /3 mL (0.083 %) nebulizer solution USE VIA NEBULIZER EVERY FOUR HOURS AS NEEDED FOR WHEEZING AS DIRECTED 9/14/17  Yes Murphy العراقي MD   NIVOLUMAB (OPDIVO IV) by IntraVENous route. Yes Tarsha Hurtado MD   beclomethasone (QVAR) 80 mcg/actuation aero Take 2 Puffs by inhalation two (2) times a day. 5/8/17  Yes Murphy العراقي MD   albuterol [facility]) 90 mcg/actuation inhaler Take 2 Puffs by inhalation every four (4) hours as needed for Wheezing. 2/27/17  Yes Murphy العراقي MD   [employer] WITH HANDIHALER 18 mcg inhalation capsule INHALE CONTENTS  [address] 9/25/17   Murphy العراقي MD   ERGOCALCIFEROL, VITAMIN D2, (VITAMIN D2 PO) Take 1.25 mg by mouth. Once per week    Historical Provider   aspirin (ASPIRIN) 325 mg tablet Take 81 mg by mouth daily. Tarsha Hurtado MD   predniSONE (DELTASONE) 5 mg tablet Take  by mouth. Historical Provider   lisinopril (PRINIVIL, ZESTRIL) 10 mg tablet Take 1 Tab by mouth daily. 5/8/17   Murphy العراقي MD   gabapentin (NEURONTIN) 100 mg capsule Take 2 Caps by mouth two (2) times a day.  Indications: nerve damage 7/1/16   Yessenia Joe Cheri Peralta MD   OTHER Chemo treatment every Thursday    Historical Provider   pantoprazole (PROTONIX) 40 mg tablet  3/14/16   Historical Provider       REVIEW OF SYSTEMS:     A complete 14 point ROS was reviewed and is negative other than stated as per HPI with the exception of easy bruising and that his skin tears easily. He has also been constipated and used a suppository this am.    Objective:   VITALS:    Visit Vitals    /74 (BP 1 Location: Right arm, BP Patient Position: Supine)    Pulse (!) 125    Temp 98.4 °F (36.9 °C)    Resp (!) 34    Ht 6' (1.829 m)    Wt 77.1 kg (170 lb)    SpO2 99%    BMI 23.06 kg/m2       PHYSICAL EXAM:    General:    Alert, cooperative, mild respiratory distress at rest, appears older than stated age. HEENT: Atraumatic, anicteric sclerae, pink conjunctivae     No oral ulcers, mucosa dry, mild mucositis, dentition fair  Neck:  Supple, symmetrical,  Thyroid not enlarged  Lungs:   RR 30-40, wheezing on exam, no rhonchi or rales  Chest wall:  No tenderness  + Accessory muscle use. Heart:   Regular  Rhythm with tachycardia,  No  murmur   No edema  Abdomen:   Soft, non-tender. Not distended. Bowel sounds present  Extremities: No cyanosis. No clubbing, skin turgor decreased  Skin:     Not pale. Not Jaundiced  Scattered Ecchymoses noted on arms. Psych:  Fair insight. Not depressed. Seems mildly anxious but not agitated. Neurologic: EOMs intact. No facial asymmetry. No aphasia or slurred speech. Generalized weakness noted. Alert and oriented X 4. Hard of Hearing.    _______________________________________________________________________  Care Plan discussed with:    Comments   Patient x    Family  x    RN x    Care Manager                    Consultant:      _______________________________________________________________________  Expected  Disposition:   Home with Family x   HH/PT/OT/RN ?   SNF/LTC ? ?    ROSIE ________________________________________________________________________  TOTAL TIME:  70 Minutes    Critical Care Provided     Minutes non procedure based      Comments    x Reviewed previous records   >50% of visit spent in counseling and coordination of care x Discussion with patient and/or family and questions answered       ________________________________________________________________________  Signed: Lizzeth Moore MD    Procedures: see electronic medical records for all procedures/Xrays and details which were not copied into this note but were reviewed prior to creation of Plan. LAB DATA REVIEWED:    Recent Results (from the past 24 hour(s))   CBC WITH AUTOMATED DIFF    Collection Time: 11/05/17  3:09 PM   Result Value Ref Range    WBC 9.0 4.1 - 11.1 K/uL    RBC 4.41 4.10 - 5.70 M/uL    HGB 14.7 12.1 - 17.0 g/dL    HCT 42.1 36.6 - 50.3 %    MCV 95.5 80.0 - 99.0 FL    MCH 33.3 26.0 - 34.0 PG    MCHC 34.9 30.0 - 36.5 g/dL    RDW 13.4 11.5 - 14.5 %    PLATELET 699 292 - 301 K/uL    NEUTROPHILS 75 32 - 75 %    LYMPHOCYTES 17 12 - 49 %    MONOCYTES 7 5 - 13 %    EOSINOPHILS 1 0 - 7 %    BASOPHILS 0 0 - 1 %    ABS. NEUTROPHILS 6.7 1.8 - 8.0 K/UL    ABS. LYMPHOCYTES 1.5 0.8 - 3.5 K/UL    ABS. MONOCYTES 0.6 0.0 - 1.0 K/UL    ABS. EOSINOPHILS 0.1 0.0 - 0.4 K/UL    ABS. BASOPHILS 0.0 0.0 - 0.1 K/UL   METABOLIC PANEL, COMPREHENSIVE    Collection Time: 11/05/17  3:09 PM   Result Value Ref Range    Sodium 131 (L) 136 - 145 mmol/L    Potassium 4.4 3.5 - 5.1 mmol/L    Chloride 94 (L) 97 - 108 mmol/L    CO2 27 21 - 32 mmol/L    Anion gap 10 5 - 15 mmol/L    Glucose 73 65 - 100 mg/dL    BUN 10 6 - 20 MG/DL    Creatinine 1.12 0.70 - 1.30 MG/DL    BUN/Creatinine ratio 9 (L) 12 - 20      GFR est AA >60 >60 ml/min/1.73m2    GFR est non-AA >60 >60 ml/min/1.73m2    Calcium 9.3 8.5 - 10.1 MG/DL    Bilirubin, total 0.5 0.2 - 1.0 MG/DL    ALT (SGPT) 34 12 - 78 U/L    AST (SGOT) 28 15 - 37 U/L    Alk.  phosphatase 66 45 - 117 U/L    Protein, total 7.8 6.4 - 8.2 g/dL    Albumin 3.6 3.5 - 5.0 g/dL    Globulin 4.2 (H) 2.0 - 4.0 g/dL    A-G Ratio 0.9 (L) 1.1 - 2.2     CK W/ CKMB & INDEX    Collection Time: 11/05/17  3:09 PM   Result Value Ref Range    CK 75 39 - 308 U/L    CK - MB 2.4 <3.6 NG/ML    CK-MB Index 3.2 (H) 0 - 2.5     TROPONIN I    Collection Time: 11/05/17  3:09 PM   Result Value Ref Range    Troponin-I, Qt. <0.04 <0.05 ng/mL   LACTIC ACID    Collection Time: 11/05/17  3:09 PM   Result Value Ref Range    Lactic acid 3.9 (HH) 0.4 - 2.0 MMOL/L   EKG, 12 LEAD, INITIAL    Collection Time: 11/05/17  3:35 PM   Result Value Ref Range    Ventricular Rate 101 BPM    Atrial Rate 101 BPM    P-R Interval 126 ms    QRS Duration 94 ms    Q-T Interval 346 ms    QTC Calculation (Bezet) 448 ms    Calculated P Axis 76 degrees    Calculated R Axis 99 degrees    Calculated T Axis 52 degrees    Diagnosis       Sinus tachycardia  Right atrial enlargement  Rightward axis  Possible Inferior infarct , age undetermined  Cannot rule out Anterior infarct , age undetermined  Abnormal ECG  When compared with ECG of 09-OCT-2017 13:17,  premature atrial complexes are no longer present

## 2017-11-05 NOTE — ED NOTES
Pt appears short of breath, but states this is baseline for him, due to having COPD, Asthma, and Lung CA.

## 2017-11-05 NOTE — IP AVS SNAPSHOT
[address] [facility] 
[phone] Patient: Davy Recio MRN: [id number] KELSI:[id number] You are allergic to the following Allergen Reactions Amoxicillin Other (comments) Headache Hydrochlorothiazide Other (comments) Hyponatremia Recent Documentation Height Weight BMI Smoking Status 1.829 m 74.7 kg 22.34 kg/m2 Current Every Day Smoker Emergency Contacts  (Rel.) Home Phone Work Phone Mobile Phone Maya Yanes [phone] About your hospitalization You were admitted on:  November 5, 2017 You last received care in the:  [facility] You were discharged on:  November 16, 2017 Why you were hospitalized Your primary diagnosis was:  Not on File Your diagnoses also included:  Acute Respiratory Distress, Copd Exacerbation (Hcc) Providers Seen During Your Hospitalization Provider Specialty Primary office phone Caryl Michaels MD Emergency Medicine [phone] Melonie Ramirez MD Internal Medicine [phone] Your Primary Care Physician (PCP) Primary Care Physician Office Phone Office Fax Natacha Lacey [phone] Follow-up Information Follow up With Details Comments Contact Info Melonie Ramirez MD On 12/8/2017 10;45am follow-up [address] [facility] 
[phone] Emerson Barrientos MD  as directed by her [address] [facility] 
[phone] My Medications STOP taking these medications   
 aspirin 325 mg tablet Commonly known as:  ASPIRIN Replaced by:  aspirin 81 mg chewable tablet OPDIVO IV  
   
  
 OTHER  
   
  
  
TAKE these medications as instructed Instructions Each Dose to Equal  
 Morning Noon Evening Bedtime * albuterol 90 mcg/actuation inhaler Commonly known as:  PROAIR HFA Your last dose was: Your next dose is: Take 2 Puffs by inhalation every four (4) hours as needed for Wheezing. 2 Puff * albuterol 2.5 mg /3 mL (0.083 %) nebulizer solution Commonly known as:  PROVENTIL VENTOLIN Your last dose was: Your next dose is: USE VIA NEBULIZER EVERY FOUR HOURS AS NEEDED FOR WHEEZING AS DIRECTED  
     
   
   
   
  
 albuterol-ipratropium 2.5 mg-0.5 mg/3 ml Nebu Commonly known as:  Millie Miller Your last dose was: Your next dose is:    
   
   
 3 mL by Nebulization route four (4) times daily. 3 mL  
    
   
   
   
  
 aluminum & magnesium hydroxide-simethicone 400-400-40 mg/5 mL suspension Commonly known as:  MYLANTA II Your last dose was: Your next dose is: Take 30 mL by mouth every four (4) hours as needed for Indigestion. 30 mL  
    
   
   
   
  
 aspirin 81 mg chewable tablet Your last dose was: Your next dose is: Take 1 Tab by mouth daily. 81 mg  
    
   
   
   
  
 beclomethasone 80 mcg/actuation Aero Commonly known as:  QVAR Your last dose was: Your next dose is: Take 2 Puffs by inhalation two (2) times a day. 2 Puff  
    
   
   
   
  
 fluticasone-vilanterol 100-25 mcg/dose inhaler Commonly known as:  BREO ELLIPTA Your last dose was: Your next dose is: Take 1 Puff by inhalation daily. 1 Puff  
    
   
   
   
  
 folic acid 1 mg tablet Commonly known as:  [employer] Your last dose was: Your next dose is: Take 1 Tab by mouth daily. 1 mg  
    
   
   
   
  
 gabapentin 100 mg capsule Commonly known as:  NEURONTIN Your last dose was: Your next dose is: Take 2 Caps by mouth two (2) times a day. Indications: nerve damage 200 mg  
    
   
   
   
  
 guaiFENesin  mg ER tablet Commonly known as:  Vladimir & Vladimir Your last dose was: Your next dose is: Take 1 Tab by mouth every twelve (12) hours. 600 mg  
    
   
   
   
  
 lidocaine 5 % Commonly known as:  Luz Ahmadi Your last dose was: Your next dose is:    
   
   
 Apply patch to the affected area for 12 hours a day and remove for 12 hours a day. lisinopril 10 mg tablet Commonly known as:  Therselvin Fishers Your last dose was: Your next dose is: Take 1 Tab by mouth daily. 10 mg  
    
   
   
   
  
 nicotine 7 mg/24 hr  
Commonly known as:  Delaney Veloz Your last dose was: Your next dose is:    
   
   
 1 Patch by TransDERmal route daily for 30 days. 1 Patch  
    
   
   
   
  
 pantoprazole 40 mg tablet Commonly known as:  PROTONIX Your last dose was: Your next dose is:    
   
   
      
   
   
   
  
 * predniSONE 5 mg tablet Commonly known as:  Kitty Jg Your last dose was: Your next dose is: Take  by mouth. * predniSONE 10 mg dose pack Commonly known as:  STERAPRED DS Your last dose was: Your next dose is:    
   
   
 See administration instruction per 10mg dose pack SPIRIVA WITH HANDIHALER 18 mcg inhalation capsule Generic drug:  tiotropium Your last dose was: Your next dose is:    
   
   
 INHALE CONTENTS OF 1 CAPSULE THROUGH HANDIHALER DEVICE DAILY therapeutic multivitamin tablet Commonly known as:  [facility] Your last dose was: Your next dose is: Take 1 Tab by mouth daily. 1 Tab  
    
   
   
   
  
 thiamine 100 mg tablet Commonly known as:  B-1 Your last dose was: Your next dose is: Take 1 Tab by mouth daily.   
 100 mg  
    
   
   
   
  
 VITAMIN D2 PO Your last dose was: Your next dose is: Take 1.25 mg by mouth. Once per week 1.25 mg  
    
   
   
   
  
 * Notice: This list has 4 medication(s) that are the same as other medications prescribed for you. Read the directions carefully, and ask your doctor or other care provider to review them with you. Where to Get Your Medications Information on where to get these meds will be given to you by the nurse or doctor. ! Ask your nurse or doctor about these medications  
  albuterol-ipratropium 2.5 mg-0.5 mg/3 ml Nebu  
 aluminum & magnesium hydroxide-simethicone 400-400-40 mg/5 mL suspension  
 aspirin 81 mg chewable tablet  
 fluticasone-vilanterol 100-25 mcg/dose inhaler  
 folic acid 1 mg tablet  
 guaiFENesin  mg ER tablet  
 lidocaine 5 %  
 nicotine 7 mg/24 hr  
 predniSONE 10 mg dose pack  
 therapeutic multivitamin tablet  
 thiamine 100 mg tablet Discharge Instructions PATIENT DISCHARGE INSTRUCTIONS PATIENT DISCHARGE INSTRUCTIONS [facility] / [id number] : 1955 Admitted 2017 Discharged: 2017 · It is important that you take the medication exactly as they are prescribed. · Keep your medication in the bottles provided by the pharmacist and keep a list of the medication names, dosages, and times to be taken in your wallet. · Do not take other medications without consulting your doctor. What to do at [facility] Recommended Diet: Regular Diet Recommended Activity: PT/OT Eval and Treat Signed By: Sonia Forbes MD   
 2017 Discharge Instructions Attachments/References COPD: BREATHING TECHNIQUES (ENGLISH) Discharge Orders None [facility] Announcement We are excited to announce that we are making your provider's discharge notes available to you in [employer].   You will see these notes when they are completed and signed by the physician that discharged you from your recent hospital stay. If you have any questions or concerns about any information you see in [employer], please call the Health Information Department where you were seen or reach out to your Primary Care Provider for more information about your plan of care. Introducing [facility]! Dear El Yoanalina: Thank you for requesting a [employer] account. Our records indicate that you already have an active [employer] account. You can access your account anytime at [email or web address] Did you know that you can access your hospital and ER discharge instructions at any time in [employer]? You can also review all of your test results from your hospital stay or ER visit. Additional Information If you have questions, please visit the Frequently Asked Questions section of the [employer] website at [email or web address] Remember, [employer] is NOT to be used for urgent needs. For medical emergencies, dial 911. Now available from your iPhone and Android! General Information Please provide this summary of care documentation to your next provider. Patient Signature:  ____________________________________________________________ Date:  ____________________________________________________________  
  
Parviz Munoz Provider Signature:  ____________________________________________________________ Date:  ____________________________________________________________

## 2017-11-05 NOTE — ED PROVIDER NOTES
[facility].  EMERGENCY DEPARTMENT HISTORY AND PHYSICAL EXAM       Date of Service: 11/5/2017   Patient Name: Rancho Turner   YOB: 1955  Medical Record Number: [id number]    History of Presenting Illness     Chief Complaint   Patient presents with    Gait Problem     Pt presents to ED for issues with standing where he feels weak with standing and can't hold himself up with his legs x days, had issues previously with dizziness with standing the dizziness has gone but he continues to have issues standing     Fatigue        History Provided By:  patient and spouse    Additional History:   Rancho Turner is a 63 y.o. male with PMhx significant for lung CA, COPD, asthma, who presents by personal vehicle to the ED with cc of weakness and increased SOB. Pt reports ambulating with his walker today when his legs suddenly gave out on him. He was unable to stand up on his own and had to call for his daughter to help. Pt had a similar episode yesterday and 3 weeks ago. Pt also reports increased SOB over the past 5-6 weeks from his baseline, which he states is from his lung CA, COPD and asthma. He was started on Opdivo ~1 month ago by Dr. Daiana Archer, oncology. Pt is not on home O2. He uses a home nebulizer TID. Pt also notes recently starting Tylenol Arthiritis for radicular back pain. He continues to smoke ~5-6 cigarettes per day. He denies fever, chills, CP. Social Hx: Endorses Tobacco, 5-6 cigarettes per day, Endorses EtOH, Denies Illicit Drugs    There are no other complaints, changes or physical findings at this time.     Primary Care Provider: Frederick Cleaning MD   Specialist: Oncology, Dr. Kyle Aceves; Pulmonology, Dr. Princess Marquez    Past History     Past Medical History:   Past Medical History:   Diagnosis Date    Arthritis     Asthma 5/21/2010    B12 deficiency 2/19/2016    Cancer ([facility]     Lung    COPD (chronic obstructive pulmonary disease) ([facility] 5/21/2010    asthma, uses inhalers    Hyperlipidemia 5/21/2010    Hypertension 5/21/2010    Other ill-defined conditions(799.89) 10/31/13    CURRENTLY BEING TRX FOR COLD    Other ill-defined conditions(799.89)     AVASCULAR NECROSIS OF HIPS/ bilateral hip replacement         Past Surgical History:   Past Surgical History:   Procedure Laterality Date    HX GI      COLONOSCOPY    HX HEENT      WISDOM TEETH    HX HIP REPLACEMENT Right 2014    Dr. Dane De La Garza  11/2014    L hip    HX LUMBAR LAMINECTOMY  1990s    HX ORTHOPAEDIC  25 years ago    lumbar laminectomy    HX ORTHOPAEDIC      left hip replacement    HX OTHER SURGICAL      egd    HX OTHER SURGICAL      bronchoscopy with biopsy    TOTAL HIP ARTHROPLASTY Right 11/2013        Family History:   Family History   Problem Relation Age of Onset    Diabetes Father      father    Lung Disease Mother      COPD    Hypertension Mother     Obesity Sister     Anesth Problems Neg Hx         Social History:   Social History   Substance Use Topics    Smoking status: Current Every Day Smoker     Packs/day: 0.25     Years: 30.00     Types: Cigarettes    Smokeless tobacco: Never Used    Alcohol use 12.0 oz/week     24 Cans of beer per week      Comment: per week        Allergies: Allergies   Allergen Reactions    Amoxicillin Other (comments)     Headache    Hydrochlorothiazide Other (comments)     Hyponatremia         Review of Systems   Review of Systems   Constitutional: Negative for activity change, chills and fever. HENT: Negative for congestion and sore throat. Eyes: Negative for pain and redness. Respiratory: Positive for shortness of breath (increased from baseline). Negative for cough and chest tightness. Cardiovascular: Negative for chest pain and palpitations. Gastrointestinal: Negative for abdominal pain, diarrhea, nausea and vomiting. Genitourinary: Negative for dysuria, frequency and urgency.    Musculoskeletal: Negative for back pain and neck pain. Skin: Negative for rash. Neurological: Positive for weakness (bilateral legs). Negative for syncope, light-headedness and headaches. Psychiatric/Behavioral: Negative for confusion. All other systems reviewed and are negative. Physical Exam  Physical Exam   Constitutional: He is oriented to person, place, and time. He appears well-developed and well-nourished. No distress. HENT:   Head: Normocephalic. Nose: Nose normal.   Mouth/Throat: Oropharynx is clear and moist. No oropharyngeal exudate. Eyes: Conjunctivae are normal. Pupils are equal, round, and reactive to light. No scleral icterus. Neck: Normal range of motion. Neck supple. No JVD present. No tracheal deviation present. No thyromegaly present. Cardiovascular: Regular rhythm and intact distal pulses. Tachycardia present. Exam reveals no gallop and no friction rub. No murmur heard. Pulmonary/Chest: No stridor. Tachypnea noted. No respiratory distress. He has wheezes (bilateral expiratory). He has no rales. Good air movement, pursed lip breathing, mild increased work of breathing   Abdominal: Soft. Bowel sounds are normal. He exhibits no distension. There is no tenderness. There is no rebound and no guarding. Musculoskeletal: Normal range of motion. He exhibits no edema. Lymphadenopathy:     He has no cervical adenopathy. Neurological: He is alert and oriented to person, place, and time. No cranial nerve deficit. He exhibits normal muscle tone. Coordination normal.   Skin: Skin is warm and dry. No rash noted. He is not diaphoretic. No erythema. Psychiatric: He has a normal mood and affect. His behavior is normal.   Nursing note and vitals reviewed. Medical Decision Making   I am the first provider for this patient. I reviewed the vital signs, available nursing notes, past medical history, past surgical history, family history and social history. Old Medical Records: Old medical records. Provider Notes:   COPD exacerbation, metabolic abnormality, CVA    ED Course:  3:08 PM   Initial assessment performed. The patients presenting problems have been discussed, and they are in agreement with the care plan formulated and outlined with them. I have encouraged them to ask questions as they arise throughout their visit. Progress Notes:   5:28 PM  Code purple cancelled as pt is dehydrated with hyponatremia. Written by Sara Whitley ED Scribe as dictated by Brittany Restrepo MD    CONSULT NOTE:   5:47 PM  Brittany Restrepo MD spoke with Dr. Josemanuel Hood,   Specialty: Hospitalist  Discussed pt's hx, disposition, and available diagnostic and imaging results. Reviewed care plans. Consultant will evaluate pt for admission. Written by Sara Whitley, ED Scribe, as dictated by Brittany Restrepo MD.    Diagnostic Study Results     Labs -      Recent Results (from the past 12 hour(s))   CBC WITH AUTOMATED DIFF    Collection Time: 11/05/17  3:09 PM   Result Value Ref Range    WBC 9.0 4.1 - 11.1 K/uL    RBC 4.41 4.10 - 5.70 M/uL    HGB 14.7 12.1 - 17.0 g/dL    HCT 42.1 36.6 - 50.3 %    MCV 95.5 80.0 - 99.0 FL    MCH 33.3 26.0 - 34.0 PG    MCHC 34.9 30.0 - 36.5 g/dL    RDW 13.4 11.5 - 14.5 %    PLATELET 334 897 - 131 K/uL    NEUTROPHILS 75 32 - 75 %    LYMPHOCYTES 17 12 - 49 %    MONOCYTES 7 5 - 13 %    EOSINOPHILS 1 0 - 7 %    BASOPHILS 0 0 - 1 %    ABS. NEUTROPHILS 6.7 1.8 - 8.0 K/UL    ABS. LYMPHOCYTES 1.5 0.8 - 3.5 K/UL    ABS. MONOCYTES 0.6 0.0 - 1.0 K/UL    ABS. EOSINOPHILS 0.1 0.0 - 0.4 K/UL    ABS.  BASOPHILS 0.0 0.0 - 0.1 K/UL   METABOLIC PANEL, COMPREHENSIVE    Collection Time: 11/05/17  3:09 PM   Result Value Ref Range    Sodium 131 (L) 136 - 145 mmol/L    Potassium 4.4 3.5 - 5.1 mmol/L    Chloride 94 (L) 97 - 108 mmol/L    CO2 27 21 - 32 mmol/L    Anion gap 10 5 - 15 mmol/L    Glucose 73 65 - 100 mg/dL    BUN 10 6 - 20 MG/DL    Creatinine 1.12 0.70 - 1.30 MG/DL    BUN/Creatinine ratio 9 (L) 12 - 20 GFR est AA >60 >60 ml/min/1.73m2    GFR est non-AA >60 >60 ml/min/1.73m2    Calcium 9.3 8.5 - 10.1 MG/DL    Bilirubin, total 0.5 0.2 - 1.0 MG/DL    ALT (SGPT) 34 12 - 78 U/L    AST (SGOT) 28 15 - 37 U/L    Alk. phosphatase 66 45 - 117 U/L    Protein, total 7.8 6.4 - 8.2 g/dL    Albumin 3.6 3.5 - 5.0 g/dL    Globulin 4.2 (H) 2.0 - 4.0 g/dL    A-G Ratio 0.9 (L) 1.1 - 2.2     CK W/ CKMB & INDEX    Collection Time: 11/05/17  3:09 PM   Result Value Ref Range    CK 75 39 - 308 U/L    CK - MB 2.4 <3.6 NG/ML    CK-MB Index 3.2 (H) 0 - 2.5     TROPONIN I    Collection Time: 11/05/17  3:09 PM   Result Value Ref Range    Troponin-I, Qt. <0.04 <0.05 ng/mL   LACTIC ACID    Collection Time: 11/05/17  3:09 PM   Result Value Ref Range    Lactic acid 3.9 (HH) 0.4 - 2.0 MMOL/L   EKG, 12 LEAD, INITIAL    Collection Time: 11/05/17  3:35 PM   Result Value Ref Range    Ventricular Rate 101 BPM    Atrial Rate 101 BPM    P-R Interval 126 ms    QRS Duration 94 ms    Q-T Interval 346 ms    QTC Calculation (Bezet) 448 ms    Calculated P Axis 76 degrees    Calculated R Axis 99 degrees    Calculated T Axis 52 degrees    Diagnosis       Sinus tachycardia  Right atrial enlargement  Rightward axis  Possible Inferior infarct , age undetermined  Cannot rule out Anterior infarct , age undetermined  Abnormal ECG  When compared with ECG of 09-OCT-2017 13:17,  premature atrial complexes are no longer present     BLOOD GAS, ARTERIAL    Collection Time: 11/05/17  6:45 PM   Result Value Ref Range    pH 7.41 7.35 - 7.45      PCO2 37 35.0 - 45.0 mmHg    PO2 86 80 - 100 mmHg    O2 SAT 97 92 - 97 %    BICARBONATE 23 22 - 26 mmol/L    BASE DEFICIT 1.4 mmol/L    O2 METHOD ROOM AIR      Sample source ARTERIAL      SITE LEFT RADIAL      ADITI'S TEST YES         Radiologic Studies -  The following have been ordered and reviewed:  CT Results  (Last 48 hours)               11/05/17 1642  CTA CHEST W OR W WO CONT Final result    Impression:  IMPRESSION:    1.  Mildly enlarged left hilar lymph node. 2. Left lower lobe bullous emphysema associated with scarring. Narrative:  CT ANGIOGRAPHY CHEST. 11/5/2017 4:42 PM        INDICATION: Lung carcinoma, worsening dyspnea. COMPARISON: 10/9/2017, 1/29/2016. TECHNIQUE: CT angiography of the chest was performed after the administration of   100 cc IV contrast (Isovue 370). Coronal and sagittal, and coronal MIP   reconstructions were performed. CT dose reduction was achieved through use of a   standardized protocol tailored for this examination and automatic exposure   control for dose modulation. FINDINGS:   Bullous emphysema in the left lower lobe causes chronic atelectasis/scarring   superior to it. There is bullous paraseptal emphysema along the anterior   junction line bilaterally in the upper lobes. There is a wedge resection staple   line along the major fissure in the right lung. A mildly enlarged left hilar   lymph node measures 15 mm in short axis. There is chronic left pleural   thickening with minimal calcification. There is no pulmonary embolism. The heart   size is normal. Three-vessel coronary artery calcifications are moderate. A   right IJ ported catheter terminates in the right atrium. The visualized upper   abdomen is normal.           11/05/17 1522  CT HEAD WO CONT Final result    Impression:  IMPRESSION: No change. No acute abnormality identified. Narrative:  EXAM:  CT HEAD WO CONT       INDICATION:   leg weakness       COMPARISON: 2017. CONTRAST:  None. TECHNIQUE: Unenhanced CT of the head was performed using 5 mm images. Brain and   bone windows were generated. CT dose reduction was achieved through use of a   standardized protocol tailored for this examination and automatic exposure   control for dose modulation. FINDINGS:   The ventricles and sulci are normal in size, shape and configuration and   midline. There is no significant white matter disease.  There is no intracranial   hemorrhage, extra-axial collection, mass, mass effect or midline shift. The   basilar cisterns are open. No acute infarct is identified. The bone windows   demonstrate no abnormalities. The visualized portions of the paranasal sinuses   and mastoid air cells are clear. Small meningioma noted on MR dated 2/9/2017 is   better visualized on the MR than on CT               CXR Results  (Last 48 hours)               11/05/17 1559  XR CHEST PORT Final result    Impression:  IMPRESSION:    No acute disease in the chest. Emphysema. Narrative:  PORTABLE CHEST RADIOGRAPH/S: 11/5/2017 3:59 PM       INDICATION: Chest pain. Weakness walking last week, with legs giving out,   dizziness, fatigue. COMPARISON: 10/9/2017, 7/11/2017, 10/21/2016. CT chest 10/9/2017. TECHNIQUE: Portable frontal semiupright radiograph/s of the chest.       FINDINGS:    Bullous emphysema in the left lower lobe causes chronic atelectasis/scarring   superior to it. There is left paratracheal paraseptal emphysema as well. No   superimposed acute airspace consolidation. Wedge resection staple line in the   right midlung. A right IJ ported catheter terminates in the right atrium. No   pneumothorax and no large pleural effusion. Chronic blunting of the left   costophrenic angle. Vital Signs-Reviewed the patient's vital signs.    Patient Vitals for the past 12 hrs:   Temp Pulse Resp BP SpO2   11/05/17 1830 - 79 (!) 36 150/77 100 %   11/05/17 1730 - 83 29 144/76 99 %   11/05/17 1630 - 80 (!) 35 145/85 99 %   11/05/17 1550 - - - 146/74 -   11/05/17 1530 - (!) 112 30 148/87 97 %   11/05/17 1455 98.4 °F (36.9 °C) (!) 125 (!) 34 145/89 99 %       Medications Given in the ED:  Medications   sodium chloride (NS) flush 5-10 mL (not administered)   methylPREDNISolone (PF) (SOLU-MEDROL) injection 40 mg (not administered)   albuterol (PROVENTIL VENTOLIN) nebulizer solution 5 mg (not administered)   gabapentin (NEURONTIN) capsule 200 mg (200 mg Oral Given 11/5/17 1852)   lisinopril (PRINIVIL, ZESTRIL) tablet 10 mg (not administered)   umeclidinium (INCRUSE ELLIPTA) 62.5 mcg/actuation (not administered)   budesonide (PULMICORT) 500 mcg/2 ml nebulizer suspension (not administered)   albuterol (PROVENTIL VENTOLIN) nebulizer solution 2.5 mg (not administered)   nicotine (NICODERM CQ) 7 mg/24 hr patch 1 Patch (1 Patch TransDERmal Apply Patch 11/5/17 1852)   sodium chloride (NS) flush 5-10 mL (not administered)   sodium chloride (NS) flush 5-10 mL (not administered)   acetaminophen (TYLENOL) tablet 650 mg (not administered)   ondansetron (ZOFRAN) injection 4 mg (not administered)   enoxaparin (LOVENOX) injection 40 mg (40 mg SubCUTAneous Given 11/5/17 1852)   0.9% sodium chloride infusion (not administered)   aspirin chewable tablet 81 mg (not administered)   klack's mouthwash oral suspension (COMPOUNDED) (not administered)   diazePAM (VALIUM) tablet 5 mg (not administered)   diazePAM (VALIUM) tablet 10 mg (not administered)   folic acid (FOLVITE) tablet 1 mg (not administered)   thiamine (B-1) tablet 100 mg (not administered)   multivitamin, tx-iron-ca-min (THERA-M w/ IRON) tablet 1 Tab (not administered)   sodium chloride 0.9 % bolus infusion 2,313 mL (0 mL/kg × 77.1 kg IntraVENous IV Completed 11/5/17 1724)   0.9% sodium chloride infusion (0 mL/hr IntraVENous IV Completed 11/5/17 1724)   iopamidol (ISOVUE-370) 76 % injection 100 mL (100 mL IntraVENous Given 11/5/17 1642)   sodium chloride (NS) flush 10 mL (10 mL IntraVENous Given 11/5/17 1643)   methylPREDNISolone (PF) (SOLU-MEDROL) injection 125 mg (125 mg IntraVENous Given 11/5/17 1751)   albuterol-ipratropium (DUO-NEB) 2.5 MG-0.5 MG/3 ML (3 mL Nebulization Given 11/5/17 1751)       Pulse Oximetry Analysis - Normal 99% on RA     Cardiac Monitor:   Rate: 125  Rhythm: Sinus Tachycardia     ED EKG interpretation: 15:35  Rhythm: sinus tachycardia; and regular .  Rate (approx.): 101; Axis: right axis deviation; MN Interval: normal; QRS interval: normal ; ST/T wave: non-specific changes; This EKG was interpreted by Reid Rosen MD,ED Provider. Will admit to hospitalist; tachycardia resolved with iv fluids; labs only remarkable for elevated lactate and hyponatremia; negative troponin; cta chest negative for pe or acute process; ct head without acute abnormality; Reid Rosen MD        Diagnosis   Clinical Impression:   1. Hyponatremia    2. Acute exacerbation of chronic obstructive pulmonary disease (COPD) ([address]    3. SIRS (systemic inflammatory response syndrome) ([facility])         Plan:    Disposition Note:  PLAN: Admit to Hospitalist    5:48 PM  Patient is being admitted to the hospital by Dr. Alexia Moralez. The results of their tests and reasons for their admission have been discussed with them and/or available family. They convey agreement and understanding for the need to be admitted and for their admission diagnosis. Written by Armando Samuels, ED Scribe, as dictated by Reid Rosen MD.    _______________________________   Attestations:     Attestation: This note is prepared by Gurmeet Templeton, acting as Scribe for MD Reid Bedolla MD: The scribe's documentation has been prepared under my direction and personally reviewed by me in its entirety.  I confirm that the note above accurately reflects all work, treatment, procedures, and medical decision making performed by me.  _______________________________

## 2017-11-06 NOTE — ROUTINE PROCESS
Oncology Nursing Communication Tool  8:23 AM  11/6/2017     Bedside shift change report given to Ilsa Frederick RN (incoming nurse) by Keshav Franklin RN (outgoing nurse) on Leda Shane. Report included the following information SBAR, Kardex, Intake/Output, MAR and Recent Results. Significant changes during shift: lactic acid down to 1.9 on last draw. SR on telemetry. Still needs MRI & 2Decho today & has pulmonary consult to be called. Issues for physician to address: none         Oncology Shift Note   Admission Date 11/5/2017   Admission Diagnosis COPD exacerbation ([facility]  Acute respiratory distress   Code Status Partial Code   Consults IP CONSULT TO PULMONOLOGY      Cardiac Monitoring [x] Yes [] No      Purposeful Hourly Rounding [x] Yes    Robert Score Total Score: 4   Robert score 3 or > [x] Bed Alarm [] Avasys [] 1:1 sitter [] Patient refused (Place signed refusal form in chart)      Pain Managed [x] Yes [] No    Key Pain Meds             aspirin (ASPIRIN) 325 mg tablet  (Taking) Take 81 mg by mouth daily. Influenza Vaccine Received Flu Vaccine for Current Season (usually Sept-March): Yes (august 2017)           Oxygen needs? [x] Room air Oxygen @  []1L    []2L    []3L   []4L    []5L   []6L     Use home O2? [] Yes [x] No  Perform O2 challenge test using  smartphrase (.Homeoxygen)      Last bowel movement Last Bowel Movement Date: 11/05/17      Urinary Catheter             LDAs             Venous Access Device right chest [facility] 11/05/17 Upper chest (subclavicular area, right (Active)   Central Line Being Utilized Yes 11/6/2017  7:41 AM   Criteria for Appropriate Use Irritant/vesicant medication 11/6/2017  7:41 AM   Site Assessment Clean, dry, & intact 11/6/2017  7:41 AM   Date of Last Dressing Change 11/05/17 11/6/2017  7:41 AM   Dressing Status Clean, dry, & intact 11/6/2017  7:41 AM   Dressing Type Disk with Chlorhexadine gluconate (CHG); Transparent 11/6/2017  7:41 AM   Action Taken Blood drawn 11/5/2017 10:59 PM   Date Accessed (Medial Site) 11/05/17 11/5/2017 10:59 PM   Access Time (Medial Site) 2255 11/5/2017 10:59 PM   Access Needle Size (Site #1) 20 G 11/5/2017 10:59 PM   Access Needle Length (Medial Site) 1 inch 11/5/2017 10:59 PM   Positive Blood Return (Medial Site) Yes 11/5/2017 10:59 PM   Action Taken (Medial Site) Infusing 11/6/2017  3:03 AM      Peripheral IV 11/05/17 Left Antecubital (Active)   Site Assessment Clean, dry, & intact 11/6/2017  7:41 AM   Phlebitis Assessment 0 11/6/2017  7:41 AM   Infiltration Assessment 0 11/6/2017  7:41 AM   Dressing Status Clean, dry, & intact 11/6/2017  7:41 AM   Dressing Type Tape;Transparent 11/6/2017  7:41 AM   Hub Color/Line Status Pink;Flushed 11/6/2017  7:41 AM   Alcohol Cap Used Yes 11/5/2017  3:16 PM                         Readmission Risk Assessment Tool Score Low Risk            8       Total Score        3 Has Seen PCP in Last 6 Months (Yes=3, No=0)    2 . Living with Significant Other. Assisted Living. LTAC. SNF. or   Rehab    3 Charlson Comorbidity Score (Age + Comorbid Conditions)        Criteria that do not apply:    Patient Length of Stay (>5 days = 3)    IP Visits Last 12 Months (1-3=4, 4=9, >4=11)    Pt. Coverage (Medicare=5 , Medicaid, or Self-Pay=4)       Expected Length of Stay - - -   Actual Length of Stay 1          Opportunity for questions and clarifications were given to the incoming nurse. Patient's bed is in low position, side rails x2, door open PRN, call bell within reach and patient not in distress.       Facundo Carvajal RN

## 2017-11-06 NOTE — PROGRESS NOTES
S: Mr. Miranda Cristobal was seen by me today during rounds. At this time, he is resting in bed. He is still SOB. Also complains of hearing loss, and is shouting. He has had trouble ambulating -- \"Legs just go right out from under me. \"   ROS otherwise unremarkable except as noted elsewhere. O: Blood pressure 131/63, pulse 76, temperature 98.1 °F (36.7 °C), resp. rate 20, height 6' (1.829 m), weight 170 lb (77.1 kg), SpO2 99 %. Gen: Patient is somewhat tachypnic. Lungs: Exp wheezing noted. Heart: RRR. Abd: S, NT, ND, BS present. Extremities: Warm. Recent Results (from the past 12 hour(s))   TROPONIN I    Collection Time: 11/05/17 10:56 PM   Result Value Ref Range    Troponin-I, Qt. <0.04 <0.05 ng/mL   LACTIC ACID    Collection Time: 11/05/17 10:56 PM   Result Value Ref Range    Lactic acid 1.4 0.4 - 2.0 MMOL/L   TROPONIN I    Collection Time: 11/06/17  5:27 AM   Result Value Ref Range    Troponin-I, Qt. <0.04 <8.92 ng/mL   METABOLIC PANEL, COMPREHENSIVE    Collection Time: 11/06/17  5:27 AM   Result Value Ref Range    Sodium 134 (L) 136 - 145 mmol/L    Potassium 3.8 3.5 - 5.1 mmol/L    Chloride 102 97 - 108 mmol/L    CO2 22 21 - 32 mmol/L    Anion gap 10 5 - 15 mmol/L    Glucose 233 (H) 65 - 100 mg/dL    BUN 14 6 - 20 MG/DL    Creatinine 1.05 0.70 - 1.30 MG/DL    BUN/Creatinine ratio 13 12 - 20      GFR est AA >60 >60 ml/min/1.73m2    GFR est non-AA >60 >60 ml/min/1.73m2    Calcium 7.9 (L) 8.5 - 10.1 MG/DL    Bilirubin, total 0.3 0.2 - 1.0 MG/DL    ALT (SGPT) 25 12 - 78 U/L    AST (SGOT) 17 15 - 37 U/L    Alk.  phosphatase 53 45 - 117 U/L    Protein, total 5.9 (L) 6.4 - 8.2 g/dL    Albumin 2.6 (L) 3.5 - 5.0 g/dL    Globulin 3.3 2.0 - 4.0 g/dL    A-G Ratio 0.8 (L) 1.1 - 2.2     CBC WITH AUTOMATED DIFF    Collection Time: 11/06/17  5:27 AM   Result Value Ref Range    WBC 5.0 4.1 - 11.1 K/uL    RBC 3.60 (L) 4.10 - 5.70 M/uL    HGB 12.0 (L) 12.1 - 17.0 g/dL    HCT 34.2 (L) 36.6 - 50.3 %    MCV 95.0 80.0 - 99.0 FL    MCH 33.3 26.0 - 34.0 PG    MCHC 35.1 30.0 - 36.5 g/dL    RDW 13.3 11.5 - 14.5 %    PLATELET 353 754 - 318 K/uL    NEUTROPHILS 94 (H) 32 - 75 %    LYMPHOCYTES 5 (L) 12 - 49 %    MONOCYTES 1 (L) 5 - 13 %    EOSINOPHILS 0 0 - 7 %    BASOPHILS 0 0 - 1 %    ABS. NEUTROPHILS 4.6 1.8 - 8.0 K/UL    ABS. LYMPHOCYTES 0.3 (L) 0.8 - 3.5 K/UL    ABS. MONOCYTES 0.1 0.0 - 1.0 K/UL    ABS. EOSINOPHILS 0.0 0.0 - 0.4 K/UL    ABS. BASOPHILS 0.0 0.0 - 0.1 K/UL    RBC COMMENTS NORMOCYTIC, NORMOCHROMIC      DF SMEAR SCANNED     MAGNESIUM    Collection Time: 11/06/17  5:27 AM   Result Value Ref Range    Magnesium 1.7 1.6 - 2.4 mg/dL   PHOSPHORUS    Collection Time: 11/06/17  5:27 AM   Result Value Ref Range    Phosphorus 2.8 2.6 - 4.7 MG/DL        A / P:   1. Acute respiratory distress (11/5/2017): On oxygen, other measures as below. 2. COPD exacerbation ([facility] [address] (11/5/2017): Scheduled nebs, IV solumedrol. 3. Leg weakness: Neuro work up in progress. 4. SCLC, unresectable: Opdivo on hold for last few weeks on fear that it is exacerbating his COPD.   5. Elevated lactic acid. 6. Hypertension: Controlled. 7. Falls/Deconditioning: PT / OT as breathing improves. 8. ETOH: CIWA protocol. 9. Mucocitis  10.  Code: Partial.

## 2017-11-06 NOTE — ROUTINE PROCESS
Report received from [address] in the ED at 1913. Pt arrived via stretcher at [facility]. Pt's daughter & wife with him. Pt was able to scoot himself from stretcher to bed w/o assist. Pt alert & oriented x4, CIWA of 4 on initial assessment. Pt very [place]. Per wife pt fell x2 at home with abrasions/tears to right arm & leg. Pt states his legs just gave out. Wife states 15-20 falls in last year. Dual skin assessment done with [facility] [place]. Pt has mult scars. Mult bruises on arms & a few on legs. Pt has skin tear to top of Right hand & right upper arm. He has abrasions to right knee, ankle, top of foot. Bed alarm on bed. Will monitor & med per CIWA scale. Side rails up x3 & CBIR, urinal w/in reach. Pt oriented to call bell & bed controls.

## 2017-11-06 NOTE — PROGRESS NOTES

## 2017-11-06 NOTE — ROUTINE PROCESS
TRANSFER - OUT REPORT:    Verbal report given to Dilia Albert on Amparo Mitchell  being transferred to [id number] [phone] (Oncology) for routine progression of care       Report consisted of patients Situation, Background, Assessment and   Recommendations(SBAR). Information from the following report(s) SBAR, ED Summary, Procedure Summary, Intake/Output, MAR and Recent Results was reviewed with the receiving nurse. Lines:   Peripheral IV 11/05/17 Left Antecubital (Active)   Site Assessment Clean, dry, & intact 11/5/2017  3:16 PM   Phlebitis Assessment 0 11/5/2017  3:16 PM   Infiltration Assessment 0 11/5/2017  3:16 PM   Dressing Status Clean, dry, & intact 11/5/2017  3:16 PM   Dressing Type 4 X 4;Transparent 11/5/2017  3:16 PM   Hub Color/Line Status Pink;Flushed 11/5/2017  3:16 PM   Alcohol Cap Used Yes 11/5/2017  3:16 PM        Opportunity for questions and clarification was provided.       Patient transported with:   [facility]

## 2017-11-06 NOTE — PROGRESS NOTES
Oncology Interdisciplinary rounds were held today to discuss patient plan of care and outcomes. The following members were present: Nursing and Case Management.     Plan of Care:  MRI, PT/OT consulted, ECHO pending, Nebs, CIWA protocol, cardiac monitoring    Discharge Disposition: awaiting PT/OT recommendations

## 2017-11-06 NOTE — PROGRESS NOTES
Physical Therapy Screening:  Services are indicated and therapy order is required. When appropriate (pulmonary note states as breathing improves)    An InBasket screening referral was triggered for physical therapy based on results obtained during the nursing admission assessment. The patients chart was reviewed and the patient is appropriate for a skilled therapy evaluation. Please order a consult for physical therapy if you are in agreement and would like an evaluation to be completed. Thank you.     Katherine Van, PT

## 2017-11-06 NOTE — CONSULTS
[facility] Pulmonary Consult Service Note  Pulmonary, Critical Care, and Sleep Medicine    Name: Nalini Patel MRN: [id number]   : 1955 Hospital: [address] [facility]   Date: 2017   Hospital Day: 2       Subjective/Interval History:   I have reviewed the flowsheet and previous days notes. Seen earlier today on rounds. IMPRESSION:   1. COPD exacerbation with severe inspiratory and expiratory wheezing. Bullous emphysema -he has poor ventilatory reserve and his quality of life is severely affected by the severity of COPD. He will continue to require treatments at home and his potential for re-admission is very high  2. Tobacco use- active  3. Squamous cell lung cancer, unresectable: Opdivo on hold for last few weeks on fear that it is exacerbating his COPD. Overall good response  4. Left hilar LN   5. Leg weakness: Neuro work up in progress. 6. Hypertension: Controlled. 7. Falls/Deconditioning: PT / OT as breathing improves. 8. ETOH: CIWA protocol. 9. Mucositis      RECOMMENDATIONS/PLAN:   1. Agree with Solumedrol  2. scheduled Nebs  3. TTE and cardiac Biomarkers for completeness  4. no indication for antibiotics at this time  5. Influenza screen  6. Scheduled nebs, IV solumedrol. Code: Partial Code      Risk of deterioration: medium   [x] High complexity decision making was performed    Subjective/Initial History:   I have reviewed the flowsheet and previous days notes. I was asked by Adama Martinez MD to see Nalini Patel in consultation for a chief complaint of  COPD, SOB and wheezes. Nalini Patel is a 63 y.o. male with PMhx significant for lung CA, COPD, asthma, who presented by personal vehicle to the ED with cc of weakness and increased SOB. Pt was ambulating with his walker when his legs suddenly gave out on him. He was unable to stand up on his own and had to call for his daughter to help.  Pt had a similar episode the other day and 3 weeks ago. Pt had increased SOB over the past 5-6 weeks from his baseline, which he states is from his lung CA, COPD and asthma. He was started on Opdivo ~1 month ago by Dr. May Chao, oncology. Pt is not on home O2. He uses a home nebulizer TID. Pt also notes recently starting Tylenol Arthiritis for radicular back pain. He continues to smoke ~5-6 cigarettes per day and drinks. He denies fever, chills, CP.    CTA negative for PE. Marked wheezing and no congestion. No fever. Allergies   Allergen Reactions    Amoxicillin Other (comments)     Headache    Hydrochlorothiazide Other (comments)     Hyponatremia        MAR reviewed and pertinent medications noted or modified as needed     Current Facility-Administered Medications   Medication    sodium chloride (NS) flush 5-10 mL    methylPREDNISolone (PF) (SOLU-MEDROL) injection 40 mg    albuterol (PROVENTIL VENTOLIN) nebulizer solution 5 mg    gabapentin (NEURONTIN) capsule 200 mg    lisinopril (PRINIVIL, ZESTRIL) tablet 10 mg    umeclidinium (INCRUSE ELLIPTA) 62.5 mcg/actuation    budesonide (PULMICORT) 500 mcg/2 ml nebulizer suspension    albuterol (PROVENTIL VENTOLIN) nebulizer solution 2.5 mg    nicotine (NICODERM CQ) 7 mg/24 hr patch 1 Patch    sodium chloride (NS) flush 5-10 mL    sodium chloride (NS) flush 5-10 mL    acetaminophen (TYLENOL) tablet 650 mg    ondansetron (ZOFRAN) injection 4 mg    enoxaparin (LOVENOX) injection 40 mg    0.9% sodium chloride infusion    aspirin chewable tablet 81 mg    klack's mouthwash oral suspension (COMPOUNDED)    diazePAM (VALIUM) tablet 5 mg    diazePAM (VALIUM) tablet 10 mg    folic acid (FOLVITE) tablet 1 mg    thiamine (B-1) tablet 100 mg    therapeutic multivitamin (THERAGRAN) tablet 1 Tab        PMH:  has a past medical history of Arthritis; Asthma (5/21/2010); B12 deficiency (2/19/2016); Cancer [facility]); COPD (chronic obstructive pulmonary disease) ([facility] [address] (5/21/2010);  Hyperlipidemia (5/21/2010); Hypertension (2010); Other ill-defined conditions(799.89) (10/31/13); and Other ill-defined conditions(799.89). He also has no past medical history of Adverse effect of anesthesia; Difficult intubation; Malignant hyperthermia due to anesthesia; Nausea & vomiting; or Pseudocholinesterase deficiency. PSH:   has a past surgical history that includes lumbar laminectomy (); heent; gi; hip replacement (Right, ); hip replacement (2014); other surgical; orthopaedic (25 years ago); total hip arthroplasty (Right, 2013); orthopaedic; and other surgical.   FHX: family history includes Diabetes in his father; Hypertension in his mother; Lung Disease in his mother; Obesity in his sister. There is no history of Anesth Problems. SHX:  reports that he has been smoking Cigarettes. He has a 7.50 pack-year smoking history. He has never used smokeless tobacco. He reports that he drinks about 12.0 oz of alcohol per week  He reports that he does not use illicit drugs. ROS:A comprehensive review of systems was negative except for that written in the HPI. Objective:     Vital Signs: Intake/Output: Intake/Output:   Visit Vitals    /62 (BP 1 Location: Left arm, BP Patient Position: At rest)    Pulse 79    Temp 98.4 °F (36.9 °C)    Resp 20    Ht 6' (1.829 m)    Wt 77.1 kg (170 lb)    SpO2 98%    BMI 23.06 kg/m2           O2 Device: Room air     Wt Readings from Last 4 Encounters:   17 77.1 kg (170 lb)   10/09/17 71.2 kg (157 lb)   17 71.5 kg (157 lb 9.6 oz)   17 73 kg (160 lb 15 oz)       Temp (24hrs), Av °F (36.7 °C), Min:97.5 °F (36.4 °C), Max:98.4 °F (36.9 °C)     Intake/Output Summary (Last 24 hours) at 17 1223  Last data filed at 17 0659   Gross per 24 hour   Intake           1262.5 ml   Output             2500 ml   Net          -1237.5 ml     Last shift:         Last 3 shifts:  1901  0700  In: 1262.5 [P.O.:400;  I.V.:862.5]  Out: 2500 [Urine:2500] Exam:    Physical Exam:    General: WDWNWM  in no respiratory distress and acyanotic, alert, oriented times 3, cooperative and moderately ill   HEENT: NCAT; No Thrush; class 4 airway   Neck: No abnormally enlarged lymph nodes. Chest: increased AP diameter   Lungs: scattered wheezes bilaterally   Heart: Regular rate and rhythm   Abdomen: soft, non-tender, without masses or organomegaly   :    Extremity: negative, cyanosis    Neuro: alert   Psych: oriented to time, place and person   Skin: Skin unremarkable;    Pulses:Bilateral, Radial, 2+   Capillary refill:  well perfused, brisk capillary refill,    Data:     Interval lab and diagnostic data was reviewed. Interval radiology images were independently viewed and available reports were reviewed. All lab results for the last 24 hours reviewed.              Labs:    Recent Labs      11/06/17 0527 11/05/17   1509   WBC  5.0  9.0   HGB  12.0*  14.7   PLT  171  226     Recent Labs      11/06/17 0527 11/05/17 2256 11/05/17 1922 11/05/17   1509   NA  134*   --    --   131*   K  3.8   --    --   4.4   CL  102   --    --   94*   CO2  22   --    --   27   GLU  233*   --    --   73   BUN  14   --    --   10   CREA  1.05   --    --   1.12   CA  7.9*   --    --   9.3   MG  1.7   --    --    --    PHOS  2.8   --    --    --    LAC   --   1.4  2.9*  3.9*   ALB  2.6*   --    --   3.6   SGOT  17   --    --   28   ALT  25   --    --   34     Recent Labs      11/05/17   1845   PH  7.41   PCO2  37   PO2  86   HCO3  23     Recent Labs      11/06/17 0527 11/05/17 2256 11/05/17   1509   CPK   --    --   75   CKNDX   --    --   3.2*   TROIQ  <0.04  <0.04  <0.04     No results found for: BNPP, BNP   Lab Results   Component Value Date/Time    Culture result: NO GROWTH AFTER 16 HOURS 11/05/2017 03:09 PM    Culture result: NO GROWTH AFTER 16 HOURS 11/05/2017 03:09 PM    Culture result: NO GROWTH 6 DAYS 06/28/2016 05:10 PM     Lab Results   Component Value Date/Time    Vancomycin,trough 18.4 07/01/2016 06:10 AM    CK 75 11/05/2017 03:09 PM     Lab Results   Component Value Date/Time    Color YELLOW/STRAW 11/05/2017 07:15 PM    Appearance CLEAR 11/05/2017 07:15 PM    pH (UA) 5.5 11/05/2017 07:15 PM    Protein NEGATIVE  11/05/2017 07:15 PM    Glucose NEGATIVE  11/05/2017 07:15 PM    Ketone NEGATIVE  11/05/2017 07:15 PM    Bilirubin NEGATIVE  11/05/2017 07:15 PM    Blood NEGATIVE  11/05/2017 07:15 PM    Urobilinogen 0.2 11/05/2017 07:15 PM    Nitrites NEGATIVE  11/05/2017 07:15 PM    Leukocyte Esterase NEGATIVE  11/05/2017 07:15 PM    WBC 0-4 11/05/2017 07:15 PM    RBC 0-5 11/05/2017 07:15 PM    Bacteria NEGATIVE  11/05/2017 07:15 PM         Imaging:  I have personally reviewed the patients radiographs and have reviewed the reports:        Results from Hospital Encounter encounter on 11/05/17   XR CHEST PORT   Narrative PORTABLE CHEST RADIOGRAPH/S: 11/5/2017 3:59 PM    INDICATION: Chest pain. Weakness walking last week, with legs giving out,  dizziness, fatigue. COMPARISON: 10/9/2017, 7/11/2017, 10/21/2016. CT chest 10/9/2017. TECHNIQUE: Portable frontal semiupright radiograph/s of the chest.    FINDINGS:   Bullous emphysema in the left lower lobe causes chronic atelectasis/scarring  superior to it. There is left paratracheal paraseptal emphysema as well. No  superimposed acute airspace consolidation. Wedge resection staple line in the  right midlung. A right IJ ported catheter terminates in the right atrium. No  pneumothorax and no large pleural effusion. Chronic blunting of the left  costophrenic angle. Impression IMPRESSION:   No acute disease in the chest. Emphysema. Results from [place] encounter on 11/05/17   CTA CHEST W OR W WO CONT   Narrative CT ANGIOGRAPHY CHEST. 11/5/2017 4:42 PM     INDICATION: Lung carcinoma, worsening dyspnea. COMPARISON: 10/9/2017, 1/29/2016.     TECHNIQUE: CT angiography of the chest was performed after the administration of  100 cc IV contrast (Isovue 370). Coronal and sagittal, and coronal MIP  reconstructions were performed. CT dose reduction was achieved through use of a  standardized protocol tailored for this examination and automatic exposure  control for dose modulation. FINDINGS:  Bullous emphysema in the left lower lobe causes chronic atelectasis/scarring  superior to it. There is bullous paraseptal emphysema along the anterior  junction line bilaterally in the upper lobes. There is a wedge resection staple  line along the major fissure in the right lung. A mildly enlarged left hilar  lymph node measures 15 mm in short axis. There is chronic left pleural  thickening with minimal calcification. There is no pulmonary embolism. The heart  size is normal. Three-vessel coronary artery calcifications are moderate. A  right IJ ported catheter terminates in the right atrium. The visualized upper  abdomen is normal.         Impression IMPRESSION:   1. Mildly enlarged left hilar lymph node. 2. Left lower lobe bullous emphysema associated with scarring. My assessment/plan was discussed with:  nursing    respiratory therapy Dr. crystal      Complex decision making was performed which includes reviewing the patient's past medical records, current laboratory results, medications, ECG and actual Xray films, immediately available at the bedside to the patient    Thank you for allowing us to participate in the care of this patient. We will be happy to follow with you.     Elfego Lainez MD

## 2017-11-06 NOTE — ROUTINE PROCESS
Bedside and Verbal shift change report given to [facility] AT [facility] (oncoming nurse) by [facility], RN  (offgoing nurse). Report included the following information SBAR, Kardex, Intake/Output and MAR.

## 2017-11-07 NOTE — PROGRESS NOTES
S: Mr. Davy Recio was seen by me today during rounds. At this time, he is resting in bed. He is still SOB. Also complains of hearing loss, and is shouting. He has had trouble ambulating -- \"Legs just go right out from under me. \"   ROS otherwise unremarkable except as noted elsewhere. O: Blood pressure 139/72, pulse 77, temperature 97.6 °F (36.4 °C), resp. rate 22, height 6' (1.829 m), weight 170 lb (77.1 kg), SpO2 100 %. Gen: Patient is somewhat tachypnic. Lungs: Exp wheezing noted. Heart: RRR. Abd: S, NT, ND, BS present. Extremities: Warm. No results found for this or any previous visit (from the past 12 hour(s)). CT Head: No change. No acute abnormality identified    CTA chest:   1. Mildly enlarged left hilar lymph node. 2. Left lower lobe bullous emphysema associated with scarring. Lumbar Spine MRI:   No evidence of osseous metastatic disease. No canal or foraminal stenosis. Mild effacement of nerve roots extending to the left S1 foramen. Echo:  Left ventricle: Systolic function was normal. Ejection fraction was  estimated to be 65 %. There were no regional wall motion abnormalities. A / P:   1. Acute respiratory distress (11/5/2017): On oxygen, other measures as below. Pulmonology following. 2. COPD exacerbation ([facility] [address] (11/5/2017): Scheduled nebs, IV solumedrol. 3. Leg weakness: Neuro work up in progress. 4. SCLC, unresectable: Opdivo on hold for last few weeks on fear that it is exacerbating his COPD.   5. Elevated lactic acid. 6. Hypertension: Controlled. 7. Falls/Deconditioning: PT / OT as breathing improves. 8. ETOH: CIWA protocol. 9. Mucocitis  10.  Code: Partial.

## 2017-11-07 NOTE — PROGRESS NOTES
Oncology Nursing Communication Tool  6:54 PM  11/7/2017     Bedside and Verbal shift change report given to [facility] AT CHRISTOPHER ABARCA, RN (incoming nurse) by Milena Treadwell (outgoing nurse) on Lilliam Wray. Report included the following information SBAR, Kardex, Intake/Output, MAR, Accordion and Recent Results. Patient current cardiac rhythm is normal sinus. Significant changes during shift: Patient worked with PT today. Issues for physician to address: none       Oncology Shift Note   Admission Date 11/5/2017   Admission Diagnosis COPD exacerbation ([address]  Acute respiratory distress   Code Status Partial Code   Consults IP CONSULT TO PULMONOLOGY      Cardiac Monitoring [x] Yes [] No      Purposeful Hourly Rounding [x] Yes    Robert Score Total Score: 4   Robert score 3 or > [] Bed Alarm [] Avasys [] 1:1 sitter [] Patient refused (Place signed refusal form in chart)      Pain Managed [x] Yes [] No    Key Pain Meds             aspirin (ASPIRIN) 325 mg tablet  (Taking) Take 81 mg by mouth daily. Influenza Vaccine Received Flu Vaccine for Current Season (usually Sept-March): Yes (august 2017)           Oxygen needs? [x] Room air Oxygen @  []1L    []2L    []3L   []4L    []5L   []6L     Use home O2? [] Yes [] No  Perform O2 challenge test using  smartphrase (.Homeoxygen)      Last bowel movement Last Bowel Movement Date: 11/05/17      Urinary Catheter             LDAs             Venous Access Device right chest [facility] 11/05/17 Upper chest (subclavicular area, right (Active)   Central Line Being Utilized Yes 11/7/2017  2:30 PM   Criteria for Appropriate Use Limited/no vessel suitable for conventional peripheral access 11/7/2017  2:30 PM   Site Assessment Clean, dry, & intact 11/7/2017  2:30 PM   Date of Last Dressing Change 11/05/17 11/7/2017  2:30 PM   Dressing Status Clean, dry, & intact 11/7/2017  2:30 PM   Dressing Type Disk with Chlorhexadine gluconate (CHG); Tape;Transparent 11/7/2017  2:30 PM Action Taken Blood drawn 11/6/2017  5:22 AM   Date Accessed (Medial Site) 11/05/17 11/5/2017 10:59 PM   Access Time (Medial Site) 2255 11/5/2017 10:59 PM   Access Needle Size (Site #1) 20 G 11/5/2017 10:59 PM   Access Needle Length (Medial Site) 1 inch 11/5/2017 10:59 PM   Positive Blood Return (Medial Site) Yes 11/7/2017  2:30 PM   Action Taken (Medial Site) Infusing 11/7/2017  2:30 PM      Peripheral IV 11/05/17 Left Antecubital (Active)   Site Assessment Clean, dry, & intact 11/7/2017  2:30 PM   Phlebitis Assessment 0 11/7/2017  2:30 PM   Infiltration Assessment 0 11/7/2017  2:30 PM   Dressing Status Clean, dry, & intact 11/7/2017  2:30 PM   Dressing Type Tape;Transparent 11/7/2017  2:30 PM   Hub Color/Line Status Pink 11/7/2017  2:30 PM   Alcohol Cap Used Yes 11/5/2017  3:16 PM                         Readmission Risk Assessment Tool Score Low Risk            8       Total Score        3 Has Seen PCP in Last 6 Months (Yes=3, No=0)    2 . Living with Significant Other. Assisted Living. LTAC. SNF. or   Rehab    3 Charlson Comorbidity Score (Age + Comorbid Conditions)        Criteria that do not apply:    Patient Length of Stay (>5 days = 3)    IP Visits Last 12 Months (1-3=4, 4=9, >4=11)    Pt. Coverage (Medicare=5 , Medicaid, or Self-Pay=4)       Expected Length of Stay 3d 2h   Actual Length of Stay 2          Opportunity for questions and clarifications were given to the incoming nurse. Patient's bed is in low position, side rails x2, door open PRN, call bell within reach and patient not in distress.       Katy Ramirez

## 2017-11-07 NOTE — PROGRESS NOTES
Problem: Mobility Impaired (Adult and Pediatric)  Goal: *Acute Goals and Plan of Care (Insert Text)  Physical Therapy Goals  Initiated 11/7/2017  1. Patient will move from supine to sit and sit to supine , scoot up and down and roll side to side in bed with independence within 7 day(s). 2.  Patient will transfer from bed to chair and chair to bed with independence using the least restrictive device within 7 day(s). 3.  Patient will perform sit to stand with independence within 7 day(s). 4.  Patient will ambulate with modified independence for 100 feet with the least restrictive device within 7 day(s). physical Therapy EVALUATION  Patient: Chloe Hays (74 y.o. male)  Date: 11/7/2017  Primary Diagnosis: COPD exacerbation ([facility] [address]  Acute respiratory distress        Precautions:   Fall; [place]    ASSESSMENT :  Based on the objective data described below, the patient presents with impaired functional mobility secondary to poor respiratory status, impaired standing balance, mild gait impairments, generalized weakness, and poor activity tolerance following admission for COPD exacerbation. Pt received supine in bed with caregivers present and agreeable to PT evaluation. Pt cleared by nursing for mobility. Functional mobility performed at [facility] for bed mobility and CGA for transfers. Pt significantly limited by poor respiratory status as he demonstrated marked ARZOLA with all activity with SaO2 >92% and -120s bpm. Pt took short steps from bed>chair with CGA, demonstrating slow, shuffled steps and narrowed RIO throughout. Pt unable to tolerate further mobility, therefore was positioned in recliner with pillows for comfort and to help pt relax trunk and BUEs as pt very rigid and with increased use of accessory ms for breathing. Pt is very [place], requiring occasional use of note pad for writing for communication. Pt was left with all needs met, VSS, caregivers present, and RN aware following therapy session.  Anticipate pt will be able to return home with family support and OP cardiopulmonary rehab pending progress in acute PT. PT will continue to follow pt while he is in the hospital to address functional impairments as listed above. Patient will benefit from skilled intervention to address the above impairments. Patients rehabilitation potential is considered to be Guarded  Factors which may influence rehabilitation potential include:   []         None noted  []         Mental ability/status  [x]         Medical condition  []         Home/family situation and support systems  []         Safety awareness  []         Pain tolerance/management  []         Other:      PLAN :  Recommendations and Planned Interventions:  [x]           Bed Mobility Training             []    Neuromuscular Re-Education  [x]           Transfer Training                   []    Orthotic/Prosthetic Training  [x]           Gait Training                         []    Modalities  [x]           Therapeutic Exercises           []    Edema Management/Control  [x]           Therapeutic Activities            [x]    Patient and Family Training/Education  []           Other (comment):    Frequency/Duration: Patient will be followed by physical therapy  4 times a week to address goals. Discharge Recommendations: To Be Determined - cardiopulmonary rehab? Further Equipment Recommendations for Discharge: TBD - likely none     SUBJECTIVE:   Patient stated I'm sorry I can't hear you.     OBJECTIVE DATA SUMMARY:   HISTORY:    Past Medical History:   Diagnosis Date    Arthritis     Asthma 5/21/2010    B12 deficiency 2/19/2016    Cancer ([facility] [address]     Lung    COPD (chronic obstructive pulmonary disease) ([facility] [address] 5/21/2010    asthma, uses inhalers    Hyperlipidemia 5/21/2010    Hypertension 5/21/2010    Other ill-defined conditions(799.89) 10/31/13    CURRENTLY BEING TRX FOR COLD    Other ill-defined conditions(799.89)     AVASCULAR NECROSIS OF HIPS/ bilateral hip replacement      Past Surgical History:   Procedure Laterality Date    HX GI      COLONOSCOPY    HX HEENT      WISDOM TEETH    HX HIP REPLACEMENT Right 2014    Dr. Marija Jose  11/2014    L hip    HX LUMBAR LAMINECTOMY  1990s    HX ORTHOPAEDIC  25 years ago    lumbar laminectomy    HX ORTHOPAEDIC      left hip replacement    HX OTHER SURGICAL      egd    HX OTHER SURGICAL      bronchoscopy with biopsy    TOTAL HIP ARTHROPLASTY Right 11/2013     Prior Level of Function/Home Situation: Pt is mod I with rollator at baseline. Lives with wife. Has good family support in the area. Personal factors and/or comorbidities impacting plan of care: COPD; lung CA    Home Situation  Home Environment: Private residence  # Steps to Enter: 0  One/Two Story Residence: Two story, live on 1st floor  Living Alone: No  Support Systems: Child(krishna), Family member(s), Spouse/Significant Other/Partner  Patient Expects to be Discharged to[de-identified] Private residence  Current DME Used/Available at Home: Blood pressure cuff, Nebulizer, Raised toilet seat, Shower chair, Walker, rollator    EXAMINATION/PRESENTATION/DECISION MAKING:   Critical Behavior:  Neurologic State: Alert  Orientation Level: Oriented X4  Cognition: Appropriate decision making, Appropriate for age attention/concentration, Appropriate safety awareness, Follows commands     Hearing:   Auditory  Auditory Impairment: Hard of hearing, bilateral (lost hearing about 1 month ago)  Skin:  Intact  Edema: None  Range Of Motion:  AROM: Within functional limits           PROM: Within functional limits           Strength:    Strength: Generally decreased, functional                    Tone & Sensation:   Tone: Normal                              Coordination:  Coordination: Within functional limits  Vision:      Functional Mobility:  Bed Mobility:  Rolling: Stand-by asssistance  Supine to Sit: Stand-by asssistance     Scooting: Stand-by asssistance  Transfers:  Sit to Stand: Contact guard assistance  Stand to Sit: Contact guard assistance        Bed to Chair: Contact guard assistance              Balance:   Sitting: Intact  Standing: Impaired; Without support  Standing - Static: Good  Standing - Dynamic : Fair  Ambulation/Gait Training:   Pt took short steps from bed>chair with CGA x 1, demonstrating slow, shuffled gait and narrowed RIO. Functional Measure:  Barthel Index:    Bathin  Bladder: 10  Bowels: 10  Groomin  Dressing: 10  Feeding: 10  Mobility: 0  Stairs: 0  Toilet Use: 5  Transfer (Bed to Chair and Back): 10  Total: 60       Barthel and G-code impairment scale:  Percentage of impairment CH  0% CI  1-19% CJ  20-39% CK  40-59% CL  60-79% CM  80-99% CN  100%   Barthel Score 0-100 100 99-80 79-60 59-40 20-39 1-19   0   Barthel Score 0-20 20 17-19 13-16 9-12 5-8 1-4 0      The Barthel ADL Index: Guidelines  1. The index should be used as a record of what a patient does, not as a record of what a patient could do. 2. The main aim is to establish degree of independence from any help, physical or verbal, however minor and for whatever reason. 3. The need for supervision renders the patient not independent. 4. A patient's performance should be established using the best available evidence. Asking the patient, friends/relatives and nurses are the usual sources, but direct observation and common sense are also important. However direct testing is not needed. 5. Usually the patient's performance over the preceding 24-48 hours is important, but occasionally longer periods will be relevant. 6. Middle categories imply that the patient supplies over 50 per cent of the effort. 7. Use of aids to be independent is allowed. Zain Bucio., Barthel, D.W. (1255). Functional evaluation: the Barthel Index. [address] [facility] (14)2. STEVE Yañez, Carolyn Coronado., Queenie Shields., Ann Campos, [facility] ().  Measuring the change indisability after inpatient rehabilitation; comparison of the responsiveness of the Barthel Index and Functional Pala Measure. Journal of Neurology, Neurosurgery, and Psychiatry, 66(4), 263-669. LYDIA Sandoval.BRIT, JIE Alvarez, & Camilo Greenfield M.A. (2004.) Assessment of post-stroke quality of life in cost-effectiveness studies: The usefulness of the Barthel Index and the EuroQoL-5D. Quality of Life Research, 13, 123-01       G codes: In compliance with CMSs Claims Based Outcome Reporting, the following G-code set was chosen for this patient based on their primary functional limitation being treated: The outcome measure chosen to determine the severity of the functional limitation was the Barthel Index with a score of 60/100 which was correlated with the impairment scale.     ? Mobility - Walking and Moving Around:     - CURRENT STATUS: CJ - 20%-39% impaired, limited or restricted    - GOAL STATUS: CI - 1%-19% impaired, limited or restricted    - D/C STATUS:  ---------------To be determined---------------      Physical Therapy Evaluation Charge Determination   History Examination Presentation Decision-Making   MEDIUM  Complexity : 1-2 comorbidities / personal factors will impact the outcome/ POC  HIGH Complexity : 4+ Standardized tests and measures addressing body structure, function, activity limitation and / or participation in recreation  MEDIUM Complexity : Evolving with changing characteristics  Other outcome measures Barthel Index  MEDIUM      Based on the above components, the patient evaluation is determined to be of the following complexity level: MEDIUM    Pain:  Pain Scale 1: Numeric (0 - 10)  Pain Intensity 1: 4  Pain Location 1: Leg  Pain Orientation 1: Right  Pain Description 1: Aching  Pain Intervention(s) 1: Medication (see MAR)  Activity Tolerance:   Poor - significant ARZOLA with VSS throughout on RA; pt with c/o lightheadedness with initial sitting with BP and SaO2 stable and -120s with activity  Please refer to the flowsheet for vital signs taken during this treatment. After treatment:   [x]         Patient left in no apparent distress sitting up in chair  []         Patient left in no apparent distress in bed  [x]         Call bell left within reach  [x]         Nursing notified  [x]         Caregiver present  []         Bed alarm activated    COMMUNICATION/EDUCATION:   The patients plan of care was discussed with: Physical Therapist and Registered Nurse. [x]         Fall prevention education was provided and the patient/caregiver indicated understanding. [x]         Patient/family have participated as able in goal setting and plan of care. [x]         Patient/family agree to work toward stated goals and plan of care. []         Patient understands intent and goals of therapy, but is neutral about his/her participation. []         Patient is unable to participate in goal setting and plan of care.     Thank you for this referral.  Jeanie Hackett, PT, DPT   Time Calculation: 19 mins

## 2017-11-07 NOTE — PROGRESS NOTES
ADULT PROTOCOL: JET AEROSOL ASSESSMENT    Patient  Erwin Fischer     63 y.o.   male     11/6/2017  8:01 PM    Breath Sounds Pre Procedure: Right Breath Sounds: Diminished                               Left Breath Sounds: Diminished    Breath Sounds Post Procedure: Right Breath Sounds: Diminished                                 Left Breath Sounds: Diminished    Breathing pattern: Pre procedure Breathing Pattern: Regular          Post procedure Breathing Pattern: Regular    Heart Rate: Pre procedure Pulse: 95           Post procedure Pulse: 96    Resp Rate: Pre procedure Respirations: 20           Post procedure Respirations: 20    Peak Flow: Pre bronchodilator   n/a          Post bronchodilator   n/a    FVC/FEV1: n/a    Incentive Spirometry:   n/a      n/a    Cough: Pre procedure Cough: Non-productive               Post procedure Cough: Non-productive    Suctioned: NO    Sputum: Pre procedure  n/a                 Post procedure  n/a    Oxygen: O2 Device: Room air   FiO2 (%) rm air 21%     Changed: NO    SpO2: Pre procedure SpO2: 98 %   without oxygen              Post procedure SpO2: 99 %  without oxygen    Nebulizer Therapy: Current medications Aerosolized Medications: DuoNeb, Pulmicort      Changed: YES    Smoking History: unknown    Problem List:   Patient Active Problem List   Diagnosis Code    Hypertension I10    COPD (chronic obstructive pulmonary disease) ([facility]) J44.9    Asthma J45.909    Hyperlipidemia E78.5    Hyponatremia E87.1    Acute renal failure ([facility]) N17.9    Osteoarthritis of right hip M16.11    DJD (degenerative joint disease) of hip M16.9    Esophageal obstruction due to food impaction K22.2, H07.421I    B12 deficiency E53.8    S/P bronchoscopy with biopsy Z98.890    Chronic coronary artery disease I25.10    Gastroesophageal reflux disease with esophagitis K21.0    Cancer of lung ([facility]) C34.90    Lung cancer ([facility]) C34.90    Acute respiratory distress R06.03    COPD exacerbation [facility]) J44.1       Respiratory Therapist: Emily Wilson, RT

## 2017-11-07 NOTE — PROGRESS NOTES
Oncology Nursing Communication Tool  7:43 AM  11/7/2017     Bedside shift change report given to Jo Maldonado, RN (incoming nurse) by Daniel Dykes RN (outgoing nurse) on Abdi Watts. Report included the following information SBAR, Kardex and MAR. Significant changes during shift: none      Issues for physician to address: _       Oncology Shift Note   Admission Date 11/5/2017   Admission Diagnosis COPD exacerbation ([facility] [address]  Acute respiratory distress   Code Status Partial Code   Consults IP CONSULT TO PULMONOLOGY      Cardiac Monitoring [x] Yes [] No      Purposeful Hourly Rounding [x] Yes    Robert Score Total Score: 4   Robert score 3 or > [x] Bed Alarm [] Avasys [] 1:1 sitter [] Patient refused (Place signed refusal form in chart)      Pain Managed [x] Yes [] No    Key Pain Meds             aspirin (ASPIRIN) 325 mg tablet  (Taking) Take 81 mg by mouth daily. Influenza Vaccine Received Flu Vaccine for Current Season (usually Sept-March): Yes (august 2017)           Oxygen needs?  [] Room air Oxygen @  []1L    []2L    []3L   []4L    []5L   []6L     Use home O2? [] Yes [] No  Perform O2 challenge test using  smartphrase (.Homeoxygen)      Last bowel movement Last Bowel Movement Date: 11/05/17      Urinary Catheter             LDAs             Venous Access Device right chest [facility] 11/05/17 Upper chest (subclavicular area, right (Active)   Central Line Being Utilized Yes 11/6/2017  7:54 PM   Criteria for Appropriate Use Limited/no vessel suitable for conventional peripheral access 11/6/2017  7:54 PM   Site Assessment Clean, dry, & intact 11/6/2017  7:54 PM   Date of Last Dressing Change 11/05/17 11/6/2017  7:54 PM   Dressing Status Clean, dry, & intact 11/6/2017  7:54 PM   Dressing Type Disk with Chlorhexadine gluconate (CHG) 11/6/2017  7:54 PM   Action Taken Blood drawn 11/6/2017  5:22 AM   Date Accessed (Medial Site) 11/05/17 11/5/2017 10:59 PM   Access Time (Medial Site) [id number] 11/5/2017 10:59 PM   Access Needle Size (Site #1) 20 G 11/5/2017 10:59 PM   Access Needle Length (Medial Site) 1 inch 11/5/2017 10:59 PM   Positive Blood Return (Medial Site) Yes 11/6/2017  5:22 AM   Action Taken (Medial Site) Flushed; Infusing 11/6/2017  5:22 AM      Peripheral IV 11/05/17 Left Antecubital (Active)   Site Assessment Clean, dry, & intact 11/6/2017  7:54 PM   Phlebitis Assessment 0 11/6/2017  7:54 PM   Infiltration Assessment 0 11/6/2017  7:54 PM   Dressing Status Clean, dry, & intact 11/6/2017  7:54 PM   Dressing Type Tape 11/6/2017  7:54 PM   Hub Color/Line Status Pink 11/6/2017  7:54 PM   Alcohol Cap Used Yes 11/5/2017  3:16 PM                         Readmission Risk Assessment Tool Score Low Risk            8       Total Score        3 Has Seen PCP in Last 6 Months (Yes=3, No=0)    2 . Living with Significant Other. Assisted Living. LTAC. SNF. or   Rehab    3 Charlson Comorbidity Score (Age + Comorbid Conditions)        Criteria that do not apply:    Patient Length of Stay (>5 days = 3)    IP Visits Last 12 Months (1-3=4, 4=9, >4=11)    Pt. Coverage (Medicare=5 , Medicaid, or Self-Pay=4)       Expected Length of Stay 3d 2h   Actual Length of Stay 2          Opportunity for questions and clarifications were given to the incoming nurse. Patient's bed is in low position, side rails x2, door open PRN, call bell within reach and patient not in distress.       Luisa Patiño, [place]

## 2017-11-07 NOTE — PROGRESS NOTES
Patient c/o indigestion. Patient reports he usually takes TUMs at home. Patient denies chest pain, or arm pain. Patient reports it feels like it always does when he has indigestion. RN paged Dr. Juan Rivas to inform and is currently awaiting return call. 1531:  RN received return call from Dr. Juan Rivas. Dr. Juan Rivas advised of above. RN given orders to give maalox q4 prn. RN reviewed drug- drug interaction with Dr. Juan Rivas. RN to administer medication as ordered. RN will continue to monitor.

## 2017-11-07 NOTE — PROGRESS NOTES
[facility] Consult Service Note  Pulmonary, Critical Care, and Sleep Medicine    Name: Sherryle Revel MRN: [id number]   : 1955 Hospital: [facility]   Date: 2017   Hospital Day: 3       Subjective/Interval History:   I have reviewed the flowsheet and previous days notes. Seen earlier today on rounds.  Feels no better but wheezes better on my exam. Mild cough, dry. No fever. Waiting for PT to work with him    IMPRESSION:   1. COPD exacerbation with severe inspiratory and expiratory wheezing. Bullous emphysema -he has poor ventilatory reserve and his quality of life is severely affected by the severity of COPD. He will continue to require treatments at home and his potential for re-admission is very high  2. Tobacco use- active  3. Squamous cell lung cancer, unresectable: Opdivo on hold for last few weeks on fear that it is exacerbating his COPD. Overall good response  4. Left hilar LN   5. Leg weakness: Neuro work up in progress. 6. Hypertension: Controlled. 7. Falls/Deconditioning: PT / OT as breathing improves. 8. ETOH: CIWA protocol. 9. Mucositis      RECOMMENDATIONS/PLAN:   1. Wean steroids  2. Mobilize  3. scheduled Nebs  4. no indication for antibiotics at this time       Code: Partial Code      Risk of deterioration: medium   [x] High complexity decision making was performed    Subjective/Initial History:   I have reviewed the flowsheet and previous days notes. I was asked by Ye Combs MD to see Sherryle Revel in consultation for a chief complaint of  COPD, SOB and wheezes. Sherryle Revel is a 63 y.o. male with PMhx significant for lung CA, COPD, asthma, who presented by personal vehicle to the ED with cc of weakness and increased SOB. Pt was ambulating with his walker when his legs suddenly gave out on him. He was unable to stand up on his own and had to call for his daughter to help.  Pt had a similar episode the other day and 3 weeks ago. Pt had increased SOB over the past 5-6 weeks from his baseline, which he states is from his lung CA, COPD and asthma. He was started on Opdivo ~1 month ago by Dr. Kendra Brannon, oncology. Pt is not on home O2. He uses a home nebulizer TID. Pt also notes recently starting Tylenol Arthiritis for radicular back pain. He continues to smoke ~5-6 cigarettes per day and drinks. He denies fever, chills, CP.    CTA negative for PE. Marked wheezing and no congestion. No fever. Allergies   Allergen Reactions    Amoxicillin Other (comments)     Headache    Hydrochlorothiazide Other (comments)     Hyponatremia        MAR reviewed and pertinent medications noted or modified as needed     Current Facility-Administered Medications   Medication    guaiFENesin ER (MUCINEX) tablet 600 mg    albuterol/ipratropium (DUONEB) neb solution    methylPREDNISolone (PF) (SOLU-MEDROL) injection 40 mg    gabapentin (NEURONTIN) capsule 200 mg    lisinopril (PRINIVIL, ZESTRIL) tablet 10 mg    umeclidinium (INCRUSE ELLIPTA) 62.5 mcg/actuation    budesonide (PULMICORT) 500 mcg/2 ml nebulizer suspension    albuterol (PROVENTIL VENTOLIN) nebulizer solution 2.5 mg    nicotine (NICODERM CQ) 7 mg/24 hr patch 1 Patch    sodium chloride (NS) flush 5-10 mL    sodium chloride (NS) flush 5-10 mL    acetaminophen (TYLENOL) tablet 650 mg    ondansetron (ZOFRAN) injection 4 mg    enoxaparin (LOVENOX) injection 40 mg    0.9% sodium chloride infusion    aspirin chewable tablet 81 mg    klack's mouthwash oral suspension (COMPOUNDED)    diazePAM (VALIUM) tablet 5 mg    diazePAM (VALIUM) tablet 10 mg    folic acid (FOLVITE) tablet 1 mg    thiamine (B-1) tablet 100 mg    therapeutic multivitamin (THERAGRAN) tablet 1 Tab        PMH:  has a past medical history of Arthritis; Asthma (5/21/2010); B12 deficiency (2/19/2016); Cancer [facility]); COPD (chronic obstructive pulmonary disease) ([facility] [address] (5/21/2010);  Hyperlipidemia (2010); Hypertension (2010); Other ill-defined conditions(799.89) (10/31/13); and Other ill-defined conditions(799.89). He also has no past medical history of Adverse effect of anesthesia; Difficult intubation; Malignant hyperthermia due to anesthesia; Nausea & vomiting; or Pseudocholinesterase deficiency. PSH:   has a past surgical history that includes lumbar laminectomy (); heent; gi; hip replacement (Right, ); hip replacement (2014); other surgical; orthopaedic (25 years ago); total hip arthroplasty (Right, 2013); orthopaedic; and other surgical.   FHX: family history includes Diabetes in his father; Hypertension in his mother; Lung Disease in his mother; Obesity in his sister. There is no history of Anesth Problems. SHX:  reports that he has been smoking Cigarettes. He has a 7.50 pack-year smoking history. He has never used smokeless tobacco. He reports that he drinks about 12.0 oz of alcohol per week  He reports that he does not use illicit drugs. ROS:A comprehensive review of systems was negative except for that written in the HPI.       Objective:     Vital Signs: Intake/Output: Intake/Output:   Visit Vitals    /72 (BP 1 Location: Left arm, BP Patient Position: At rest;Head of bed elevated (Comment degrees))    Pulse 77    Temp 97.6 °F (36.4 °C)    Resp 22    Ht 6' (1.829 m)    Wt 77.1 kg (170 lb)    SpO2 100%    BMI 23.06 kg/m2           O2 Device: Room air     Wt Readings from Last 4 Encounters:   17 77.1 kg (170 lb)   10/09/17 71.2 kg (157 lb)   17 71.5 kg (157 lb 9.6 oz)   17 73 kg (160 lb 15 oz)       Temp (24hrs), Av.8 °F (36.6 °C), Min:97.1 °F (36.2 °C), Max:98.4 °F (36.9 °C)     Intake/Output Summary (Last 24 hours) at 17 0924  Last data filed at 17 0823   Gross per 24 hour   Intake             1305 ml   Output             1900 ml   Net             -595 ml     Last shift:      701 - 1900  In: -   Out: 936 [Urine:350]  Last 3 shifts: 11/05 1901 - 11/07 0700  In: 2567.5 [P.O.:1000; I.V.:1567.5]  Out: 5721 [Urine:4050]     Exam:    Physical Exam:    General: WDWNWM  in no respiratory distress and acyanotic, alert, oriented times 3, cooperative and moderately ill   HEENT: NCAT; No Thrush; class 4 airway   Neck: No abnormally enlarged lymph nodes. Chest: increased AP diameter   Lungs: distant BS, no wheeze today   Heart: Regular rate and rhythm   Abdomen: soft, non-tender, without masses or organomegaly   :    Extremity: negative, cyanosis    Neuro: alert   Psych: oriented to time, place and person   Skin: Skin unremarkable;    Pulses:Bilateral, Radial, 2+   Capillary refill:  well perfused, brisk capillary refill,    Data:     Interval lab and diagnostic data was reviewed. Interval radiology images were independently viewed and available reports were reviewed. All lab results for the last 24 hours reviewed.              Labs:    Recent Labs      11/06/17 0527 11/05/17   1509   WBC  5.0  9.0   HGB  12.0*  14.7   PLT  171  226     Recent Labs      11/06/17 0527 11/05/17 2256 11/05/17 1922 11/05/17   1509   NA  134*   --    --   131*   K  3.8   --    --   4.4   CL  102   --    --   94*   CO2  22   --    --   27   GLU  233*   --    --   73   BUN  14   --    --   10   CREA  1.05   --    --   1.12   CA  7.9*   --    --   9.3   MG  1.7   --    --    --    PHOS  2.8   --    --    --    LAC   --   1.4  2.9*  3.9*   ALB  2.6*   --    --   3.6   SGOT  17   --    --   28   ALT  25   --    --   34     Recent Labs      11/05/17   1845   PH  7.41   PCO2  37   PO2  86   HCO3  23     Recent Labs      11/06/17 0527 11/05/17 2256 11/05/17   1509   CPK   --    --   75   CKNDX   --    --   3.2*   TROIQ  <0.04  <0.04  <0.04     No results found for: BNPP, BNP     Imaging:  I have personally reviewed the patients radiographs and have reviewed the reports:          Results from Hospital Encounter encounter on 11/05/17   XR CHEST PORT   Narrative PORTABLE CHEST RADIOGRAPH/S: 11/5/2017 3:59 PM    INDICATION: Chest pain. Weakness walking last week, with legs giving out,  dizziness, fatigue. COMPARISON: 10/9/2017, 7/11/2017, 10/21/2016. CT chest 10/9/2017. TECHNIQUE: Portable frontal semiupright radiograph/s of the chest.    FINDINGS:   Bullous emphysema in the left lower lobe causes chronic atelectasis/scarring  superior to it. There is left paratracheal paraseptal emphysema as well. No  superimposed acute airspace consolidation. Wedge resection staple line in the  right midlung. A right IJ ported catheter terminates in the right atrium. No  pneumothorax and no large pleural effusion. Chronic blunting of the left  costophrenic angle. Impression IMPRESSION:   No acute disease in the chest. Emphysema. Results from [place] encounter on 11/05/17   CTA CHEST W OR W WO CONT   Narrative CT ANGIOGRAPHY CHEST. 11/5/2017 4:42 PM     INDICATION: Lung carcinoma, worsening dyspnea. COMPARISON: 10/9/2017, 1/29/2016. TECHNIQUE: CT angiography of the chest was performed after the administration of  100 cc IV contrast (Isovue 370). Coronal and sagittal, and coronal MIP  reconstructions were performed. CT dose reduction was achieved through use of a  standardized protocol tailored for this examination and automatic exposure  control for dose modulation. FINDINGS:  Bullous emphysema in the left lower lobe causes chronic atelectasis/scarring  superior to it. There is bullous paraseptal emphysema along the anterior  junction line bilaterally in the upper lobes. There is a wedge resection staple  line along the major fissure in the right lung. A mildly enlarged left hilar  lymph node measures 15 mm in short axis. There is chronic left pleural  thickening with minimal calcification. There is no pulmonary embolism. The heart  size is normal. Three-vessel coronary artery calcifications are moderate. A  right IJ ported catheter terminates in the right atrium. The visualized upper  abdomen is normal.         Impression IMPRESSION:   1. Mildly enlarged left hilar lymph node. 2. Left lower lobe bullous emphysema associated with scarring. My assessment/plan was discussed with:  nursing    respiratory therapy Dr. crystal      Complex decision making was performed which includes reviewing the patient's past medical records, current laboratory results, medications, ECG and actual Xray films, immediately available at the bedside to the patient    Thank you for allowing us to participate in the care of this patient. We will be happy to follow with you.     Estrella Whitaker MD

## 2017-11-07 NOTE — PROGRESS NOTES
Pt is a 63 y.o.  male, admitted for hyponatremia. 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 Pt preferred CM to contact his wife: [facility] ([phone]). CM contacted JERRY LEIVN, via telephone and she reported that she and pt resides in their two story home (bedroom on first floor-no steps into main entrance). Pt is independent with ADLs, and does limited driving. Pt is active with PCP: last seen Aug 2017, and he uses [facility] and [facility] ( [facility]). Pt has DME at home: rollator, walker, cane, shower chair, bedside commode. Pt has had HH in the past, but reported no SNF. Pt will have transportation home at d/c. Pt will be followed by PT/OT, for additional recommendations, upon d/c. CM will continue to follow up with pt and make referrals as deemed necessary. Care Management Interventions  PCP Verified by CM: Yes  Mode of Transport at Discharge:  Other (see comment) (family will transport home )  Transition of Care Consult (CM Consult): Discharge Planning  Discharge Durable Medical Equipment: No  Physical Therapy Consult: Yes  Occupational Therapy Consult: Yes  Speech Therapy Consult: No  Current Support Network: Lives with Spouse, Own Home  Confirm Follow Up Transport: Family  Plan discussed with Pt/Family/Caregiver: Yes  Discharge Location  Discharge Placement: [facility]/ [facility] [address], MSW   [phone]

## 2017-11-08 NOTE — PROGRESS NOTES
Problem: Mobility Impaired (Adult and Pediatric)  Goal: *Acute Goals and Plan of Care (Insert Text)  Physical Therapy Goals  Initiated 11/7/2017  1. Patient will move from supine to sit and sit to supine , scoot up and down and roll side to side in bed with independence within 7 day(s). 2.  Patient will transfer from bed to chair and chair to bed with independence using the least restrictive device within 7 day(s). 3.  Patient will perform sit to stand with independence within 7 day(s). 4.  Patient will ambulate with modified independence for 100 feet with the least restrictive device within 7 day(s). physical Therapy TREATMENT  Patient: Julieth Balderas (89 y.o. male)  Date: 11/8/2017  Diagnosis: COPD exacerbation ([facility] [address]  Acute respiratory distress <principal problem not specified>       Precautions: Fall  Chart, physical therapy assessment, plan of care and goals were reviewed. ASSESSMENT:  Pt cleared by nurse to mobilize. Pt received in bed supine. Pt [place] and takes time to communicate for therapy. Pt with short shallow breathes but o2 stats at 97%. Pt agreeable to getting to chair. Pt reported before coming to hospital his legs would just give out and he would fall. Pt performed bed mobility at [facility]. Pt required  seated rest break while sitting EOB to decrese HR and RR. Pt able to perform x2 LAQ with great effort. Pt performed bed to chair transfer with RW at [facility]. Pts RR increased to 40 but o2 stats at 96%. Pt very fatigued after and working very hard to breathe once in chair. Pt agreeable to staying up in chair for 30 mins. Pt will benefit from pulmonary rehab to improve endurance and strength.     Progression toward goals:  []      Improving appropriately and progressing toward goals  [x]      Improving slowly and progressing toward goals  []      Not making progress toward goals and plan of care will be adjusted     PLAN:  Patient continues to benefit from skilled intervention to address the above impairments. Continue treatment per established plan of care. Discharge Recommendations:  pulmonary rehab  Further Equipment Recommendations for Discharge:  none     SUBJECTIVE:   Patient stated I can probably only get to the chair.     OBJECTIVE DATA SUMMARY:   Critical Behavior:  Neurologic State: Alert, Appropriate for age  Orientation Level: Oriented X4  Cognition: Appropriate decision making, Appropriate for age attention/concentration, Appropriate safety awareness  Safety/Judgement: Awareness of environment, Fall prevention, Home safety  Functional Mobility Training:  Bed Mobility:  Rolling: Supervision  Supine to Sit: Supervision     Scooting: Supervision         Transfers:  Sit to Stand: Contact guard assistance  Stand to Sit: Contact guard assistance        Bed to Chair: Contact guard assistance                    Balance:  Sitting: Intact  Standing - Static: Good  Standing - Dynamic : Fair  Ambulation/Gait Training:                                                  Pain:      no complaints of pain. Activity Tolerance:   Pt very fatigued after transfer to chair from bed.    After treatment:   [x] Patient left in no apparent distress sitting up in chair  [] Patient left in no apparent distress in bed  [x] Call bell left within reach  [x] Nursing notified  [] Caregiver present  [x] Bed alarm activated    COMMUNICATION/COLLABORATION:   The patients plan of care was discussed with: Registered Nurse    Yen Mchugh   Time Calculation: 10 mins

## 2017-11-08 NOTE — CDMP QUERY
Dr. Angélica Carranza M.D. :  Please clarify if this patient is being treated/managed for:    =>hyponatremia in setting of -724 with hyponatremia noted in Ed notes. =>Other Explanation of clinical findings  =>Unable to Determine (no explanation of clinical findings)    The medical record reflects the following clinical findings, treatment, and risk factors:    Risk Factors: 62 yo presents for weakness, CODP  Clinical Indicators: Na 131-133, noted hyponatremia in Ed notes  Treatment: [email or web address], 2.3 liter bolus NS, serial labs    Please clarify and document your clinical opinion in the progress notes and discharge summary including the definitive and/or presumptive diagnosis, (suspected or probable), related to the above clinical findings. Please include clinical findings supporting your diagnosis. Thank Diego Bautista  [address]; BKD;4148

## 2017-11-08 NOTE — PROGRESS NOTES
Patient seen by Dr. Tiara Mcleod this morning and notes indicate patient would benefit from [facility]. Patient will discuss with his wife prior to agreeing to go.   Referral sent for evaluation to see if he could be accepted to program.    Malu Hastings RN, BSN, ACM   - Medical Oncology  [phone]

## 2017-11-08 NOTE — PROGRESS NOTES
Oncology Interdisciplinary rounds were held today to discuss patient plan of care and outcomes. The following members were present: Nursing and Case Management.     Plan of Care: PT/OT, IV fluids, cardiac monitoring, pulmonologist consulted    Discharge Disposition: referral for [facility]

## 2017-11-08 NOTE — PROGRESS NOTES
Spiritual Care Partner Volunteer visited patient in Oncology on 11/8/17.   Documented by:  MILTON Pantoja, [facility], sachin PEREA [facility] Paging Service  [phone])

## 2017-11-08 NOTE — PROGRESS NOTES
ADULT PROTOCOL: JET AEROSOL ASSESSMENT    Patient  Claudene Monarch     63 y.o.   male     11/8/2017  10:52 AM    Breath Sounds Pre Procedure: Right Breath Sounds: Expiratory wheezing                               Left Breath Sounds: Expiratory wheezing    Breath Sounds Post Procedure: Right Breath Sounds: Expiratory wheezing                                 Left Breath Sounds: Expiratory wheezing    Breathing pattern: Pre procedure Breathing Pattern: Regular          Post procedure Breathing Pattern: Regular    Heart Rate: Pre procedure Pulse: 78           Post procedure Pulse: 82    Resp Rate: Pre procedure Respirations: 20           Post procedure Respirations: 20            Cough: Pre procedure Cough: Non-productive               Post procedure Cough: Non-productive      Oxygen: O2 Device: Room air        Changed:no  SpO2: Pre procedure SpO2: 98 %                 Post procedure SpO2: 97 %      Nebulizer Therapy: Current medications Aerosolized Medications: Albuterol, Ipratropium bromide, Pulmicort      Changed: no        Problem List:   Patient Active Problem List   Diagnosis Code    Hypertension I10    COPD (chronic obstructive pulmonary disease) ([facility]) J44.9    Asthma J45.909    Hyperlipidemia E78.5    Hyponatremia E87.1    Acute renal failure ([facility]) N17.9    Osteoarthritis of right hip M16.11    DJD (degenerative joint disease) of hip M16.9    Esophageal obstruction due to food impaction K22.2, U29.707O    B12 deficiency E53.8    S/P bronchoscopy with biopsy Z98.890    Chronic coronary artery disease I25.10    Gastroesophageal reflux disease with esophagitis K21.0    Cancer of lung ([facility]) C34.90    Lung cancer ([facility]) C34.90    Acute respiratory distress R06.03    COPD exacerbation ([facility] [address] J44.1       Respiratory Therapist: RT Diane

## 2017-11-08 NOTE — PROGRESS NOTES
Problem: Self Care Deficits Care Plan (Adult)  Goal: *Acute Goals and Plan of Care (Insert Text)  Occupational Therapy Goals:  Initiated 11/8/2017  1. Patient will perform bathing with supervision/set-up within 7 days. 2. Patient will perform dressing with supervision/set-up within 7 days. 3. Patient will perform toileting with supervision/set-up within 7 days. 4. Patient will transfer from toilet with supervision/set-up using the least restrictive device and appropriate durable medical equipment within 7 days. Occupational Therapy EVALUATION  Patient: Danny Sandoval (89 y.o. male)  Date: 11/8/2017  Primary Diagnosis: COPD exacerbation ([facility] [address]  Acute respiratory distress        Precautions:   Fall    ASSESSMENT :  Based on the objective data described below, the patient presents with chronic severe COPD. Pt reports onset of falls over the past 3 weeks due to his \"legs giving out. \"  Pt was seen after inhaler treatment by nursing. Pts O2 sat was 98% on room air this session but pt was severely dyspenic. RR was 40 with basic mobility from bed to chair. Pt reports performing all ADLS on his own and has chronic severe ARZOLA. Unable to mobilize to 2nd floor of home over the past 2 weeks. CGA for mobility due to full body tremors. RW needed for support and pt uses rollator. Only able to tolerate OOB to chair today. Pt is performing UB ADLS at a seated set up level and lower body ADLS at a min assist level. Patient will benefit from skilled intervention to address the above impairments.   Patients rehabilitation potential is considered to be Guarded  Factors which may influence rehabilitation potential include:   []             None noted  []             Mental ability/status  [x]             Medical condition  []             Home/family situation and support systems  []             Safety awareness  []             Pain tolerance/management  []             Other:      PLAN :  Recommendations and Planned Interventions:  [x]               Self Care Training                  [x]        Therapeutic Activities  [x]               Functional Mobility Training    []        Cognitive Retraining  [x]               Therapeutic Exercises           []        Endurance Activities  [x]               Balance Training                   []        Neuromuscular Re-Education  []               Visual/Perceptual Training     [x]   Home Safety Training  [x]               Patient Education                 [x]        Family Training/Education  []               Other (comment):    Frequency/Duration: Patient will be followed by occupational therapy 3 times a week to address goals. Discharge Recommendations: Pulmonary Rehab ? Home health? Further Equipment Recommendations for Discharge: none     SUBJECTIVE:   Patient stated I am always this short of breath.     OBJECTIVE DATA SUMMARY:   HISTORY:   Past Medical History:   Diagnosis Date    Arthritis     Asthma 5/21/2010    B12 deficiency 2/19/2016    Cancer ([facility] [address]     Lung    COPD (chronic obstructive pulmonary disease) ([facility] [address] 5/21/2010    asthma, uses inhalers    Hyperlipidemia 5/21/2010    Hypertension 5/21/2010    Other ill-defined conditions(799.89) 10/31/13    CURRENTLY BEING TRX FOR COLD    Other ill-defined conditions(799.89)     AVASCULAR NECROSIS OF HIPS/ bilateral hip replacement      Past Surgical History:   Procedure Laterality Date    HX GI      COLONOSCOPY    HX HEENT      WISDOM TEETH    HX HIP REPLACEMENT Right 2014    Dr. Mallory Caballero  11/2014    L hip    HX LUMBAR LAMINECTOMY  1990s    HX ORTHOPAEDIC  25 years ago    lumbar laminectomy    HX ORTHOPAEDIC      left hip replacement    HX OTHER SURGICAL      egd    HX OTHER SURGICAL      bronchoscopy with biopsy    TOTAL HIP ARTHROPLASTY Right 11/2013       Prior Level of Function/Environment/Context: ambulated with rollator walker; unable to mobilize to 2nd floor of home recently due to severe SOB; showering on first floor of home; performed ADLS on his own; chronic back pain and sciatic right LE and limited sitting tolerance    Expanded or extensive additional review of patient history:     Home Situation  Home Environment: Private residence  # Steps to Enter: 0  One/Two Story Residence: Two story, live on 1st floor  Living Alone: No  Support Systems: Child(krishna), Family member(s), Spouse/Significant Other/Partner  Patient Expects to be Discharged to[de-identified] Private residence  Current DME Used/Available at Home: Blood pressure cuff, Nebulizer, Raised toilet seat, Shower chair, Walker, rollator  Tub or Shower Type: Shower (first floor of home tub shower 2nd floor)  [x]  Right hand dominant   []  Left hand dominant    EXAMINATION OF PERFORMANCE DEFICITS:  Cognitive/Behavioral Status:  Neurologic State: Alert; Appropriate for age  Orientation Level: Oriented X4  Cognition: Appropriate decision making; Appropriate for age attention/concentration; Appropriate safety awareness  Perception: Appears intact  Perseveration: No perseveration noted  Safety/Judgement: Awareness of environment; Fall prevention;Home safety      Hearing:   Auditory  Auditory Impairment: Hard of hearing, bilateral (lost hearing about 1 month ago)    Vision/Perceptual:                                Corrective Lenses: Glasses    Range of Motion:    AROM: Within functional limits  PROM: Within functional limits                      Strength:    Strength: Generally decreased, functional                Coordination:  Coordination: Generally decreased, functional (tremors throughout body with activity)  Fine Motor Skills-Upper: Left Impaired;Right Impaired (gross tremors but functional)    Gross Motor Skills-Upper: Left Impaired;Right Impaired    Tone & Sensation:    Tone: Normal                         Balance:  Standing - Static: Good  Standing - Dynamic : Fair    Functional Mobility and Transfers for ADLs:  Bed Mobility:  Rolling: Supervision  Supine to Sit: Supervision  Scooting: Supervision    Transfers:  Sit to Stand: Contact guard assistance  Stand to Sit: Stand-by asssistance  Bed to Chair: Contact guard assistance (with rolling walker)  Toilet Transfer : Contact guard assistance    ADL Assessment:  Feeding: Independent (once provided)    Oral Facial Hygiene/Grooming: Setup (once provided seated; unable to perform standing 2/2 resp )    Bathing: Minimum assistance    Upper Body Dressing: Setup    Lower Body Dressing: Minimum assistance    Toileting: Minimum assistance                ADL Intervention and task modifications:  Unable today due to respiratory status  Cognitive Retraining  Safety/Judgement: Awareness of environment; Fall prevention;Home safety       Functional Measure:  Barthel Index:    Bathin  Bladder: 10  Bowels: 10  Groomin  Dressin  Feeding: 10  Mobility: 0  Stairs: 0  Toilet Use: 5  Transfer (Bed to Chair and Back): 10  Total: 55       Barthel and G-code impairment scale:  Percentage of impairment CH  0% CI  1-19% CJ  20-39% CK  40-59% CL  60-79% CM  80-99% CN  100%   Barthel Score 0-100 100 99-80 79-60 59-40 20-39 1-19   0   Barthel Score 0-20 20 17-19 13-16 9-12 5-8 1-4 0      The Barthel ADL Index: Guidelines  1. The index should be used as a record of what a patient does, not as a record of what a patient could do. 2. The main aim is to establish degree of independence from any help, physical or verbal, however minor and for whatever reason. 3. The need for supervision renders the patient not independent. 4. A patient's performance should be established using the best available evidence. Asking the patient, friends/relatives and nurses are the usual sources, but direct observation and common sense are also important. However direct testing is not needed. 5. Usually the patient's performance over the preceding 24-48 hours is important, but occasionally longer periods will be relevant.   6. Middle categories imply that the patient supplies over 50 per cent of the effort. 7. Use of aids to be independent is allowed. Lenore Oliveros., Barthel, D.W. ([id number]). Functional evaluation: the Barthel Index. [address] [facility] (14)2. Keith STEVE Garcia, Becca Arroyo., Gabriel Schumacher., [place], [facility] (1999). Measuring the change indisability after inpatient rehabilitation; comparison of the responsiveness of the Barthel Index and Functional Bremer Measure. Journal of Neurology, Neurosurgery, and Psychiatry, 66(4), 450-327. Rojas Arms, N.J.A, JIE Alvarez, & Alex Alston MEvitaA. (2004.) Assessment of post-stroke quality of life in cost-effectiveness studies: The usefulness of the Barthel Index and the EuroQoL-5D. Quality of Life Research, 13, 050-02         G codes: In compliance with CMSs Claims Based Outcome Reporting, the following G-code set was chosen for this patient based on their primary functional limitation being treated: The outcome measure chosen to determine the severity of the functional limitation was the barthel with a score of 55/100 which was correlated with the impairment scale. ? Self Care:     - CURRENT STATUS: CK - 40%-59% impaired, limited or restricted    - GOAL STATUS: CJ - 20%-39% impaired, limited or restricted    - D/C STATUS:  ---------------To be determined---------------     Occupational Therapy Evaluation Charge Determination   History Examination Decision-Making   LOW Complexity : Brief history review  MEDIUM Complexity : 3-5 performance deficits relating to physical, cognitive , or psychosocial skils that result in activity limitations and / or participation restrictions HIGH Complexity : Patient presents with comorbidities that affect occupational performance.  Signifigant modification of tasks or assistance (eg, physical or verbal) with assessment (s) is necessary to enable patient to complete evaluation       Based on the above components, the patient evaluation is determined to be of the following complexity level: LOW   Pain:                    Activity Tolerance:     Please refer to the flowsheet for vital signs taken during this treatment. After treatment:   [x] Patient left in no apparent distress sitting up in chair  [] Patient left in no apparent distress in bed  [x] Call bell left within reach  [x] Nursing notified  [] Caregiver present  [] Bed alarm activated    COMMUNICATION/EDUCATION:   The patients plan of care was discussed with: Physical Therapy Assistant and patient. [x] Home safety education was provided and the patient/caregiver indicated understanding. [] Patient/family have participated as able in goal setting and plan of care. [x] Patient agree to work toward stated goals and plan of care. [] Patient understands intent and goals of therapy, but is neutral about his/her participation. [] Patient is unable to participate in goal setting and plan of care. This patients plan of care is appropriate for delegation to [facility].     Thank you for this referral.  Roberto Esqueda OTR/L  Time Calculation: 15 mins

## 2017-11-08 NOTE — PROGRESS NOTES
Oncology Nursing Communication Tool  7:33 AM  11/8/2017     Bedside shift change report given to Lissy RN (incoming nurse) by Yariel Gonzalez RN (outgoing nurse) on [facility]. Report included the following information SBAR, Kardex and MAR. Significant changes during shift: none      Issues for physician to address: _       Oncology Shift Note   Admission Date 11/5/2017   Admission Diagnosis COPD exacerbation ([address]  Acute respiratory distress   Code Status Partial Code   Consults IP CONSULT TO PULMONOLOGY      Cardiac Monitoring [x] Yes [] No      Purposeful Hourly Rounding [x] Yes    Robert Score Total Score: 4   Robert score 3 or > [] Bed Alarm [] Avasys [] 1:1 sitter [] Patient refused (Place signed refusal form in chart)      Pain Managed [] Yes [] No    Key Pain Meds             aspirin (ASPIRIN) 325 mg tablet  (Taking) Take 81 mg by mouth daily. Influenza Vaccine Received Flu Vaccine for Current Season (usually Sept-March): Yes (august 2017)           Oxygen needs?  [] Room air Oxygen @  []1L    []2L    []3L   []4L    []5L   []6L     Use home O2? [] Yes [] No  Perform O2 challenge test using  smartphrase (.Homeoxygen)      Last bowel movement Last Bowel Movement Date: 11/05/17      Urinary Catheter             LDAs             Venous Access Device right chest [facility] 11/05/17 Upper chest (subclavicular area, right (Active)   Central Line Being Utilized Yes 11/8/2017  2:11 AM   Criteria for Appropriate Use Limited/no vessel suitable for conventional peripheral access 11/8/2017  2:11 AM   Site Assessment Clean, dry, & intact 11/8/2017  2:11 AM   Date of Last Dressing Change 11/05/17 11/7/2017  9:04 PM   Dressing Status Clean, dry, & intact 11/7/2017  9:04 PM   Dressing Type Disk with Chlorhexadine gluconate (CHG) 11/7/2017  9:04 PM   Action Taken Blood drawn 11/6/2017  5:22 AM   Date Accessed (Medial Site) 11/05/17 11/5/2017 10:59 PM   Access Time (Medial Site) [id number] 11/5/2017 10:59 PM Access Needle Size (Site #1) 20 G 11/5/2017 10:59 PM   Access Needle Length (Medial Site) 1 inch 11/5/2017 10:59 PM   Positive Blood Return (Medial Site) Yes 11/7/2017  2:30 PM   Action Taken (Medial Site) Infusing 11/7/2017  2:30 PM      Peripheral IV 11/05/17 Left Antecubital (Active)   Site Assessment Clean, dry, & intact 11/8/2017  2:11 AM   Phlebitis Assessment 0 11/8/2017  2:11 AM   Infiltration Assessment 0 11/8/2017  2:11 AM   Dressing Status Clean, dry, & intact 11/8/2017  2:11 AM   Dressing Type Tape 11/8/2017  2:11 AM   Hub Color/Line Status Pink 11/7/2017  9:04 PM   Alcohol Cap Used Yes 11/5/2017  3:16 PM                         Readmission Risk Assessment Tool Score Low Risk            8       Total Score        3 Has Seen PCP in Last 6 Months (Yes=3, No=0)    2 . Living with Significant Other. Assisted Living. LTAC. SNF. or   Rehab    3 Charlson Comorbidity Score (Age + Comorbid Conditions)        Criteria that do not apply:    Patient Length of Stay (>5 days = 3)    IP Visits Last 12 Months (1-3=4, 4=9, >4=11)    Pt. Coverage (Medicare=5 , Medicaid, or Self-Pay=4)       Expected Length of Stay 3d 2h   Actual Length of Stay 3          Opportunity for questions and clarifications were given to the incoming nurse. Patient's bed is in low position, side rails x2, door open PRN, call bell within reach and patient not in distress.       [place], [address] [facility]

## 2017-11-08 NOTE — PROGRESS NOTES
[facility] Pulmonary Consult Service Note  Pulmonary, Critical Care, and Sleep Medicine    Name: Linden Do MRN: [id number]   : 1955 Hospital: [facility]   Date: 2017   Hospital Day: 4       Subjective/Interval History:   I have reviewed the flowsheet and previous days notes. Seen earlier today on rounds.  Feels no better but wheezes better on my exam. Mild cough, dry. No fever. Waiting for PT to work with him     Generalized weakness. Able to walk only a few steps to chair with PT.  ROS otherwise unremarkable except as noted elsewhere. IMPRESSION:   1. COPD exacerbation with severe inspiratory and expiratory wheezing. Severr Bullous emphysema -he has poor ventilatory reserve and his quality of life is severely affected by the severity of COPD. He will continue to require treatments at home and his potential for re-admission is very high  2. Tobacco use- active  3. Squamous cell lung cancer, unresectable: Opdivo on hold for last few weeks on fear that it is exacerbating his COPD. Overall good response  4. Left hilar LN   5. Leg weakness: Neuro work up in progress. 6. Hypertension: Controlled. 7. Falls/Deconditioning: PT / OT as breathing improves. 8. ETOH: CIWA protocol. 9. Mucositis      RECOMMENDATIONS/PLAN:   1. Wean steroids  2. Pulmonary rehab- [facility]? ? Inpatient preferable but ideally will need to do more everyday  3. Mobilize  4. scheduled Nebs  5. CIWA  6. no indication for antibiotics at this time       Code: Partial Code      Risk of deterioration: medium   [x] High complexity decision making was performed    Subjective/Initial History:   I have reviewed the flowsheet and previous days notes. I was asked by Sabiha Georges MD to see Linden Do in consultation for a chief complaint of  COPD, SOB and wheezes.     Linden Do is a 63 y.o. male with PMhx significant for lung CA, COPD, asthma, who presented by personal vehicle to the ED with cc of weakness and increased SOB. Pt was ambulating with his walker when his legs suddenly gave out on him. He was unable to stand up on his own and had to call for his daughter to help. Pt had a similar episode the other day and 3 weeks ago. Pt had increased SOB over the past 5-6 weeks from his baseline, which he states is from his lung CA, COPD and asthma. He was started on Opdivo ~1 month ago by Dr. Army Storm, oncology. Pt is not on home O2. He uses a home nebulizer TID. Pt also notes recently starting Tylenol Arthiritis for radicular back pain. He continues to smoke ~5-6 cigarettes per day and drinks. He denies fever, chills, CP.    CTA negative for PE. Marked wheezing and no congestion. No fever.     Allergies   Allergen Reactions    Amoxicillin Other (comments)     Headache    Hydrochlorothiazide Other (comments)     Hyponatremia        MAR reviewed and pertinent medications noted or modified as needed     Current Facility-Administered Medications   Medication    aluminum & magnesium hydroxide-simethicone (MYLANTA II) oral suspension 30 mL    guaiFENesin ER (MUCINEX) tablet 600 mg    albuterol/ipratropium (DUONEB) neb solution    methylPREDNISolone (PF) (SOLU-MEDROL) injection 40 mg    gabapentin (NEURONTIN) capsule 200 mg    lisinopril (PRINIVIL, ZESTRIL) tablet 10 mg    umeclidinium (INCRUSE ELLIPTA) 62.5 mcg/actuation    budesonide (PULMICORT) 500 mcg/2 ml nebulizer suspension    albuterol (PROVENTIL VENTOLIN) nebulizer solution 2.5 mg    nicotine (NICODERM CQ) 7 mg/24 hr patch 1 Patch    sodium chloride (NS) flush 5-10 mL    sodium chloride (NS) flush 5-10 mL    acetaminophen (TYLENOL) tablet 650 mg    ondansetron (ZOFRAN) injection 4 mg    enoxaparin (LOVENOX) injection 40 mg    0.9% sodium chloride infusion    aspirin chewable tablet 81 mg    klack's mouthwash oral suspension (COMPOUNDED)    diazePAM (VALIUM) tablet 5 mg    diazePAM (VALIUM) tablet 10 mg    folic acid (FOLVITE) tablet 1 mg    thiamine (B-1) tablet 100 mg    therapeutic multivitamin (THERAGRAN) tablet 1 Tab        PMH:  has a past medical history of Arthritis; Asthma (2010); B12 deficiency (2016); [facility] COPD (chronic obstructive pulmonary disease) ([facility] [address] (2010); Hyperlipidemia (2010); Hypertension (2010); Other ill-defined conditions(799.89) (10/31/13); and Other ill-defined conditions(799.89). He also has no past medical history of Adverse effect of anesthesia; Difficult intubation; Malignant hyperthermia due to anesthesia; Nausea & vomiting; or Pseudocholinesterase deficiency. PSH:   has a past surgical history that includes lumbar laminectomy (); heent; gi; hip replacement (Right, ); hip replacement (2014); other surgical; orthopaedic (25 years ago); total hip arthroplasty (Right, 2013); orthopaedic; and other surgical.   FHX: family history includes Diabetes in his father; Hypertension in his mother; Lung Disease in his mother; Obesity in his sister. There is no history of Anesth Problems. SHX:  reports that he has been smoking Cigarettes. He has a 7.50 pack-year smoking history. He has never used smokeless tobacco. He reports that he drinks about 12.0 oz of alcohol per week  He reports that he does not use illicit drugs. ROS:A comprehensive review of systems was negative except for that written in the HPI.       Objective:     Vital Signs: Intake/Output: Intake/Output:   Visit Vitals    /70    Pulse 86    Temp 97.8 °F (36.6 °C)    Resp 28    Ht 6' (1.829 m)    Wt 77.1 kg (170 lb)    SpO2 99%    BMI 23.06 kg/m2           O2 Device: Room air     Wt Readings from Last 4 Encounters:   17 77.1 kg (170 lb)   10/09/17 71.2 kg (157 lb)   17 71.5 kg (157 lb 9.6 oz)   17 73 kg (160 lb 15 oz)       Temp (24hrs), Av.8 °F (36.6 °C), Min:97.5 °F (36.4 °C), Max:98.1 °F (36.7 °C)     Intake/Output Summary (Last 24 hours) at 11/08/17 0923  Last data filed at 11/08/17 0120   Gross per 24 hour   Intake              800 ml   Output              300 ml   Net              500 ml     Last shift:         Last 3 shifts: 11/06 1901 - 11/08 0700  In: 800 [P.O.:800]  Out: 1050 [Urine:1050]     Exam:    Physical Exam:    General: WDWNWM  in no respiratory distress and acyanotic, alert, oriented times 3, cooperative and moderately ill   HEENT: NCAT; No Thrush; class 4 airway   Neck: No abnormally enlarged lymph nodes. Chest: increased AP diameter   Lungs: distant BS, no wheeze today   Heart: Regular rate and rhythm   Abdomen: soft, non-tender, without masses or organomegaly   :    Extremity: negative, cyanosis    Neuro: alert   Psych: oriented to time, place and person   Skin: Skin unremarkable;    Pulses:Bilateral, Radial, 2+   Capillary refill:  well perfused, brisk capillary refill,    Data:     Interval lab and diagnostic data was reviewed. Interval radiology images were independently viewed and available reports were reviewed. All lab results for the last 24 hours reviewed.              Labs:    Recent Labs      11/06/17 0527 11/05/17   1509   WBC  5.0  9.0   HGB  12.0*  14.7   PLT  171  226     Recent Labs      11/06/17 0527 11/05/17 2256 11/05/17 1922 11/05/17   1509   NA  134*   --    --   131*   K  3.8   --    --   4.4   CL  102   --    --   94*   CO2  22   --    --   27   GLU  233*   --    --   73   BUN  14   --    --   10   CREA  1.05   --    --   1.12   CA  7.9*   --    --   9.3   MG  1.7   --    --    --    PHOS  2.8   --    --    --    LAC   --   1.4  2.9*  3.9*   ALB  2.6*   --    --   3.6   SGOT  17   --    --   28   ALT  25   --    --   34     Recent Labs      11/05/17   1845   PH  7.41   PCO2  37   PO2  86   HCO3  23     Recent Labs      11/06/17 0527 11/05/17 2256 11/05/17   1509   CPK   --    --   75   CKNDX   --    --   3.2*   TROIQ  <0.04  <0.04  <0.04     No results found for: BNPP, BNP Imaging:  I have personally reviewed the patients radiographs and have reviewed the reports:          Results from Hospital Encounter encounter on 11/05/17   XR CHEST PORT   Narrative PORTABLE CHEST RADIOGRAPH/S: 11/5/2017 3:59 PM    INDICATION: Chest pain. Weakness walking last week, with legs giving out,  dizziness, fatigue. COMPARISON: 10/9/2017, 7/11/2017, 10/21/2016. CT chest 10/9/2017. TECHNIQUE: Portable frontal semiupright radiograph/s of the chest.    FINDINGS:   Bullous emphysema in the left lower lobe causes chronic atelectasis/scarring  superior to it. There is left paratracheal paraseptal emphysema as well. No  superimposed acute airspace consolidation. Wedge resection staple line in the  right midlung. A right IJ ported catheter terminates in the right atrium. No  pneumothorax and no large pleural effusion. Chronic blunting of the left  costophrenic angle. Impression IMPRESSION:   No acute disease in the chest. Emphysema. Results from [place] encounter on 11/05/17   CTA CHEST W OR W WO CONT   Narrative CT ANGIOGRAPHY CHEST. 11/5/2017 4:42 PM     INDICATION: Lung carcinoma, worsening dyspnea. COMPARISON: 10/9/2017, 1/29/2016. TECHNIQUE: CT angiography of the chest was performed after the administration of  100 cc IV contrast (Isovue 370). Coronal and sagittal, and coronal MIP  reconstructions were performed. CT dose reduction was achieved through use of a  standardized protocol tailored for this examination and automatic exposure  control for dose modulation. FINDINGS:  Bullous emphysema in the left lower lobe causes chronic atelectasis/scarring  superior to it. There is bullous paraseptal emphysema along the anterior  junction line bilaterally in the upper lobes. There is a wedge resection staple  line along the major fissure in the right lung. A mildly enlarged left hilar  lymph node measures 15 mm in short axis.  There is chronic left pleural  thickening with minimal calcification. There is no pulmonary embolism. The heart  size is normal. Three-vessel coronary artery calcifications are moderate. A  right IJ ported catheter terminates in the right atrium. The visualized upper  abdomen is normal.         Impression IMPRESSION:   1. Mildly enlarged left hilar lymph node. 2. Left lower lobe bullous emphysema associated with scarring. My assessment/plan was discussed with:  nursing    respiratory therapy Dr. crystal      Complex decision making was performed which includes reviewing the patient's past medical records, current laboratory results, medications, ECG and actual Xray films, immediately available at the bedside to the patient    Thank you for allowing us to participate in the care of this patient. We will be happy to follow with you.     Keke Gaitan MD

## 2017-11-08 NOTE — PROGRESS NOTES
S: Mr. Erwin Fischer was seen by me today during rounds. At this time, he is resting in bed. He is still SOB. Also complains of hearing loss, and is shouting. Generalized weakness. Able to walk only a few steps to chair with PT.  ROS otherwise unremarkable except as noted elsewhere. O: Blood pressure 147/70, pulse 86, temperature 97.8 °F (36.6 °C), resp. rate 28, height 6' (1.829 m), weight 170 lb (77.1 kg), SpO2 99 %. Gen: Patient is somewhat tachypnic. Lungs: Exp wheezing noted. Heart: RRR. Abd: S, NT, ND, BS present. Extremities: Warm. No results found for this or any previous visit (from the past 12 hour(s)). CT Head: No change. No acute abnormality identified    CTA chest:   1. Mildly enlarged left hilar lymph node. 2. Left lower lobe bullous emphysema associated with scarring. Lumbar Spine MRI:   No evidence of osseous metastatic disease. No canal or foraminal stenosis. Mild effacement of nerve roots extending to the left S1 foramen. Echo:  Left ventricle: Systolic function was normal. Ejection fraction was  estimated to be 65 %. There were no regional wall motion abnormalities. A / P:   1. Acute respiratory distress (11/5/2017): On oxygen as needed, other measures as below. Pulmonology following. 2. COPD exacerbation ([facility] [address] (11/5/2017): Scheduled nebs, IV solumedrol. 3. Leg weakness: Neuro work up in progress. Able to ambulate now, but limited by respiratory status. 4. SCLC, unresectable: Opdivo on hold for last few weeks on fear that it is exacerbating his COPD.   5. Elevated lactic acid. 6. Hearing loss: ?Due to chemo. 7. Hypertension: Controlled. 8. Falls/Deconditioning: PT / OT as breathing improves. 9. ETOH: CIWA protocol. 10. Mucocitis  11.  Code: Partial.   12. D/C planning: TBD

## 2017-11-08 NOTE — PROGRESS NOTES
ADULT PROTOCOL: JET AEROSOL  REASSESSMENT    Patient  Minerva Phillip     63 y.o.   male     11/7/2017  8:43 PM    Breath Sounds Pre Procedure: Right Breath Sounds: Diminished                               Left Breath Sounds: Diminished    Breath Sounds Post Procedure: Right Breath Sounds: Diminished                                 Left Breath Sounds: Diminished    Breathing pattern: Pre procedure Breathing Pattern: Regular          Post procedure Breathing Pattern: Regular    Heart Rate: Pre procedure Pulse: 76           Post procedure Pulse: 74    Resp Rate: Pre procedure Respirations: 22           Post procedure Respirations: 22    Peak Flow: Pre bronchodilator   n/a          Post bronchodilator   n/a    FVC/FEV1: n/a    Incentive Spirometry:   n/a      n/a    Cough: Pre procedure Cough: Non-productive               Post procedure Cough: Non-productive    Suctioned: NO    Sputum: Pre procedure                   Post procedure      Oxygen: O2 Device: Room air   FiO2 (%) rm air 21%     Changed: NO    SpO2: Pre procedure SpO2: 98 %   without oxygen              Post procedure SpO2: 99 %  without oxygen    Nebulizer Therapy: Current medications Aerosolized Medications: Albuterol, Ipratropium bromide, Pulmicort      Changed: NO    Smoking History: current smoker, wearing nicotine patch here in hospital    Problem List:   Patient Active Problem List   Diagnosis Code    Hypertension I10    COPD (chronic obstructive pulmonary disease) ([facility]) J44.9    Asthma J45.909    Hyperlipidemia E78.5    Hyponatremia E87.1    Acute renal failure ([facility]) N17.9    Osteoarthritis of right hip M16.11    DJD (degenerative joint disease) of hip M16.9    Esophageal obstruction due to food impaction K22.2, A38.014Q    B12 deficiency E53.8    S/P bronchoscopy with biopsy Z98.890    Chronic coronary artery disease I25.10    Gastroesophageal reflux disease with esophagitis K21.0    Cancer of lung ([facility]) C34.90    [facility] C34.90    Acute respiratory distress R06.03    [facility] J44.1       Respiratory Therapist: Catherine Mcgregor RT

## 2017-11-08 NOTE — PROGRESS NOTES
Problem: Falls - Risk of  Goal: *Absence of Falls  Document Robert Fall Risk and appropriate interventions in the flowsheet.    Outcome: Progressing Towards Goal  Fall Risk Interventions:  Mobility Interventions: Communicate number of staff needed for ambulation/transfer         Medication Interventions: Evaluate medications/consider consulting pharmacy    Elimination Interventions: Patient to call for help with toileting needs    History of Falls Interventions: Evaluate medications/consider consulting pharmacy

## 2017-11-09 NOTE — PROGRESS NOTES
Oncology Nursing Communication Tool  7:39 AM  11/9/2017     Bedside shift change report given to TYE Yuan (incoming nurse) by Lonie Schwab, RN (outgoing nurse) on Chuy Zuniga. Report included the following information SBAR, Kardex, MAR, Accordion and Recent Results. Significant changes during shift: None      Issues for physician to address: None         Oncology Shift Note   Admission Date 11/5/2017   Admission Diagnosis COPD exacerbation ([facility] [address]  Acute respiratory distress   Code Status Partial Code   Consults IP CONSULT TO PULMONOLOGY      Cardiac Monitoring [x] Yes [] No      Purposeful Hourly Rounding [x] Yes    Robert Score Total Score: 4   Robert score 3 or > [] Bed Alarm [] Avasys [] 1:1 sitter [] Patient refused (Place signed refusal form in chart)      Pain Managed [x] Yes [] No    Key Pain Meds             aspirin (ASPIRIN) 325 mg tablet  (Taking) Take 81 mg by mouth daily. Influenza Vaccine Received Flu Vaccine for Current Season (usually Sept-March): Yes (august 2017)           Oxygen needs? [x] Room air Oxygen @  []1L    []2L    []3L   []4L    []5L   []6L     Use home O2? [] Yes [] No  Perform O2 challenge test using  smartphrase (.Homeoxygen)      Last bowel movement Last Bowel Movement Date: 11/05/17      Urinary Catheter             LDAs             Venous Access Device right chest [facility] 11/05/17 Upper chest (subclavicular area, right (Active)   Central Line Being Utilized Yes 11/9/2017  3:03 AM   Criteria for Appropriate Use Limited/no vessel suitable for conventional peripheral access 11/9/2017  3:03 AM   Site Assessment Clean, dry, & intact 11/9/2017  3:03 AM   Date of Last Dressing Change 11/05/17 11/9/2017  3:03 AM   Dressing Status Clean, dry, & intact 11/9/2017  3:03 AM   Dressing Type Disk with Chlorhexadine gluconate (CHG); Transparent 11/9/2017  3:03 AM   Action Taken Blood drawn 11/6/2017  5:22 AM   Date Accessed ([facility]) 11/05/17 11/9/2017  3:03 AM Access Time (Medial Site) 2255 11/5/2017 10:59 PM   Access Needle Size (Site #1) 20 G 11/9/2017  3:03 AM   Access Needle Length (Medial Site) 1 inch 11/9/2017  3:03 AM   Positive Blood Return (Medial Site) Yes 11/9/2017  3:03 AM   Action Taken (Medial Site) Infusing 11/9/2017  3:03 AM      Peripheral IV 11/05/17 Left Antecubital (Active)   Site Assessment Clean, dry, & intact 11/9/2017  3:03 AM   Phlebitis Assessment 0 11/9/2017  3:03 AM   Infiltration Assessment 0 11/9/2017  3:03 AM   Dressing Status Clean, dry, & intact 11/9/2017  3:03 AM   Dressing Type Transparent;Tape 11/9/2017  3:03 AM   Hub Color/Line Status Pink;Capped 11/9/2017  3:03 AM   Alcohol Cap Used Yes 11/5/2017  3:16 PM                         Readmission Risk Assessment Tool Score Low Risk            8       Total Score        3 Has Seen PCP in Last 6 Months (Yes=3, No=0)    2 . Living with Significant Other. Assisted Living. LTAC. SNF. or   Rehab    3 Charlson Comorbidity Score (Age + Comorbid Conditions)        Criteria that do not apply:    Patient Length of Stay (>5 days = 3)    IP Visits Last 12 Months (1-3=4, 4=9, >4=11)    Pt. Coverage (Medicare=5 , Medicaid, or Self-Pay=4)       Expected Length of Stay 3d 2h   Actual Length of Stay 4          Opportunity for questions and clarifications were given to the incoming nurse. Patient's bed is in low position, side rails x2, door open PRN, call bell within reach and patient not in distress.       Mi Baldwin RN

## 2017-11-09 NOTE — PROGRESS NOTES
Nutrition LOS Screen  LOS: 4    Assessment:  Chart reviewed and pt discussed on daily rounds. Pt reports he is eating well.  Will conduct a full assessment in 3-5 days    Past Medical History:   Diagnosis Date    Arthritis     Asthma 5/21/2010    B12 deficiency 2/19/2016    Cancer ([facility] [address]     Lung    COPD (chronic obstructive pulmonary disease) ([facility] [address] 5/21/2010    asthma, uses inhalers    Hyperlipidemia 5/21/2010    Hypertension 5/21/2010    Other ill-defined conditions(799.89) 10/31/13    CURRENTLY BEING TRX FOR COLD    Other ill-defined conditions(799.89)     AVASCULAR NECROSIS OF HIPS/ bilateral hip replacement        Diet Order:      % Eaten:  Patient Vitals for the past 72 hrs:   % Diet Eaten   11/07/17 1135 100 %   11/06/17 1814 75 %   11/06/17 1330 75 %     Pertinent Medications: [x]Reviewed: folic acid, lisinopril, solumedrol,   Pertinent Labs: [x]Reviewed: low B12, MVI, thiamine  Food Allergies: [x]NKFA  []Other   Last BM: 11/5  Edema:        []RUE   []LUE   []RLE   []LLE      Pressure Ulcer:      [] Stage I   [] Stage II   [] Stage III   [] Stage IV      Wt Readings from Last 30 Encounters:   11/05/17 77.1 kg (170 lb)   10/09/17 71.2 kg (157 lb)   08/04/17 71.5 kg (157 lb 9.6 oz)   07/29/17 73 kg (160 lb 15 oz)   05/08/17 77.2 kg (170 lb 3.2 oz)   07/26/16 73 kg (161 lb)   07/01/16 71.9 kg (158 lb 8 oz)   06/27/16 67.1 kg (148 lb)   04/20/16 77.1 kg (170 lb)   04/12/16 78.5 kg (173 lb)   03/28/16 75.8 kg (167 lb 3 oz)   03/15/16 78.1 kg (172 lb 3.2 oz)   03/10/16 80.7 kg (177 lb 14.6 oz)   02/23/16 80.3 kg (177 lb)   02/19/16 80.8 kg (178 lb 3.2 oz)   10/16/15 79.1 kg (174 lb 6.4 oz)   08/18/15 77.6 kg (171 lb 1.2 oz)   06/12/15 83.9 kg (185 lb)   02/06/15 80.7 kg (178 lb)   11/24/14 79.8 kg (176 lb)   11/21/14 79.7 kg (175 lb 9.6 oz)   11/18/14 80 kg (176 lb 6 oz)   09/09/14 83.1 kg (183 lb 3.2 oz)   04/11/14 83.5 kg (184 lb)   11/13/13 84.4 kg (186 lb)   10/31/13 84.4 kg (186 lb)   10/29/13 87.1 kg (192 lb)   08/05/13 87.8 kg (193 lb 8 oz)   03/04/13 92.1 kg (203 lb)   09/24/12 83 kg (183 lb)       Anthropometrics:   Height: 6' (182.9 cm) Weight: 77.1 kg (170 lb)     BMI: Body mass index is 23.06 kg/(m^2).     This BMI is indicative of:   []Underweight    [x]Normal    []Overweight    [] Obesity   [] Extreme Obesity (BMI>40)       Katarina Kumar RDN  Pager: [phone]

## 2017-11-09 NOTE — PROGRESS NOTES
S: Mr. Leda Shane was seen by me today during rounds. At this time, he is resting in bed. He is still SOB. Also complains of hearing loss, and is shouting. Generalized weakness. Able to walk only a few steps to chair with PT.  ROS otherwise unremarkable except as noted elsewhere. O: Blood pressure (!) 157/4, pulse 75, temperature 97.5 °F (36.4 °C), resp. rate 20, height 6' (1.829 m), weight 170 lb (77.1 kg), SpO2 98 %. Gen: Patient is somewhat tachypnic. Lungs: Exp wheezing noted. Heart: RRR. Abd: S, NT, ND, BS present. Extremities: Warm. Recent Results (from the past 12 hour(s))   CBC WITH AUTOMATED DIFF    Collection Time: 11/09/17  5:38 AM   Result Value Ref Range    WBC 7.4 4.1 - 11.1 K/uL    RBC 3.21 (L) 4.10 - 5.70 M/uL    HGB 10.7 (L) 12.1 - 17.0 g/dL    HCT 31.0 (L) 36.6 - 50.3 %    MCV 96.6 80.0 - 99.0 FL    MCH 33.3 26.0 - 34.0 PG    MCHC 34.5 30.0 - 36.5 g/dL    RDW 13.6 11.5 - 14.5 %    PLATELET 198 975 - 023 K/uL    NEUTROPHILS 92 (H) 32 - 75 %    LYMPHOCYTES 4 (L) 12 - 49 %    MONOCYTES 4 (L) 5 - 13 %    EOSINOPHILS 0 0 - 7 %    BASOPHILS 0 0 - 1 %    ABS. NEUTROPHILS 6.8 1.8 - 8.0 K/UL    ABS. LYMPHOCYTES 0.3 (L) 0.8 - 3.5 K/UL    ABS. MONOCYTES 0.3 0.0 - 1.0 K/UL    ABS. EOSINOPHILS 0.0 0.0 - 0.4 K/UL    ABS. BASOPHILS 0.0 0.0 - 0.1 K/UL    RBC COMMENTS NORMOCYTIC, NORMOCHROMIC      DF SMEAR SCANNED     METABOLIC PANEL, BASIC    Collection Time: 11/09/17  5:38 AM   Result Value Ref Range    Sodium 139 136 - 145 mmol/L    Potassium 4.2 3.5 - 5.1 mmol/L    Chloride 106 97 - 108 mmol/L    CO2 28 21 - 32 mmol/L    Anion gap 5 5 - 15 mmol/L    Glucose 120 (H) 65 - 100 mg/dL    BUN 19 6 - 20 MG/DL    Creatinine 0.98 0.70 - 1.30 MG/DL    BUN/Creatinine ratio 19 12 - 20      GFR est AA >60 >60 ml/min/1.73m2    GFR est non-AA >60 >60 ml/min/1.73m2    Calcium 7.8 (L) 8.5 - 10.1 MG/DL        CT Head: No change. No acute abnormality identified    CTA chest:   1.  Mildly enlarged left hilar lymph node.  2. Left lower lobe bullous emphysema associated with scarring. Lumbar Spine MRI:   No evidence of osseous metastatic disease. No canal or foraminal stenosis. Mild effacement of nerve roots extending to the left S1 foramen. Echo:  Left ventricle: Systolic function was normal. Ejection fraction was  estimated to be 65 %. There were no regional wall motion abnormalities. A / P:   1. Acute respiratory distress (11/5/2017): On oxygen as needed, other measures as below. Pulmonology following. 2. COPD exacerbation ([facility] [address] (11/5/2017): Scheduled nebs, IV solumedrol. 3. Leg weakness: Neuro work up in progress. Able to ambulate now, but limited by respiratory status. 4. Squamous Cell Lung Cancer, unresectable: Opdivo on hold for last few weeks on fear that it is exacerbating his COPD. His wife is concerned about the long time off chemo--I'll consult Dr. Cirilo Terrazas to review his case. 5. Elevated lactic acid. 6. Hearing loss: ?Due to chemo. 7. Hypertension: Controlled. 8. Falls/Deconditioning: PT / OT as breathing improves. 9. ETOH: CIWA protocol. 10. Mucositis  11. Code: Partial.   12. D/C planning: Inpatient Pulmonary Rehab if possible       Discussed case with his wife by phone. She is worried that he has been off chemo, and would like oncology consult to review things.

## 2017-11-09 NOTE — CONSULTS
Hematology Oncology Consultation    Reason for consult: NSCLC    Admitting Diagnosis: COPD exacerbation ([address]  Acute respiratory distress    Impression: With regard to his NSCLC, it does seem from the last two chest Ct scans, that there has been minimal change in his lung cancer and that the Mona Senior has indeed stabilized his disease. Ideally he would start back on it. At present, however, his high dose steroids will mean that the opdivo will not be very efficacious so I would prefer to delay re-starting it until he is on a somewhat lower dose of steroids. Opdivo stimulates the immune system and steroids tamp it down. . I am also concerned by the abrupt onset of his hearing difficulties 4-6 weeks ago. He was due for his followup visit with Dr. Donna Wilder Tuesday but missed the appointment because he was here. I will place consult for the followup evaluation - on the off chance that this is related to his immunotherapy, it would change the desirability of resuming it. He reports 4-5 episodes of lightheadedmess with subsequent legs giving way - not sure if this is more a neurologic issue or cardiac issue. Will defer to Dr. Tj Marinelli as to which way to proceed with his evaluation. Discussion: Erwin Fischer is a  63y.o. year old seen in consultation at the request of Dr. Tj Marinelli for [facility]. followup. Mr. Roger Andres was initially diagnosed with NSCLC in March of 2016. He underwent VATS and wedge resection of his RUL nodule. He was then treated with abraxane and carboplatin 25/16 through 6/23/16 but his counts kept dropping so he was switched to carboplatin and taxol but reacted severely to it and it was discontinued. He was switched to opdivo (nivolumab) and has received 29 cycles since 7/18/16 with his last dose of 10/17/17 held due to concerns about his breathing and possible relationship to the Opdivo. He reports that he was started on vibramycin and prednisone 5 mg po bid as an outpatient.  His respiratory status worsened further and he fell on his patio after getting lightheaded. His wife was appropriately concerned and brought him to the ER. He has fallen more than once and tells me, he gets light headed before the actual fall. His hearing got \"bad\" 4-6 weeks ago. He saw Dr. Dalia Phillips about this and was given a 12 day course of steroids without improvement. He has pain in his back that occasionally radiates down the right leg. He was told he has arthritis in his back causing his sciatica. Imaging:    CT scan chest. 11/5/2017 4:42 PM      INDICATION: Lung carcinoma, worsening dyspnea.     COMPARISON: 10/9/2017, 1/29/2016.     TECHNIQUE: CT angiography of the chest was performed after the administration of  100 cc IV contrast (Isovue 370). Coronal and sagittal, and coronal MIP  reconstructions were performed. CT dose reduction was achieved through use of a  standardized protocol tailored for this examination and automatic exposure  control for dose modulation.     FINDINGS:  Bullous emphysema in the left lower lobe causes chronic atelectasis/scarring  superior to it. There is bullous paraseptal emphysema along the anterior  junction line bilaterally in the upper lobes. There is a wedge resection staple  line along the major fissure in the right lung. A mildly enlarged left hilar  lymph node measures 15 mm in short axis. There is chronic left pleural  thickening with minimal calcification. There is no pulmonary embolism. The heart  size is normal. Three-vessel coronary artery calcifications are moderate. A  right IJ ported catheter terminates in the right atrium. The visualized upper  abdomen is normal.     IMPRESSION:   1. Mildly enlarged left hilar lymph node. 2. Left lower lobe bullous emphysema associated with scarring.     Labs:    Recent Results (from the past 24 hour(s))   CBC WITH AUTOMATED DIFF    Collection Time: 11/09/17  5:38 AM   Result Value Ref Range    WBC 7.4 4.1 - 11.1 K/uL    RBC 3.21 (L) 4.10 - 5.70 M/uL    HGB 10.7 (L) 12.1 - 17.0 g/dL    HCT 31.0 (L) 36.6 - 50.3 %    MCV 96.6 80.0 - 99.0 FL    MCH 33.3 26.0 - 34.0 PG    MCHC 34.5 30.0 - 36.5 g/dL    RDW 13.6 11.5 - 14.5 %    PLATELET 532 425 - 211 K/uL    NEUTROPHILS 92 (H) 32 - 75 %    LYMPHOCYTES 4 (L) 12 - 49 %    MONOCYTES 4 (L) 5 - 13 %    EOSINOPHILS 0 0 - 7 %    BASOPHILS 0 0 - 1 %    ABS. NEUTROPHILS 6.8 1.8 - 8.0 K/UL    ABS. LYMPHOCYTES 0.3 (L) 0.8 - 3.5 K/UL    ABS. MONOCYTES 0.3 0.0 - 1.0 K/UL    ABS. EOSINOPHILS 0.0 0.0 - 0.4 K/UL    ABS.  BASOPHILS 0.0 0.0 - 0.1 K/UL    RBC COMMENTS NORMOCYTIC, NORMOCHROMIC      DF SMEAR SCANNED     METABOLIC PANEL, BASIC    Collection Time: 11/09/17  5:38 AM   Result Value Ref Range    Sodium 139 136 - 145 mmol/L    Potassium 4.2 3.5 - 5.1 mmol/L    Chloride 106 97 - 108 mmol/L    CO2 28 21 - 32 mmol/L    Anion gap 5 5 - 15 mmol/L    Glucose 120 (H) 65 - 100 mg/dL    BUN 19 6 - 20 MG/DL    Creatinine 0.98 0.70 - 1.30 MG/DL    BUN/Creatinine ratio 19 12 - 20      GFR est AA >60 >60 ml/min/1.73m2    GFR est non-AA >60 >60 ml/min/1.73m2    Calcium 7.8 (L) 8.5 - 10.1 MG/DL       History:  Past Medical History:   Diagnosis Date    Arthritis     Asthma 5/21/2010    B12 deficiency 2/19/2016    Cancer ([facility] [address]     Lung    COPD (chronic obstructive pulmonary disease) ([facility] [address] 5/21/2010    asthma, uses inhalers    Hyperlipidemia 5/21/2010    Hypertension 5/21/2010    Other ill-defined conditions(799.89) 10/31/13    CURRENTLY BEING TRX FOR COLD    Other ill-defined conditions(799.89)     AVASCULAR NECROSIS OF HIPS/ bilateral hip replacement       Past Surgical History:   Procedure Laterality Date    HX GI      COLONOSCOPY    HX HEENT      WISDOM TEETH    HX HIP REPLACEMENT Right 2014    Dr. Milo Goodpasture HX HIP REPLACEMENT  11/2014    L hip    HX LUMBAR LAMINECTOMY  1990s    HX ORTHOPAEDIC  25 years ago    lumbar laminectomy    HX ORTHOPAEDIC      left hip replacement    HX OTHER SURGICAL      egd    HX OTHER SURGICAL bronchoscopy with biopsy    TOTAL HIP ARTHROPLASTY Right 11/2013      Prior to Admission medications    Medication Sig Start Date End Date Taking? Authorizing Provider   SPIRIVA WITH HANDIHALER 18 mcg inhalation capsule INHALE CONTENTS OF 1 CAPSULE THROUGH HANDIHALER DEVICE DAILY 9/25/17  Yes Kamille Denise MD   albuterol (PROVENTIL VENTOLIN) 2.5 mg /3 mL (0.083 %) nebulizer solution USE VIA NEBULIZER EVERY FOUR HOURS AS NEEDED FOR WHEEZING AS DIRECTED 9/14/17  Yes Kamille Denise MD   ERGOCALCIFEROL, VITAMIN D2, (VITAMIN D2 PO) Take 1.25 mg by mouth. Once per week   Yes Historical Provider   NIVOLUMAB (OPDIVO IV) by IntraVENous route. Yes Tarsha Hurtado MD   aspirin (ASPIRIN) 325 mg tablet Take 81 mg by mouth daily. Yes Tarsha Hurtado MD   predniSONE (DELTASONE) 5 mg tablet Take  by mouth. Yes Historical Provider   lisinopril (PRINIVIL, ZESTRIL) 10 mg tablet Take 1 Tab by mouth daily. 5/8/17  Yes Kamille Denise MD   beclomethasone (QVAR) 80 mcg/actuation aero Take 2 Puffs by inhalation two (2) times a day. 5/8/17  Yes Kamille Denise MD   albuterol [facility] HFA) 90 mcg/actuation inhaler Take 2 Puffs by inhalation every four (4) hours as needed for Wheezing. 2/27/17  Yes Kamille Denise MD   gabapentin (NEURONTIN) 100 mg capsule Take 2 Caps by mouth two (2) times a day.  Indications: nerve damage 7/1/16  Yes Angela Vargas MD   OTHER Chemo treatment every Thursday   Yes Historical Provider   pantoprazole (PROTONIX) 40 mg tablet  3/14/16   Historical Provider     Allergies   Allergen Reactions    Amoxicillin Other (comments)     Headache    Hydrochlorothiazide Other (comments)     Hyponatremia      Social History   Substance Use Topics    Smoking status: Current Every Day Smoker     Packs/day: 0.25     Years: 30.00     Types: Cigarettes    Smokeless tobacco: Never Used    Alcohol use 12.0 oz/week     24 Cans of beer per week      Comment: per week      Family History   Problem Relation Age of Onset    Diabetes Father      father   [facility] Lung Disease Mother      COPD    Hypertension Mother    [address] [facility] Obesity Sister     Anesth Problems Neg Hx         Current Medications:  Current Facility-Administered Medications   Medication Dose Route Frequency    albuterol-ipratropium (DUO-NEB) 2.5 MG-0.5 MG/3 ML  3 mL Nebulization Q4H RT    aluminum & magnesium hydroxide-simethicone (MYLANTA II) oral suspension 30 mL  30 mL Oral Q4H PRN    guaiFENesin ER (MUCINEX) tablet 600 mg  600 mg Oral Q12H    methylPREDNISolone (PF) (SOLU-MEDROL) injection 40 mg  40 mg IntraVENous Q6H    gabapentin (NEURONTIN) capsule 200 mg  200 mg Oral BID    lisinopril (PRINIVIL, ZESTRIL) tablet 10 mg  10 mg Oral DAILY    umeclidinium (INCRUSE ELLIPTA) 62.5 mcg/actuation  1 Puff Inhalation DAILY    budesonide (PULMICORT) 500 mcg/2 ml nebulizer suspension  500 mcg Nebulization BID RT    albuterol (PROVENTIL VENTOLIN) nebulizer solution 2.5 mg  2.5 mg Nebulization Q4H PRN    nicotine (NICODERM CQ) 7 mg/24 hr patch 1 Patch  1 Patch TransDERmal DAILY    sodium chloride (NS) flush 5-10 mL  5-10 mL IntraVENous Q8H    sodium chloride (NS) flush 5-10 mL  5-10 mL IntraVENous PRN    acetaminophen (TYLENOL) tablet 650 mg  650 mg Oral Q4H PRN    ondansetron (ZOFRAN) injection 4 mg  4 mg IntraVENous Q4H PRN    enoxaparin (LOVENOX) injection 40 mg  40 mg SubCUTAneous Q24H    0.9% sodium chloride infusion  75 mL/hr IntraVENous CONTINUOUS    aspirin chewable tablet 81 mg  81 mg Oral DAILY    klack's mouthwash oral suspension (COMPOUNDED)  5 mL Oral QID    diazePAM (VALIUM) tablet 5 mg  5 mg Oral Q1H PRN    diazePAM (VALIUM) tablet 10 mg  10 mg Oral U1G PRN    folic acid (FOLVITE) tablet 1 mg  1 mg Oral DAILY    thiamine (B-1) tablet 100 mg  100 mg Oral DAILY    therapeutic multivitamin (THERAGRAN) tablet 1 Tab  1 Tab Oral DAILY         Review of Systems:  Constitutional No fevers, chills, night sweats, excessive fatigue or weight loss. Allergic/Immunologic No recent allergic reactions   Eyes No significant visual difficulties. No diplopia. ENMT Very hard of  hearing, no sore throat, no sinus drainage. Endocrine No hot flashes or night sweats. No cold intolerance, polyuria, or polydipsia   Hematologic/Lymphatic No easy bruising or bleeding. The patient denies any tender or palpable lymph nodes   Breasts No abnormal masses of breast, nipple discharge or pain. Respiratory dyspnea on exertion   Cardiovascular No anginal chest pain, irregular heart beat, tachycardia, palpitations or orthopnea. Gastrointestinal No nausea, vomiting, diarrhea, constipation, cramping, dysphagia, reflux, heartburn, GI bleeding, or early satiety. No change in bowel habits. Genitourinary (M) No hematuria, dysuria, increased frequency, urgency, hesitancy or incontinence. Musculoskeletal No joint pain, swelling or redness. No decreased range of motion. Integumentary No chronic rashes, inflammation, ulcerations, pruritus, petechiae, purpura, ecchymoses, or skin changes. Neurologic No headache, blurred vision,  No sensory problems. Reports generalized leg weakness perhaps more on the right? History very vague. Psychiatric No insomnia, depression, adrienne or mood swings. No psychotropic drugs. Physical Exam:  Constitutional Alert, cooperative, oriented. Mood and affect appropriate. Appears close to chronological age. Well nourished. Well developed. Head Normocephalic; no scars   Eyes Conjunctivae and sclerae are clear and without icterus. Pupils are round   ENMT Sinuses are nontender. No oral exudates, ulcers, masses, thrush or mucositis. Oropharynx clear. Tongue normal.   Neck Supple without masses or thyromegaly. No jugular venous distension. Hematologic/Lymphatic No petechiae or purpura. No tender or palpable lymph nodes noted. Respiratory Lungs with coarse breath sounds. Distant.     Cardiovascular Regular rate and rhythm of heart without murmurs, gallops or rubs. Chest / Line Site Chest is symmetric with no chest wall deformities. Abdomen Non-tender, non-distended, no masses, ascites or hepatosplenomegaly. Good bowel sounds. No guarding or rebound tenderness. No pulsatile masses. Musculoskeletal No tenderness or swelling, normal range of motion without obvious weakness. Extremities No visible deformities, no cyanosis, clubbing or edema. Skin No rashes, scars, or lesions suggestive of malignancy. No petechiae, purpura, or ecchymoses. No excoriations. Neurologic No sensory or motor deficit   Psychiatric Alert and oriented times three. Coherent speech. Verbalizes understanding of our discussions today.

## 2017-11-09 NOTE — PROGRESS NOTES
ADULT PROTOCOL: JET AEROSOL ASSESSMENT    Patient  Lilliam Wray     63 y.o.   male     11/9/2017  8:26 AM    Breath Sounds Pre Procedure: Right Breath Sounds: Diminished, Wheezing, Lower, Crackles                               Left Breath Sounds: Diminished, Wheezing, Lower, Crackles    Breath Sounds Post Procedure: Right Breath Sounds: Diminished, Wheezing, Lower, Crackles                                 Left Breath Sounds: Diminished, Wheezing, Lower, Crackles    Breathing pattern: Pre procedure Breathing Pattern: Tachypneic, Labored          Post procedure Breathing Pattern: Tachypneic, Labored    Heart Rate: Pre procedure Pulse: 80           Post procedure Pulse: 72    Resp Rate: Pre procedure Respirations: 32           Post procedure Respirations: 22            Cough: Pre procedure Cough: Non-productive               Post procedure Cough: Non-productive      Oxygen: O2 Device: Room air   room air     Changed: NO    SpO2: Pre procedure SpO2: 98 %   without oxygen              Post procedure SpO2: 98 %  without oxygen    Nebulizer Therapy: Current medications Aerosolized Medications: Albuterol, Ipratropium bromide, Pulmicort      Changed: YES/ Changed to Q4 DUO due to increased wheezing and distress.     Smoking History: current smoker    Problem List:   Patient Active Problem List   Diagnosis Code    Hypertension I10    [facility] J44.9    Asthma J45.909    Hyperlipidemia E78.5    Hyponatremia E87.1    Acute renal failure (HCC) N17.9    Osteoarthritis of right hip M16.11    DJD (degenerative joint disease) of hip M16.9    Esophageal obstruction due to food impaction K22.2, E52.767U    B12 deficiency E53.8    S/P bronchoscopy with biopsy Z98.890    Chronic coronary artery disease I25.10    Gastroesophageal reflux disease with esophagitis K21.0    Cancer of lung (HCC) C34.90    Lung cancer (HCC) C34.90    Acute respiratory distress R06.03    COPD exacerbation ([facility] J44.1       Respiratory Therapist: Jonah Ritchie, RT

## 2017-11-09 NOTE — PROGRESS NOTES
Referral to [facility] being evaluated for patient acceptance. Patient states his wife plans to meet with Dr. Kristen Zaldivar and another physician at 1:00PM today in his hospital room to discuss plan of care.     Anayeli Hughes, RN, BSN, ACM   - Medical Oncology  [phone]

## 2017-11-09 NOTE — PROGRESS NOTES
[facility] Consult Service Note  Pulmonary, Critical Care, and Sleep Medicine    Name: Davy Recio MRN: [id number]   : 1955 Hospital: [facility]   Date: 2017   Hospital Day: 5       Subjective/Interval History:   I have reviewed the flowsheet and previous days notes. Seen earlier today on rounds.  Feels no better but wheezes better on my exam. Mild cough, dry. No fever. Waiting for PT to work with him     Generalized weakness. Able to walk only a few steps to chair with PT.  ROS otherwise unremarkable except as noted elsewhere. IMPRESSION:   1. COPD exacerbation with severe inspiratory and expiratory wheezing. Severr Bullous emphysema -he has poor ventilatory reserve and his quality of life is severely affected by the severity of COPD. He will continue to require treatments at home and his potential for re-admission is very high;   2. Tobacco use- active  3. Squamous cell lung cancer, unresectable: Opdivo on hold for last few weeks on fear that it is exacerbating his COPD. Overall good response  4. Left hilar LN   5. Leg weakness: Neuro work up in progress. 6. Hypertension: Controlled. 7. Falls/Deconditioning: PT / OT as breathing improves. 8. ETOH: CIWA protocol. 9. Mucositis      RECOMMENDATIONS/PLAN:   1. Wean steroids  2. Pulmonary rehab- [facility] preferable  3. Mobilize  4. scheduled Nebs  5. CIWA  6. no indication for antibiotics at this time  7. D/c planning per Dr. Gilmer Faustin  8. nothiing to add, will see again prn- hope he improves more       Code: Partial Code      Risk of deterioration: medium   [x] High complexity decision making was performed    Subjective/Initial History:   I have reviewed the flowsheet and previous days notes. I was asked by Maverick Mcneal MD to see Davy Recio in consultation for a chief complaint of  COPD, SOB and wheezes.     Davy Recio is a 63 y.o. male with PMhx significant for lung CA, COPD, asthma, who presented by personal vehicle to the ED with cc of weakness and increased SOB. Pt was ambulating with his walker when his legs suddenly gave out on him. He was unable to stand up on his own and had to call for his daughter to help. Pt had a similar episode the other day and 3 weeks ago. Pt had increased SOB over the past 5-6 weeks from his baseline, which he states is from his lung CA, COPD and asthma. He was started on Opdivo ~1 month ago by Dr. Lawrence Woodson, oncology. Pt is not on home O2. He uses a home nebulizer TID. Pt also notes recently starting Tylenol Arthiritis for radicular back pain. He continues to smoke ~5-6 cigarettes per day and drinks. He denies fever, chills, CP.    CTA negative for PE. Marked wheezing and no congestion. No fever.     Allergies   Allergen Reactions    Amoxicillin Other (comments)     Headache    Hydrochlorothiazide Other (comments)     Hyponatremia        MAR reviewed and pertinent medications noted or modified as needed     Current Facility-Administered Medications   Medication    albuterol-ipratropium (DUO-NEB) 2.5 MG-0.5 MG/3 ML    aluminum & magnesium hydroxide-simethicone (MYLANTA II) oral suspension 30 mL    guaiFENesin ER (MUCINEX) tablet 600 mg    methylPREDNISolone (PF) (SOLU-MEDROL) injection 40 mg    gabapentin (NEURONTIN) capsule 200 mg    lisinopril (PRINIVIL, ZESTRIL) tablet 10 mg    umeclidinium (INCRUSE ELLIPTA) 62.5 mcg/actuation    budesonide (PULMICORT) 500 mcg/2 ml nebulizer suspension    albuterol (PROVENTIL VENTOLIN) nebulizer solution 2.5 mg    nicotine (NICODERM CQ) 7 mg/24 hr patch 1 Patch    sodium chloride (NS) flush 5-10 mL    sodium chloride (NS) flush 5-10 mL    acetaminophen (TYLENOL) tablet 650 mg    ondansetron (ZOFRAN) injection 4 mg    enoxaparin (LOVENOX) injection 40 mg    0.9% sodium chloride infusion    aspirin chewable tablet 81 mg    klack's mouthwash oral suspension (COMPOUNDED)  diazePAM (VALIUM) tablet 5 mg    diazePAM (VALIUM) tablet 10 mg    folic acid (FOLVITE) tablet 1 mg    thiamine (B-1) tablet 100 mg    therapeutic multivitamin (THERAGRAN) tablet 1 Tab        PMH:  has a past medical history of Arthritis; Asthma (5/21/2010); B12 deficiency (2/19/2016); Cancer [facility]); COPD (chronic obstructive pulmonary disease) ([facility] [address] (5/21/2010); Hyperlipidemia (5/21/2010); Hypertension (5/21/2010); Other ill-defined conditions(799.89) (10/31/13); and Other ill-defined conditions(799.89). He also has no past medical history of Adverse effect of anesthesia; Difficult intubation; Malignant hyperthermia due to anesthesia; Nausea & vomiting; or Pseudocholinesterase deficiency. PSH:   has a past surgical history that includes lumbar laminectomy (1990s); heent; gi; hip replacement (Right, 2014); hip replacement (11/2014); other surgical; orthopaedic (25 years ago); total hip arthroplasty (Right, 11/2013); orthopaedic; and other surgical.   FHX: family history includes Diabetes in his father; Hypertension in his mother; Lung Disease in his mother; Obesity in his sister. There is no history of Anesth Problems. SHX:  reports that he has been smoking Cigarettes. He has a 7.50 pack-year smoking history. He has never used smokeless tobacco. He reports that he drinks about 12.0 oz of alcohol per week  He reports that he does not use illicit drugs. ROS:A comprehensive review of systems was negative except for that written in the HPI.       Objective:     Vital Signs: Intake/Output: Intake/Output:   Visit Vitals    BP (!) 169/92 (BP 1 Location: Left arm, BP Patient Position: At rest)    Pulse 68    Temp 97.6 °F (36.4 °C)    Resp 20    Ht 6' (1.829 m)    Wt 77.1 kg (170 lb)    SpO2 98%    BMI 23.06 kg/m2           O2 Device: Room air     Wt Readings from Last 4 Encounters:   11/05/17 77.1 kg (170 lb)   10/09/17 71.2 kg (157 lb)   08/04/17 71.5 kg (157 lb 9.6 oz)   07/29/17 73 kg (160 lb 15 oz) Temp (24hrs), Av.7 °F (36.5 °C), Min:97.5 °F (36.4 °C), Max:97.8 °F (36.6 °C)     Intake/Output Summary (Last 24 hours) at 17 1530  Last data filed at 17 0544   Gross per 24 hour   Intake                0 ml   Output              500 ml   Net             -500 ml     Last shift:         Last 3 shifts:  1901 - 700  In: -   Out: 800 [Urine:800]     Exam:    Physical Exam:    General: WDWNWM  in no respiratory distress and acyanotic, alert, oriented times 3, cooperative and moderately ill   HEENT: NCAT; No Thrush; class 4 airway   Neck: No abnormally enlarged lymph nodes. Chest: increased AP diameter   Lungs: distant BS, no wheeze today   Heart: Regular rate and rhythm   Abdomen: soft, non-tender, without masses or organomegaly   :    Extremity: negative, cyanosis    Neuro: alert   Psych: oriented to time, place and person   Skin: Skin unremarkable;    Pulses:Bilateral, Radial, 2+   Capillary refill:  well perfused, brisk capillary refill,    Data:     Interval lab and diagnostic data was reviewed. Interval radiology images were independently viewed and available reports were reviewed. All lab results for the last 24 hours reviewed. Labs:    Recent Labs      17   0538   WBC  7.4   HGB  10.7*   PLT  203     Recent Labs      17   0538   NA  139   K  4.2   CL  106   CO2  28   GLU  120*   BUN  19   CREA  0.98   CA  7.8*     No results for input(s): PH, PCO2, PO2, HCO3, FIO2 in the last 72 hours. No results for input(s): CPK, CKNDX, TROIQ in the last 72 hours. No lab exists for component: CPKMB  No results found for: BNPP, BNP     Imaging:  I have personally reviewed the patients radiographs and have reviewed the reports:          Results from Hospital Encounter encounter on 17   XR CHEST PORT   Narrative PORTABLE CHEST RADIOGRAPH/S: 2017 3:59 PM    INDICATION: Chest pain.  Weakness walking last week, with legs giving out,  dizziness, fatigue. COMPARISON: 10/9/2017, 7/11/2017, 10/21/2016. CT chest 10/9/2017. TECHNIQUE: Portable frontal semiupright radiograph/s of the chest.    FINDINGS:   Bullous emphysema in the left lower lobe causes chronic atelectasis/scarring  superior to it. There is left paratracheal paraseptal emphysema as well. No  superimposed acute airspace consolidation. Wedge resection staple line in the  right midlung. A right IJ ported catheter terminates in the right atrium. No  pneumothorax and no large pleural effusion. Chronic blunting of the left  costophrenic angle. Impression IMPRESSION:   No acute disease in the chest. Emphysema. Results from [place] encounter on 11/05/17   CTA CHEST W OR W WO CONT   Narrative CT ANGIOGRAPHY CHEST. 11/5/2017 4:42 PM     INDICATION: Lung carcinoma, worsening dyspnea. COMPARISON: 10/9/2017, 1/29/2016. TECHNIQUE: CT angiography of the chest was performed after the administration of  100 cc IV contrast (Isovue 370). Coronal and sagittal, and coronal MIP  reconstructions were performed. CT dose reduction was achieved through use of a  standardized protocol tailored for this examination and automatic exposure  control for dose modulation. FINDINGS:  Bullous emphysema in the left lower lobe causes chronic atelectasis/scarring  superior to it. There is bullous paraseptal emphysema along the anterior  junction line bilaterally in the upper lobes. There is a wedge resection staple  line along the major fissure in the right lung. A mildly enlarged left hilar  lymph node measures 15 mm in short axis. There is chronic left pleural  thickening with minimal calcification. There is no pulmonary embolism. The heart  size is normal. Three-vessel coronary artery calcifications are moderate. A  right IJ ported catheter terminates in the right atrium. The visualized upper  abdomen is normal.         Impression IMPRESSION:   1.  Mildly enlarged left hilar lymph node.  2. Left lower lobe bullous emphysema associated with scarring. My assessment/plan was discussed with:  nursing    respiratory therapy Dr. crystal      Complex decision making was performed which includes reviewing the patient's past medical records, current laboratory results, medications, ECG and actual Xray films, immediately available at the bedside to the patient    Thank you for allowing us to participate in the care of this patient. We will be happy to follow with you.     Yasmin Smith MD

## 2017-11-09 NOTE — PROGRESS NOTES
Oncology Interdisciplinary rounds were held today to discuss patient plan of care and outcomes. The following members were present: Nursing and Case Management.     Plan of Care:  PT/OT    Discharge Disposition: [facility] possibly

## 2017-11-09 NOTE — PROGRESS NOTES
Oncology Nursing Communication Tool  3:38 PM  11/9/2017     Bedside and Verbal shift change report given to Vickie Spear RN (incoming nurse) by Vanita Rosa RN (outgoing nurse) on [facility]. Report included the following information SBAR, Kardex, Intake/Output, MAR and Recent Results. Significant changes during shift: None      Issues for physician to address: None         Oncology Shift Note   Admission Date 11/5/2017   Admission Diagnosis COPD exacerbation ([facility] [address]  Acute respiratory distress   Code Status Partial Code   Consults IP CONSULT TO PULMONOLOGY  IP CONSULT TO HEMATOLOGY      Cardiac Monitoring [x] Yes [] No      Purposeful Hourly Rounding [x] Yes    Robert Score Total Score: 4   Robert score 3 or > [] Bed Alarm [] Avasys [] 1:1 sitter [] Patient refused (Place signed refusal form in chart)      Pain Managed [x] Yes [] No    Key Pain Meds             aspirin (ASPIRIN) 325 mg tablet  (Taking) Take 81 mg by mouth daily. Influenza Vaccine Received Flu Vaccine for Current Season (usually Sept-March): Yes (august 2017)           Oxygen needs?  [x] Room air Oxygen @  []1L    []2L    []3L   []4L    []5L   []6L     Use home O2? [] Yes [x] No  Perform O2 challenge test using  smartphrase (.Homeoxygen)      Last bowel movement Last Bowel Movement Date: 11/05/17      Urinary Catheter             LDAs             Venous Access Device right chest [facility] 11/05/17 Upper chest (subclavicular area, right (Active)   Central Line Being Utilized Yes 11/9/2017  7:31 AM   Criteria for Appropriate Use Limited/no vessel suitable for conventional peripheral access 11/9/2017  7:31 AM   Site Assessment Clean, dry, & intact 11/9/2017  7:31 AM   Date of Last Dressing Change 11/05/17 11/9/2017  7:31 AM   Dressing Status Clean, dry, & intact 11/9/2017  7:31 AM   Dressing Type Disk with Chlorhexadine gluconate (CHG) 11/9/2017  7:31 AM   Action Taken Blood drawn 11/6/2017  5:22 AM   Date Accessed ([facility]) 11/05/17 11/9/2017  7:31 AM   Access Time (Medial Site) 2255 11/5/2017 10:59 PM   Access Needle Size (Site #1) 20 G 11/9/2017  7:31 AM   Access Needle Length (Medial Site) 1 inch 11/9/2017  7:31 AM   Positive Blood Return (Medial Site) Yes 11/9/2017  7:31 AM   Action Taken (Medial Site) Infusing 11/9/2017  3:03 AM      Peripheral IV 11/05/17 Left Antecubital (Active)   Site Assessment Clean, dry, & intact 11/9/2017  7:31 AM   Phlebitis Assessment 0 11/9/2017  7:31 AM   Infiltration Assessment 0 11/9/2017  7:31 AM   Dressing Status Clean, dry, & intact 11/9/2017  7:31 AM   Dressing Type Tape;Transparent 11/9/2017  7:31 AM   Hub Color/Line Status Pink;Capped 11/9/2017  7:31 AM   Alcohol Cap Used Yes 11/5/2017  3:16 PM                         Readmission Risk Assessment Tool Score Low Risk            8       Total Score        3 Has Seen PCP in Last 6 Months (Yes=3, No=0)    2 . Living with Significant Other. Assisted Living. LTAC. SNF. or   Rehab    3 Charlson Comorbidity Score (Age + Comorbid Conditions)        Criteria that do not apply:    Patient Length of Stay (>5 days = 3)    IP Visits Last 12 Months (1-3=4, 4=9, >4=11)    Pt. Coverage (Medicare=5 , Medicaid, or Self-Pay=4)       Expected Length of Stay 3d 2h   Actual Length of Stay 4          Opportunity for questions and clarifications were given to the incoming nurse. Patient's bed is in low position, side rails x2, door open PRN, call bell within reach and patient not in distress.       Carolann Johnson RN

## 2017-11-09 NOTE — PROGRESS NOTES
Problem: Falls - Risk of  Goal: *Absence of Falls  Document Robert Fall Risk and appropriate interventions in the flowsheet.    Outcome: Progressing Towards Goal  Fall Risk Interventions:  Mobility Interventions: Patient to call before getting OOB         Medication Interventions: Bed/chair exit alarm, Evaluate medications/consider consulting pharmacy, Patient to call before getting OOB    Elimination Interventions: Call light in reach, Toileting schedule/hourly rounds    History of Falls Interventions: Bed/chair exit alarm, Door open when patient unattended

## 2017-11-09 NOTE — PROGRESS NOTES
Problem: Mobility Impaired (Adult and Pediatric)  Goal: *Acute Goals and Plan of Care (Insert Text)  Physical Therapy Goals  Initiated 11/7/2017  1. Patient will move from supine to sit and sit to supine , scoot up and down and roll side to side in bed with independence within 7 day(s). 2.  Patient will transfer from bed to chair and chair to bed with independence using the least restrictive device within 7 day(s). 3.  Patient will perform sit to stand with independence within 7 day(s). 4.  Patient will ambulate with modified independence for 100 feet with the least restrictive device within 7 day(s). physical Therapy TREATMENT  Patient: Bennett Baldwin (54 y.o. male)  Date: 11/9/2017  Diagnosis: COPD exacerbation ([facility] [address]  Acute respiratory distress <principal problem not specified>       Precautions: Fall  Chart, physical therapy assessment, plan of care and goals were reviewed. ASSESSMENT:  Pt cleared by nurse to Joey Gotti. Pt received in bed side lying. Pt agreeable to getting up in chair. Pt performed  bed mobility at supervision. Pt HR once sitting EOB at 89 o2 stats stable. Pt performed sit to stand transfer at [facility]. Pt took a few steps to chair with HHA. Once pt was in chair HR up to 105 o2 stats still stable. Pts RR increased not as fast as yesterdays session. Pt reported feeling fatigued and agreeable to sitting up in chair for 30 mins. Pt will benfit from Cardiopulmonary rehab to improve endurance and strength. Progression toward goals:  []      Improving appropriately and progressing toward goals  [x]      Improving slowly and progressing toward goals  []      Not making progress toward goals and plan of care will be adjusted     PLAN:  Patient continues to benefit from skilled intervention to address the above impairments. Continue treatment per established plan of care.   Discharge Recommendations:  Cardiopulmonary rehab   Further Equipment Recommendations for Discharge:  TBD by rehab SUBJECTIVE:   Patient stated That wore me out.     OBJECTIVE DATA SUMMARY:   Critical Behavior:  Neurologic State: Alert, Appropriate for age  Orientation Level: Oriented X4  Cognition: Appropriate decision making, Appropriate for age attention/concentration, Appropriate safety awareness, Follows commands  Safety/Judgement: Awareness of environment, Fall prevention, Home safety  Functional Mobility Training:  Bed Mobility:  Rolling: Supervision  Supine to Sit: Supervision               Transfers:  Sit to Stand: Contact guard assistance  Stand to Sit: Contact guard assistance                             Balance:  Sitting: Intact  Standing: Impaired; With support  Standing - Static: Good  Standing - Dynamic : Fair  Ambulation/Gait Training:                                                      Pain:  Pain Scale 1: Numeric (0 - 10)  Pain Intensity 1: 5  Pain Location 1: Leg  Pain Orientation 1: Right  Pain Description 1: Aching  Pain Intervention(s) 1: Medication (see MAR)  Activity Tolerance:   Pt still fatigued from just getting to chair.     .  After treatment:   [x] Patient left in no apparent distress sitting up in chair  [] Patient left in no apparent distress in bed  [x] Call bell left within reach  [x] Nursing notified  [] Caregiver present  [] Bed alarm activated    COMMUNICATION/COLLABORATION:   The patients plan of care was discussed with: Registered Nurse    Ildefonso Fields   Time Calculation: 8 mins

## 2017-11-10 NOTE — PROGRESS NOTES
Hematology Oncology Progress Note       Follow up for: NSCLC    Chart notes reviewed since last visit. Case discussed with following: Dr. Belkys Michael and physical therapy staff. Patient complains of the following: markedly winded with efforts to walk with PT. Heart rate jumped from 106 to 131 and they did note that his knees started to buckle while walking. He is extremely winded and breathing hard but O2 sats are actually okay. Additional concerns noted by the staff:     Patient Vitals for the past 24 hrs:   BP Temp Pulse Resp SpO2   11/10/17 0815 162/89 98.5 °F (36.9 °C) 73 18 98 %   11/10/17 0806 - - - - 98 %   11/10/17 0309 166/83 98.4 °F (36.9 °C) 72 20 97 %   11/09/17 2322 145/80 97.9 °F (36.6 °C) 78 20 97 %   11/09/17 1918 166/75 97.8 °F (36.6 °C) 76 24 94 %   11/09/17 1607 - - - - 97 %   11/09/17 1532 165/78 98 °F (36.7 °C) 74 22 96 %   11/09/17 1140 - - - - 98 %   11/09/17 1059 (!) 169/92 97.6 °F (36.4 °C) 68 - 98 %       Review of Systems:  Constitutional No fevers, chills, night sweats, excessive fatigue or weight loss. Allergic/Immunologic No recent allergic reactions   Eyes No significant visual difficulties. No diplopia. ENMT Very hard of hearing, no sore throat, no sinus drainage. Endocrine No hot flashes or night sweats. No cold intolerance, polyuria, or polydipsia   Hematologic/Lymphatic No easy bruising or bleeding. The patient denies any tender or palpable lymph nodes   Breasts No abnormal masses of breast, nipple discharge or pain. Respiratory  dyspnea on exertion   Cardiovascular No anginal chest pain, irregular heart beat, tachycardia, palpitations or orthopnea. Gastrointestinal No nausea, vomiting, diarrhea, constipation, cramping, dysphagia, reflux, heartburn, GI bleeding, or early satiety. No change in bowel habits. Genitourinary (M) No hematuria, dysuria, increased frequency, urgency, hesitancy or incontinence. Musculoskeletal No joint pain, swelling or redness.  No decreased range of motion. Integumentary No chronic rashes, inflammation, ulcerations, pruritus, petechiae, purpura, ecchymoses, or skin changes. Neurologic No headache, blurred vision, and no areas of focal weakness or numbness. Psychiatric No insomnia, depression, adrienne or mood swings. No psychotropic drugs. Physical Examination:  Constitutional Alert, cooperative, oriented. Mood and affect appropriate. Appears close to chronological age. Well nourished. Well developed. Head Normocephalic; no scars   Eyes Conjunctivae and sclerae are clear and without icterus. Pupils are reactive and equal.   ENMT Sinuses are nontender. No oral exudates, ulcers, masses, thrush or mucositis. Oropharynx clear. Tongue normal.   Neck Supple without masses or thyromegaly. No jugular venous distension. Hematologic/Lymphatic No petechiae or purpura. No tender or palpable lymph nodes noted   Respiratory Lungs are clear to auscultation without rhonchi or wheezing. Cardiovascular Regular rate and rhythm of heart without murmurs, gallops or rubs. Chest / Line Site Chest is symmetric with no chest wall deformities. Abdomen Non-tender, non-distended, no masses, ascites or hepatosplenomegaly. Good bowel sounds. No guarding or rebound tenderness. No pulsatile masses. Musculoskeletal No tenderness or swelling, normal range of motion without obvious weakness. Extremities No visible deformities, no cyanosis, clubbing or edema. Skin No rashes, scars, or lesions suggestive of malignancy. No petechiae, purpura, or ecchymoses. No excoriations. Neurologic No sensory or motor deficits but not specifically tested. Psychiatric Alert and oriented. Coherent speech. Verbalizes understanding of our discussions today. Labs:  No results found for this or any previous visit (from the past 24 hour(s)). Assessment and Plan:   NSCLC - opdivo on hold for now given current high dose steroid usage.     Abrupt onset loss of hearing - followup with Dr. Hazel Sousa missed Tuesday. Will see if he can be seen here given plans for inpt rehab    Falls - on telemetry to see if associated with arrhythmia. Severe COPD with acute exacerbation - currently being treated.

## 2017-11-10 NOTE — PROGRESS NOTES
Oncology Nursing Communication Tool  7:53 AM  11/10/2017     Bedside shift change report given to TYE Yuan (incoming nurse) by Eleonora Cornejo RN (outgoing nurse) on Callie Copping. Report included the following information SBAR, Kardex, MAR, Accordion and Recent Results. Significant changes during shift: None      Issues for physician to address: None         Oncology Shift Note   Admission Date 11/5/2017   Admission Diagnosis COPD exacerbation ([facility] [address]  Acute respiratory distress   Code Status Partial Code   Consults IP CONSULT TO PULMONOLOGY  IP CONSULT TO HEMATOLOGY      Cardiac Monitoring [x] Yes [] No      Purposeful Hourly Rounding [x] Yes    Robert Score Total Score: 4   Robert score 3 or > [] Bed Alarm [] Avasys [] 1:1 sitter [] Patient refused (Place signed refusal form in chart)      Pain Managed [] Yes [] No    Key Pain Meds             aspirin (ASPIRIN) 325 mg tablet  (Taking) Take 81 mg by mouth daily. Influenza Vaccine Received Flu Vaccine for Current Season (usually Sept-March): Yes (august 2017)           Oxygen needs? [x] Room air Oxygen @  []1L    []2L    []3L   []4L    []5L   []6L     Use home O2? [] Yes [x] No  Perform O2 challenge test using  smartphrase (.Homeoxygen)      Last bowel movement Last Bowel Movement Date: 11/05/17      Urinary Catheter             LDAs             Venous Access Device right chest [facility] 11/05/17 Upper chest (subclavicular area, right (Active)   Central Line Being Utilized Yes 11/10/2017  3:09 AM   Criteria for Appropriate Use Limited/no vessel suitable for conventional peripheral access 11/10/2017  3:09 AM   Site Assessment Clean, dry, & intact 11/10/2017  3:09 AM   Date of Last Dressing Change 11/05/17 11/10/2017  3:09 AM   Dressing Status Clean, dry, & intact 11/10/2017  3:09 AM   Dressing Type Disk with Chlorhexadine gluconate (CHG); Transparent 11/10/2017  3:09 AM   Action Taken Blood drawn 11/6/2017  5:22 AM   Date Accessed ([facility]) 11/05/17 11/10/2017  3:09 AM   Access Time (Medial Site) 2255 11/5/2017 10:59 PM   Access Needle Size (Site #1) 20 G 11/10/2017  3:09 AM   Access Needle Length (Medial Site) 1 inch 11/10/2017  3:09 AM   Positive Blood Return (Medial Site) Yes 11/10/2017  3:09 AM   Action Taken (Medial Site) Infusing 11/9/2017  3:03 AM   Alcohol Cap Used Yes 11/10/2017  3:09 AM      Peripheral IV 11/05/17 Left Antecubital (Active)   Site Assessment Clean, dry, & intact 11/10/2017  3:09 AM   Phlebitis Assessment 0 11/10/2017  3:09 AM   Infiltration Assessment 0 11/10/2017  3:09 AM   Dressing Status Clean, dry, & intact 11/10/2017  3:09 AM   Dressing Type Transparent;Tape 11/10/2017  3:09 AM   Hub Color/Line Status Pink;Capped 11/10/2017  3:09 AM   Alcohol Cap Used Yes 11/5/2017  3:16 PM                         Readmission Risk Assessment Tool Score Low Risk            8       Total Score        3 Has Seen PCP in Last 6 Months (Yes=3, No=0)    2 . Living with Significant Other. Assisted Living. LTAC. SNF. or   Rehab    3 Charlson Comorbidity Score (Age + Comorbid Conditions)        Criteria that do not apply:    Patient Length of Stay (>5 days = 3)    IP Visits Last 12 Months (1-3=4, 4=9, >4=11)    Pt. Coverage (Medicare=5 , Medicaid, or Self-Pay=4)       Expected Length of Stay 3d 2h   Actual Length of Stay 5          Opportunity for questions and clarifications were given to the incoming nurse. Patient's bed is in low position, side rails x2, door open PRN, call bell within reach and patient not in distress.       Mone Ace, RN

## 2017-11-10 NOTE — PROGRESS NOTES
Oncology Interdisciplinary rounds were held today to discuss patient plan of care and outcomes.  The following members were present: Nursing and Case Management.         Plan of Care: [facility], consult to palliative.      Discharge Disposition Home

## 2017-11-10 NOTE — PROGRESS NOTES
S: Mr. Erwin Fischer was seen by me today during rounds. At this time, he is resting in bed. He is still SOB. Also complains of hearing loss, and is shouting. Generalized weakness. Able to walk only a few steps to chair with PT.  ROS otherwise unremarkable except as noted elsewhere. O: Blood pressure 162/89, pulse 73, temperature 98.5 °F (36.9 °C), resp. rate 18, height 6' (1.829 m), weight 170 lb (77.1 kg), SpO2 98 %. Gen: Patient is somewhat tachypnic. Lungs: Exp wheezing noted. Heart: RRR. Abd: S, NT, ND, BS present. Extremities: Warm. No results found for this or any previous visit (from the past 12 hour(s)). CT Head: No change. No acute abnormality identified    CTA chest:   1. Mildly enlarged left hilar lymph node. 2. Left lower lobe bullous emphysema associated with scarring. Lumbar Spine MRI:   No evidence of osseous metastatic disease. No canal or foraminal stenosis. Mild effacement of nerve roots extending to the left S1 foramen. Echo:  Left ventricle: Systolic function was normal. Ejection fraction was  estimated to be 65 %. There were no regional wall motion abnormalities. A / P:   1. Acute respiratory distress (11/5/2017): On oxygen as needed, other measures as below. Pulmonology following. Still quite dyspneic. 2. COPD exacerbation ([facility] [address] (11/5/2017): Scheduled nebs, IV solumedrol. 3. Leg weakness: Neuro work up in progress. Able to ambulate now, but limited by respiratory status. 4. Squamous Cell Lung Cancer, unresectable: Opdivo on hold for last few weeks on fear that it is exacerbating his COPD. His wife is concerned about the long time off chemo--Dr. Xi De consulted; I spoke with her about the current situation. Mona Guerrero would be \"useless\" while he is on steroids. 5. Elevated lactic acid. 6. Hearing loss: ?Due to chemo--not typical for opdivo. Discussed with Dr. Xi De and we'll ask Dr. Donna Wilder to see him before he leaves. 7. Hypertension: Controlled. 8. Falls/Deconditioning: PT / OT as breathing improves. 9. ETOH: CIWA protocol. 10. Mucositis  11.  Code: Partial.   12. D/C planning: Inpatient Pulmonary Rehab if possible

## 2017-11-10 NOTE — PROGRESS NOTES
CM received phone call from [address]. Their facility agreed to accept patient for pulmonary rehab, however his [address] insurance denied coverage. CM placed page for Dr. Emma Ibarra to inform him that peer to peer would be needed if he wanted to pursue pulmonary rehab. CM will discuss need for  possible palliative care consult. Kimmie Tolliver RN, BSN, AC   - Medical Oncology  [phone]    ADDENDUM:  3:30PM    CM received follow-up phone call from Dr. Miranda Coffman - Peer to Peer was also unsuccessful, as patient was again denied Inpatient Pulmonary Rehab. He would likely be approved for short sub-acute rehab. Patient requesting to go home. CM spoke to [facility] for suggestions for other types of pulmonary rehab if patient does not agree to SNF. [facility] suggested Outpatient pumonary rehab at [facility], but patient would need new PFT studies prior to being started with services and would also need to tolerate transport back and forth to hospital.  Another option would be home health with [address] [place] [address] [phone] - for home pulmonary rehab, however they do not accept patient's [employer]. Patient's wife updated on rehab options. She will discuss SNF with patient to see if he would be agreeable.     Kimmie Tolliver RN, BSN, [facility]   - Medical Oncology  [phone]

## 2017-11-10 NOTE — PROGRESS NOTES
Attempted to see pt for therapy services. Pt was either recovering from PT session or was meeting with [facility] Rep. Will defer and continue to follow.

## 2017-11-10 NOTE — PROGRESS NOTES
Bedside and Verbal shift change report given to Eileen (oncoming nurse) by Nancy Henning (offgoing nurse). Report included the following information SBAR, Kardex, MAR and Recent Results.

## 2017-11-10 NOTE — PROGRESS NOTES
Problem: Mobility Impaired (Adult and Pediatric)  Goal: *Acute Goals and Plan of Care (Insert Text)  Physical Therapy Goals  Initiated 11/7/2017  1. Patient will move from supine to sit and sit to supine , scoot up and down and roll side to side in bed with independence within 7 day(s). 2.  Patient will transfer from bed to chair and chair to bed with independence using the least restrictive device within 7 day(s). 3.  Patient will perform sit to stand with independence within 7 day(s). 4.  Patient will ambulate with modified independence for 100 feet with the least restrictive device within 7 day(s). physical Therapy TREATMENT  Patient: Manuel Silva (59 y.o. male)  Date: 11/10/2017  Diagnosis: COPD exacerbation ([facility] [address]  Acute respiratory distress <principal problem not specified>       Precautions: Fall    ASSESSMENT:  Patient making some progress with mobility, activity tolerance. Bed mobility with supervision. Sit to stand with CGA. Patient ambulated 12' to bathroom with rolling walker and min assist x 2. Patient quite unsteady ambulating. Patient sat x 5 minutes on toilet then ambulated additional 12' back to chair. Knees begin to buckle once close to chair. O2 sats sitting EOB 97%, ; after ambulating 93%, . Increased respiration rate even sitting EOB. On room air throughout treatment. Recommend pulmonary rehab at discharge. Progression toward goals:  [x]    Improving appropriately and progressing toward goals  []    Improving slowly and progressing toward goals  []    Not making progress toward goals and plan of care will be adjusted     PLAN:  Patient continues to benefit from skilled intervention to address the above impairments. Continue treatment per established plan of care. Discharge Recommendations:  Inpatient Rehab (pulmonary)  Further Equipment Recommendations for Discharge:  TBD     SUBJECTIVE:   Patient stated I need a new lung.     OBJECTIVE DATA SUMMARY: Critical Behavior:  Neurologic State: Alert  Orientation Level: Oriented X4  Cognition: Appropriate decision making, Appropriate for age attention/concentration, Appropriate safety awareness  Safety/Judgement: Awareness of environment, Fall prevention, Home safety  Functional Mobility Training:  Bed Mobility:  Rolling: Supervision  Supine to Sit: Supervision     Scooting: Supervision        Transfers:  Sit to Stand: Contact guard assistance; Additional time  Stand to Sit: Minimum assistance;Assist x1        Bed to Chair: Minimum assistance                    Balance:  Sitting: Intact  Standing: Impaired  Standing - Static: Fair;Constant support  Standing - Dynamic : Fair (with rolling walker)  Ambulation/Gait Training:  Distance (ft): 24 Feet (ft) (12' x 2)  Assistive Device: Gait belt;Walker, rolling  Ambulation - Level of Assistance: Minimal assistance;Assist x2        Gait Abnormalities: Decreased step clearance              Speed/Alicia: Pace decreased (<100 feet/min)  Step Length: Left shortened;Right shortened                                Pain:  Pain Scale 1: Numeric (0 - 10)  Pain Intensity 1: 0              Activity Tolerance:   Poor due to respiratory status  Please refer to the flowsheet for vital signs taken during this treatment.   After treatment:   [x]    Patient left in no apparent distress sitting up in chair  []    Patient left in no apparent distress in bed  [x]    Call bell left within reach  [x]    Nursing notified  []    Caregiver present  [x]    Bed alarm activated    COMMUNICATION/COLLABORATION:   The patients plan of care was discussed with: Registered Nurse    Lola Degroot, PT   Time Calculation: 19 mins

## 2017-11-10 NOTE — PROGRESS NOTES
Problem: Falls - Risk of  Goal: *Absence of Falls  Document Robert Fall Risk and appropriate interventions in the flowsheet.    Outcome: Progressing Towards Goal  Fall Risk Interventions:  Mobility Interventions: Patient to call before getting OOB         Medication Interventions: Patient to call before getting OOB    Elimination Interventions: Call light in reach, Toileting schedule/hourly rounds    History of Falls Interventions: Door open when patient unattended

## 2017-11-11 NOTE — PROGRESS NOTES
ADULT PROTOCOL: JET AEROSOL  REASSESSMENT    Patient  Rosana Tate     63 y.o.   male     11/11/2017  1:21 AM    Breath Sounds Pre Procedure: Right Breath Sounds: Expiratory wheezing                               Left Breath Sounds: Expiratory wheezing    Breath Sounds Post Procedure: Right Breath Sounds: Scattered wheezing                                 Left Breath Sounds: Scattered wheezing    Breathing pattern: Pre procedure Breathing Pattern: Regular          Post procedure Breathing Pattern: Regular    Heart Rate: Pre procedure Pulse: 80           Post procedure Pulse: 83    Resp Rate: Pre procedure Respirations: 18           Post procedure Respirations: 20    Peak Flow: Pre bronchodilator             Post bronchodilator       FVC/FEV1:  na    Incentive Spirometry:             Cough: Pre procedure Cough: Congested               Post procedure Cough: Congested    Suctioned: NO    Sputum: Pre procedure                   Post procedure      Oxygen: O2 Device: Room air   RA     Changed: NO    SpO2: Pre procedure SpO2: 96 %   without oxygen              Post procedure SpO2: 97 %  without oxygen    Nebulizer Therapy: Current medications Aerosolized Medications: DuoNeb, Pulmicort      Changed: NO    Smoking History: n/a    Problem List:   Patient Active Problem List   Diagnosis Code    Hypertension I10    COPD (chronic obstructive pulmonary disease) ([facility]) J44.9    Asthma J45.909    Hyperlipidemia E78.5    Hyponatremia E87.1    Acute renal failure ([facility]) N17.9    Osteoarthritis of right hip M16.11    DJD (degenerative joint disease) of hip M16.9    Esophageal obstruction due to food impaction K22.2, Z07.477Z    B12 deficiency E53.8    S/P bronchoscopy with biopsy Z98.890    Chronic coronary artery disease I25.10    Gastroesophageal reflux disease with esophagitis K21.0    Cancer of lung ([facility]) C34.90    Lung cancer ([facility]) C34.90    Acute respiratory distress R06.03    COPD exacerbation ([facility]) J44.1 Respiratory Therapist: Tamy Dunn, RT

## 2017-11-11 NOTE — PROGRESS NOTES
General Daily Progress Note    Admit Date: 11/5/2017  Hospital day 6    Subjective:     Patient has no complaint of chest pain. Still wheezing and short of breath. Moderate cough. Very hard of hearing. ..   Medication side effects: none    Current Facility-Administered Medications   Medication Dose Route Frequency    [START ON 11/12/2017] fluticasone-vilanterol (BREO ELLIPTA) 100mcg-25mcg/puff  1 Puff Inhalation DAILY    albuterol-ipratropium (DUO-NEB) 2.5 MG-0.5 MG/3 ML  3 mL Nebulization Q4H RT    aluminum & magnesium hydroxide-simethicone (MYLANTA II) oral suspension 30 mL  30 mL Oral Q4H PRN    guaiFENesin ER (MUCINEX) tablet 600 mg  600 mg Oral Q12H    methylPREDNISolone (PF) (SOLU-MEDROL) injection 40 mg  40 mg IntraVENous Q6H    gabapentin (NEURONTIN) capsule 200 mg  200 mg Oral BID    lisinopril (PRINIVIL, ZESTRIL) tablet 10 mg  10 mg Oral DAILY    umeclidinium (INCRUSE ELLIPTA) 62.5 mcg/actuation  1 Puff Inhalation DAILY    albuterol (PROVENTIL VENTOLIN) nebulizer solution 2.5 mg  2.5 mg Nebulization Q4H PRN    nicotine (NICODERM CQ) 7 mg/24 hr patch 1 Patch  1 Patch TransDERmal DAILY    sodium chloride (NS) flush 5-10 mL  5-10 mL IntraVENous Q8H    sodium chloride (NS) flush 5-10 mL  5-10 mL IntraVENous PRN    acetaminophen (TYLENOL) tablet 650 mg  650 mg Oral Q4H PRN    ondansetron (ZOFRAN) injection 4 mg  4 mg IntraVENous Q4H PRN    enoxaparin (LOVENOX) injection 40 mg  40 mg SubCUTAneous Q24H    aspirin chewable tablet 81 mg  81 mg Oral DAILY    klack's mouthwash oral suspension (COMPOUNDED)  5 mL Oral QID    diazePAM (VALIUM) tablet 5 mg  5 mg Oral Q1H PRN    diazePAM (VALIUM) tablet 10 mg  10 mg Oral P8Q PRN    folic acid (FOLVITE) tablet 1 mg  1 mg Oral DAILY    thiamine (B-1) tablet 100 mg  100 mg Oral DAILY    therapeutic multivitamin (THERAGRAN) tablet 1 Tab  1 Tab Oral DAILY        Review of Systems  Pertinent items are noted in HPI.     Objective:     Patient Vitals for the past 8 hrs:   BP Temp Pulse Resp SpO2   11/11/17 1118 - - - - 98 %   11/11/17 0800 (!) 158/91 98 °F (36.7 °C) 80 18 -   11/11/17 0759 - - - - 98 %        11/09 1901 - 11/11 0700  In: -   Out: 400 [Urine:400]    Physical Exam:   Visit Vitals    BP (!) 158/91 (BP 1 Location: Left arm, BP Patient Position: At rest)    Pulse 80    Temp 98 °F (36.7 °C)    Resp 18    Ht 6' (1.829 m)    Wt 170 lb (77.1 kg)    SpO2 98%    BMI 23.06 kg/m2     Lungs: wheezes R apex, R base, L apex, L base  Heart: regular rate and rhythm, S1, S2 normal, no murmur, click, rub or gallop  Abdomen: soft, non-tender. Bowel sounds normal. No masses,  no organomegaly  Extremities: extremities normal, atraumatic, no cyanosis or edema      ECG: normal sinus rhythm     Data Review No results found for this or any previous visit (from the past 24 hour(s)). Assessment:     Active Problems:    Acute respiratory distress (11/5/2017)      COPD exacerbation ([facility] [address] (11/5/2017)        Plan:     1. Continued dyspnea and wheezing secondary to COPD/lung cancer. Will add LABA in form of Breo. (DC budesonide so as to not duplicate inhaled steroid). Continue incruse, duonebs, iv solumedrol. Will also repeat pCXR.

## 2017-11-11 NOTE — PROGRESS NOTES
Oncology Nursing Communication Tool  8:05 AM  11/11/2017     Bedside shift change report given to Eileen RN (incoming nurse) by Scotty Feliciano RN (outgoing nurse) on [facility]. Report included the following information SBAR, Kardex and MAR. Significant changes during shift: none      Issues for physician to address: none       Oncology Shift Note   Admission Date 11/5/2017   Admission Diagnosis COPD exacerbation ([address]  Acute respiratory distress   Code Status Partial Code   Consults IP CONSULT TO PULMONOLOGY  IP CONSULT TO HEMATOLOGY  IP CONSULT TO OTOLARYNGOLOGY  IP CONSULT TO PALLIATIVE CARE - PROVIDER      Cardiac Monitoring [x] Yes [] No      Purposeful Hourly Rounding [x] Yes    Robert Score Total Score: 4   Robert score 3 or > [] Bed Alarm [] Avasys [] 1:1 sitter [] Patient refused (Place signed refusal form in chart)      Pain Managed [] Yes [x] No    Key Pain Meds             aspirin (ASPIRIN) 325 mg tablet  (Taking) Take 81 mg by mouth daily. Influenza Vaccine Received Flu Vaccine for Current Season (usually Sept-March): Yes (august 2017)           Oxygen needs?  [x] Room air Oxygen @  []1L    []2L    []3L   []4L    []5L   []6L     Use home O2? [] Yes [] No  Perform O2 challenge test using  smartphrase (.Homeoxygen)      Last bowel movement Last Bowel Movement Date: 11/05/17      Urinary Catheter             LDAs             Venous Access Device right chest [facility] 11/05/17 Upper chest (subclavicular area, right (Active)   Central Line Being Utilized Yes 11/11/2017  3:10 AM   Criteria for Appropriate Use Limited/no vessel suitable for conventional peripheral access 11/11/2017  3:10 AM   Site Assessment Clean, dry, & intact 11/11/2017  3:10 AM   Date of Last Dressing Change 11/05/17 11/11/2017  3:10 AM   Dressing Status Clean, dry, & intact 11/11/2017  3:10 AM   Dressing Type Disk with Chlorhexadine gluconate (CHG) 11/11/2017  3:10 AM   Action Taken Blood drawn 11/6/2017  5:22 AM Date Accessed (Medial Site) 11/05/17 11/10/2017  7:30 AM   Access Time (Medial Site) 2255 11/5/2017 10:59 PM   Access Needle Size (Site #1) 20 G 11/10/2017  3:09 AM   Access Needle Length (Medial Site) 1 inch 11/10/2017  3:09 AM   Positive Blood Return (Medial Site) Yes 11/10/2017  7:30 AM   Action Taken (Medial Site) Infusing 11/9/2017  3:03 AM   Alcohol Cap Used Yes 11/10/2017  3:09 AM                           Readmission Risk Assessment Tool Score Low Risk            11       Total Score        3 Has Seen PCP in Last 6 Months (Yes=3, No=0)    2 . Living with Significant Other. Assisted Living. LTAC. SNF. or   Rehab    3 Patient Length of Stay (>5 days = 3)    3 Charlson Comorbidity Score (Age + Comorbid Conditions)        Criteria that do not apply:    IP Visits Last 12 Months (1-3=4, 4=9, >4=11)    Pt. Coverage (Medicare=5 , Medicaid, or Self-Pay=4)       Expected Length of Stay 3d 2h   Actual Length of Stay 6          Opportunity for questions and clarifications were given to the incoming nurse. Patient's bed is in low position, side rails x2, door open PRN, call bell within reach and patient not in distress.       Josesito Lambert, [address]

## 2017-11-11 NOTE — PROGRESS NOTES
Oncology Nursing Communication Tool  11:37 AM  11/11/2017     Bedside shift change report given to Reny Chatman RN (incoming nurse) by Heri Kaye RN (outgoing nurse) on Jillian Waite. Report included the following information SBAR, Kardex, Intake/Output, MAR, Accordion and Recent Results. Significant changes during shift: No significant  change      Issues for physician to address: none       Oncology Shift Note   Admission Date 11/5/2017   Admission Diagnosis COPD exacerbation ([facility] [address]  Acute respiratory distress   Code Status Partial Code   Consults IP CONSULT TO PULMONOLOGY  IP CONSULT TO HEMATOLOGY  IP CONSULT TO OTOLARYNGOLOGY  IP CONSULT TO PALLIATIVE CARE - PROVIDER      Cardiac Monitoring [x] Yes [] No      Purposeful Hourly Rounding [x] Yes    Robert Score Total Score: 4   Robert score 3 or > [] Bed Alarm [] Avasys [] 1:1 sitter [] Patient refused (Place signed refusal form in chart)      Pain Managed [x] Yes [] No    Key Pain Meds             aspirin (ASPIRIN) 325 mg tablet  (Taking) Take 81 mg by mouth daily. Influenza Vaccine Received Flu Vaccine for Current Season (usually Sept-March): Yes (august 2017)           Oxygen needs?  [x] Room air Oxygen @  []1L    []2L    []3L   []4L    []5L   []6L     Use home O2? [] Yes [] No  Perform O2 challenge test using  smartphrase (.Homeoxygen)      Last bowel movement Last Bowel Movement Date: 11/05/17      Urinary Catheter             LDAs             Venous Access Device right chest [facility] 11/05/17 Upper chest (subclavicular area, right (Active)   Central Line Being Utilized Yes 11/11/2017  7:59 AM   Criteria for Appropriate Use Limited/no vessel suitable for conventional peripheral access 11/11/2017  7:59 AM   Site Assessment Clean, dry, & intact 11/11/2017  7:59 AM   Date of Last Dressing Change 11/05/17 11/11/2017  7:59 AM   Dressing Status Clean, dry, & intact 11/11/2017  7:59 AM   Dressing Type Tape;Transparent 11/11/2017  7:59 AM Action Taken Other (comment) 11/11/2017  7:59 AM   Date Accessed (Medial Site) 11/05/17 11/11/2017  7:59 AM   Access Time (Medial Site) 1926 11/5/2017 10:59 PM   Access Needle Size (Site #1) 20 G 11/10/2017  3:09 AM   Access Needle Length (Medial Site) 1 inch 11/10/2017  3:09 AM   Positive Blood Return (Medial Site) Yes 11/10/2017  7:30 AM   Action Taken (Medial Site) Infusing 11/9/2017  3:03 AM   Alcohol Cap Used Yes 11/10/2017  3:09 AM                           Readmission Risk Assessment Tool Score Low Risk            11       Total Score        3 Has Seen PCP in Last 6 Months (Yes=3, No=0)    2 . Living with Significant Other. Assisted Living. LTAC. SNF. or   Rehab    3 Patient Length of Stay (>5 days = 3)    3 Charlson Comorbidity Score (Age + Comorbid Conditions)        Criteria that do not apply:    IP Visits Last 12 Months (1-3=4, 4=9, >4=11)    Pt. Coverage (Medicare=5 , Medicaid, or Self-Pay=4)       Expected Length of Stay 3d 2h   Actual Length of Stay 6          Opportunity for questions and clarifications were given to the incoming nurse. Patient's bed is in low position, side rails x2, door open PRN, call bell within reach and patient not in distress.       Miriam Weiner, RN

## 2017-11-11 NOTE — PROGRESS NOTES
Oncology Nursing Communication Tool  7:10 PM  11/10/2017     Bedside and Verbal shift change report given to [facility] AT [facility] (incoming nurse) by Francisca Lazaro RN (outgoing nurse) on [place]. Report included the following information SBAR, Kardex, Intake/Output, MAR and Recent Results. Significant changes during shift: more confused tonight      Issues for physician to address: none         Oncology Shift Note   Admission Date 11/5/2017   Admission Diagnosis COPD exacerbation ([address]  Acute respiratory distress   Code Status Partial Code   Consults IP CONSULT TO PULMONOLOGY  IP CONSULT TO HEMATOLOGY  IP CONSULT TO OTOLARYNGOLOGY  IP CONSULT TO PALLIATIVE CARE - PROVIDER      Cardiac Monitoring [x] Yes [] No      Purposeful Hourly Rounding [x] Yes    Robert Score Total Score: 4   Robert score 3 or > [x] Bed Alarm [] Avasys [] 1:1 sitter [] Patient refused (Place signed refusal form in chart)      Pain Managed [x] Yes [] No    Key Pain Meds             aspirin (ASPIRIN) 325 mg tablet  (Taking) Take 81 mg by mouth daily. Influenza Vaccine Received Flu Vaccine for Current Season (usually Sept-March): Yes (august 2017)           Oxygen needs?  [x] Room air Oxygen @  []1L    []2L    []3L   []4L    []5L   []6L     Use home O2? [] Yes [x] No  Perform O2 challenge test using  smartphrase (.Homeoxygen)      Last bowel movement Last Bowel Movement Date: 11/05/17      Urinary Catheter             LDAs             Venous Access Device right chest [facility] 11/05/17 Upper chest (subclavicular area, right (Active)   Central Line Being Utilized Yes 11/10/2017  7:30 AM   Criteria for Appropriate Use Limited/no vessel suitable for conventional peripheral access 11/10/2017  7:30 AM   Site Assessment Clean, dry, & intact 11/10/2017  7:30 AM   Date of Last Dressing Change 11/05/17 11/10/2017  7:30 AM   Dressing Status Clean, dry, & intact 11/10/2017  7:30 AM   Dressing Type Disk with Chlorhexadine gluconate (CHG) 11/10/2017  7:30 AM   Action Taken Blood drawn 11/6/2017  5:22 AM   Date Accessed (Medial Site) 11/05/17 11/10/2017  7:30 AM   Access Time (Medial Site) 2255 11/5/2017 10:59 PM   Access Needle Size (Site #1) 20 G 11/10/2017  3:09 AM   Access Needle Length (Medial Site) 1 inch 11/10/2017  3:09 AM   Positive Blood Return (Medial Site) Yes 11/10/2017  7:30 AM   Action Taken (Medial Site) Infusing 11/9/2017  3:03 AM   Alcohol Cap Used Yes 11/10/2017  3:09 AM      Peripheral IV 11/05/17 Left Antecubital (Active)   Site Assessment Clean, dry, & intact 11/10/2017  7:30 AM   Phlebitis Assessment 0 11/10/2017  7:30 AM   Infiltration Assessment 0 11/10/2017  7:30 AM   Dressing Status Clean, dry, & intact 11/10/2017  7:30 AM   Dressing Type Tape;Transparent 11/10/2017  7:30 AM   Hub Color/Line Status Pink;Capped 11/10/2017  7:30 AM   Alcohol Cap Used Yes 11/5/2017  3:16 PM                         Readmission Risk Assessment Tool Score Low Risk            11       Total Score        3 Has Seen PCP in Last 6 Months (Yes=3, No=0)    2 . Living with Significant Other. Assisted Living. LTAC. SNF. or   Rehab    3 Patient Length of Stay (>5 days = 3)    3 Charlson Comorbidity Score (Age + Comorbid Conditions)        Criteria that do not apply:    IP Visits Last 12 Months (1-3=4, 4=9, >4=11)    Pt. Coverage (Medicare=5 , Medicaid, or Self-Pay=4)       Expected Length of Stay 3d 2h   Actual Length of Stay 5          Opportunity for questions and clarifications were given to the incoming nurse. Patient's bed is in low position, side rails x2, door open PRN, call bell within reach and patient not in distress.       Leticia Weinstein RN

## 2017-11-12 NOTE — PROGRESS NOTES
Oncology Nursing Communication Tool  7:31 PM  11/11/2017     Bedside shift change report given to Tierra Alanis RN (incoming nurse) by Iker Gonzalez (outgoing nurse) on [employer]. Report included the following information SBAR, Kardex, Intake/Output, MAR and Recent Results. Significant changes during shift:       Issues for physician to address: Oncology Shift Note   Admission Date 11/5/2017   Admission Diagnosis COPD exacerbation ([facility] [address]  Acute respiratory distress   Code Status Partial Code   Consults IP CONSULT TO PULMONOLOGY  IP CONSULT TO HEMATOLOGY  IP CONSULT TO OTOLARYNGOLOGY  IP CONSULT TO PALLIATIVE CARE - PROVIDER      Cardiac Monitoring [] Yes [] No      Purposeful Hourly Rounding [] Yes    Robert Score Total Score: 4   Robert score 3 or > [] Bed Alarm [] Avasys [] 1:1 sitter [] Patient refused (Place signed refusal form in chart)      Pain Managed [] Yes [] No    Key Pain Meds             aspirin (ASPIRIN) 325 mg tablet  (Taking) Take 81 mg by mouth daily. Influenza Vaccine Received Flu Vaccine for Current Season (usually Sept-March): Yes (august 2017)           Oxygen needs?  [] Room air Oxygen @  []1L    []2L    []3L   []4L    []5L   []6L     Use home O2? [] Yes [] No  Perform O2 challenge test using  smartphrase (.Homeoxygen)      Last bowel movement Last Bowel Movement Date: 11/05/17      Urinary Catheter             LDAs             Venous Access Device right chest [facility] 11/05/17 Upper chest (subclavicular area, right (Active)   Central Line Being Utilized Yes 11/11/2017  7:30 PM   Criteria for Appropriate Use Limited/no vessel suitable for conventional peripheral access 11/11/2017 12:00 PM   Site Assessment Clean, dry, & intact 11/11/2017 12:00 PM   Date of Last Dressing Change 11/05/17 11/11/2017 12:00 PM   Dressing Status Clean, dry, & intact 11/11/2017 12:00 PM   Dressing Type Tape;Transparent 11/11/2017 12:00 PM   Action Taken Other (comment) 11/11/2017  7:59 AM Date Accessed (Medial Site) 11/05/17 11/11/2017 12:00 PM   Access Time (Medial Site) 2252 11/5/2017 10:59 PM   Access Needle Size (Site #1) 20 G 11/10/2017  3:09 AM   Access Needle Length (Medial Site) 1 inch 11/10/2017  3:09 AM   Positive Blood Return (Medial Site) Yes 11/11/2017 12:00 PM   Action Taken (Medial Site) Infusing 11/9/2017  3:03 AM   Alcohol Cap Used Yes 11/10/2017  3:09 AM                           Readmission Risk Assessment Tool Score Low Risk            11       Total Score        3 Has Seen PCP in Last 6 Months (Yes=3, No=0)    2 . Living with Significant Other. Assisted Living. LTAC. SNF. or   Rehab    3 Patient Length of Stay (>5 days = 3)    3 Charlson Comorbidity Score (Age + Comorbid Conditions)        Criteria that do not apply:    IP Visits Last 12 Months (1-3=4, 4=9, >4=11)    Pt. Coverage (Medicare=5 , Medicaid, or Self-Pay=4)       Expected Length of Stay 3d 2h   Actual Length of Stay 6          Opportunity for questions and clarifications were given to the incoming nurse. Patient's bed is in low position, side rails x2, door open PRN, call bell within reach and patient not in distress.       Danilo Salazar

## 2017-11-12 NOTE — PROGRESS NOTES
Oncology Nursing Communication Tool  2835 AM  11/12/2017     Bedside shift change report given to Erin Jensen RN (incoming nurse) by Rebecca Coates RN (outgoing nurse) on [facility]. Report included the following information SBAR, Kardex, Intake/Output, MAR, Accordion, Recent Results and Med Rec Status. Significant changes during shift: None      Issues for physician to address: None         Oncology Shift Note   Admission Date 11/5/2017   Admission Diagnosis COPD exacerbation ([facility] [address]  Acute respiratory distress   Code Status Partial Code   Consults IP CONSULT TO PULMONOLOGY  IP CONSULT TO HEMATOLOGY  IP CONSULT TO OTOLARYNGOLOGY  IP CONSULT TO PALLIATIVE CARE - PROVIDER      Cardiac Monitoring [x] Yes [] No      Purposeful Hourly Rounding [x] Yes    Robert Score Total Score: 4   Robert score 3 or > [] Bed Alarm [] Avasys [] 1:1 sitter [] Patient refused (Place signed refusal form in chart)      Pain Managed [x] Yes [] No    Key Pain Meds             aspirin (ASPIRIN) 325 mg tablet  (Taking) Take 81 mg by mouth daily. Influenza Vaccine Received Flu Vaccine for Current Season (usually Sept-March): Yes (august 2017)           Oxygen needs?  [x] Room air Oxygen @  []1L    []2L    []3L   []4L    []5L   []6L     Use home O2? [] Yes [x] No  Perform O2 challenge test using  smartphrase (.Homeoxygen)      Last bowel movement Last Bowel Movement Date: 11/05/17      Urinary Catheter             LDAs             Venous Access Device right chest [facility] 11/05/17 Upper chest (subclavicular area, right (Active)   Central Line Being Utilized Yes 11/12/2017  6:28 AM   Criteria for Appropriate Use Limited/no vessel suitable for conventional peripheral access 11/12/2017  6:28 AM   Site Assessment Clean, dry, & intact 11/12/2017  6:28 AM   Date of Last Dressing Change 11/12/17 11/12/2017  6:28 AM   Dressing Status New;Clean, dry, & intact 11/12/2017  6:28 AM   Dressing Type Disk with Chlorhexadine gluconate (CHG);Tape;Transparent 11/12/2017  6:28 AM   Action Taken Dressing changed 11/12/2017  6:28 AM   Date Accessed (Medial Site) 11/05/17 11/12/2017  6:28 AM   Access Time (Medial Site) 2255 11/5/2017 10:59 PM   Access Needle Size (Site #1) 20 G 11/10/2017  3:09 AM   Access Needle Length (Medial Site) 1 inch 11/10/2017  3:09 AM   Positive Blood Return (Medial Site) Yes 11/12/2017  6:28 AM   Action Taken (Medial Site) Flushed 11/12/2017  6:28 AM   Alcohol Cap Used Yes 11/12/2017  6:28 AM                           Readmission Risk Assessment Tool Score Low Risk            11       Total Score        3 Has Seen PCP in Last 6 Months (Yes=3, No=0)    2 . Living with Significant Other. Assisted Living. LTAC. SNF. or   Rehab    3 Patient Length of Stay (>5 days = 3)    3 Charlson Comorbidity Score (Age + Comorbid Conditions)        Criteria that do not apply:    IP Visits Last 12 Months (1-3=4, 4=9, >4=11)    Pt. Coverage (Medicare=5 , Medicaid, or Self-Pay=4)       Expected Length of Stay 3d 2h   Actual Length of Stay 7          Opportunity for questions and clarifications were given to the incoming nurse. Patient's bed is in low position, side rails x2, door open PRN, call bell within reach and patient not in distress.       Nayely Jimenez RN

## 2017-11-12 NOTE — PROGRESS NOTES
General Daily Progress Note    Admit Date: 11/5/2017  Hospital day 8    Subjective:     Patient has improving cough,wheezing ,dyspnea. .   Medication side effects: none    Current Facility-Administered Medications   Medication Dose Route Frequency    fluticasone-vilanterol (BREO ELLIPTA) 100mcg-25mcg/puff  1 Puff Inhalation DAILY    albuterol-ipratropium (DUO-NEB) 2.5 MG-0.5 MG/3 ML  3 mL Nebulization Q4H RT    aluminum & magnesium hydroxide-simethicone (MYLANTA II) oral suspension 30 mL  30 mL Oral Q4H PRN    guaiFENesin ER (MUCINEX) tablet 600 mg  600 mg Oral Q12H    methylPREDNISolone (PF) (SOLU-MEDROL) injection 40 mg  40 mg IntraVENous Q6H    gabapentin (NEURONTIN) capsule 200 mg  200 mg Oral BID    lisinopril (PRINIVIL, ZESTRIL) tablet 10 mg  10 mg Oral DAILY    umeclidinium (INCRUSE ELLIPTA) 62.5 mcg/actuation  1 Puff Inhalation DAILY    albuterol (PROVENTIL VENTOLIN) nebulizer solution 2.5 mg  2.5 mg Nebulization Q4H PRN    nicotine (NICODERM CQ) 7 mg/24 hr patch 1 Patch  1 Patch TransDERmal DAILY    sodium chloride (NS) flush 5-10 mL  5-10 mL IntraVENous Q8H    sodium chloride (NS) flush 5-10 mL  5-10 mL IntraVENous PRN    acetaminophen (TYLENOL) tablet 650 mg  650 mg Oral Q4H PRN    ondansetron (ZOFRAN) injection 4 mg  4 mg IntraVENous Q4H PRN    enoxaparin (LOVENOX) injection 40 mg  40 mg SubCUTAneous Q24H    aspirin chewable tablet 81 mg  81 mg Oral DAILY    klack's mouthwash oral suspension (COMPOUNDED)  5 mL Oral QID    diazePAM (VALIUM) tablet 5 mg  5 mg Oral Q1H PRN    diazePAM (VALIUM) tablet 10 mg  10 mg Oral H7N PRN    folic acid (FOLVITE) tablet 1 mg  1 mg Oral DAILY    thiamine (B-1) tablet 100 mg  100 mg Oral DAILY    therapeutic multivitamin (THERAGRAN) tablet 1 Tab  1 Tab Oral DAILY        Review of Systems  Constitutional: positive for fatigue and malaise  Respiratory: positive for cough, wheezing, dyspnea on exertion or stridor  Cardiovascular: negative  Gastrointestinal: negative  Neurological: positive for hearing loss    Objective:     Patient Vitals for the past 8 hrs:   BP Temp Pulse Resp SpO2   11/12/17 0314 149/76 97.9 °F (36.6 °C) 85 18 98 %             Physical Exam:   Visit Vitals    /76 (BP 1 Location: Left arm, BP Patient Position: At rest)    Pulse 85    Temp 97.9 °F (36.6 °C)    Resp 18    Ht 6' (1.829 m)    Wt 170 lb (77.1 kg)    SpO2 98%    BMI 23.06 kg/m2     General appearance: alert, fatigued, cooperative, no distress, appears stated age  Lungs: wheezes R base, L base  Heart: regular rate and rhythm  Abdomen: soft, non-tender. Bowel sounds normal. No masses,  no organomegaly  Extremities: extremities normal, atraumatic, no cyanosis or edema           Data Review No results found for this or any previous visit (from the past 24 hour(s)). Assessment:     Active Problems:    Acute respiratory distress (11/5/2017)      COPD exacerbation (HCC) (11/5/2017)        Plan:     Slowly improving dyspnea and cough. Continue current meds and treatments.   Italia Castilloter rounding for  Dr Horton Scale

## 2017-11-12 NOTE — PROGRESS NOTES
ADULT PROTOCOL: JET AEROSOL  REASSESSMENT    Patient  Seth Banks     63 y.o.   male     11/12/2017  9:10 AM    Breath Sounds Pre Procedure: Right Breath Sounds: Expiratory wheezing                               Left Breath Sounds: Expiratory wheezing    Breath Sounds Post Procedure: Right Breath Sounds: Expiratory wheezing                                 Left Breath Sounds: Expiratory wheezing    Breathing pattern: Pre procedure Breathing Pattern: Tachypneic          Post procedure Breathing Pattern: Tachypneic    Heart Rate: Pre procedure Pulse: 93           Post procedure Pulse: 90    Resp Rate: Pre procedure Respirations: 26           Post procedure Respirations: 26    Peak Flow: Pre bronchodilator   N/A          Post bronchodilator   N/A    FVC/FEV1:  N/A    Incentive Spirometry:   N/A          Cough: Pre procedure  non               Post procedure  non    Suctioned: NO    Sputum: Pre procedure  N/A                 Post procedure  N/A    Oxygen: O2 Device: Room air        Changed: NO    SpO2: Pre procedure SpO2: 98 %                Post procedure SpO2: 96 %     Nebulizer Therapy: Current medications Aerosolized Medications: DuoNeb Q4      Changed: Yes to qid    Smoking History: {Smoker    Problem List:   Patient Active Problem List   Diagnosis Code    Hypertension I10    COPD (chronic obstructive pulmonary disease) ([facility]) J44.9    Asthma J45.909    Hyperlipidemia E78.5    Hyponatremia E87.1    Acute renal failure ([facility]) N17.9    Osteoarthritis of right hip M16.11    DJD (degenerative joint disease) of hip M16.9    Esophageal obstruction due to food impaction K22.2, N11.032Z    B12 deficiency E53.8    S/P bronchoscopy with biopsy Z98.890    Chronic coronary artery disease I25.10    Gastroesophageal reflux disease with esophagitis K21.0    Cancer of lung ([facility]) C34.90    Lung cancer ([facility]) C34.90    Acute respiratory distress R06.03    COPD exacerbation ([facility]) J44.1       Respiratory Therapist: April Mariano V, RT

## 2017-11-13 NOTE — PROGRESS NOTES
CM received consult for patient to be discharged to SNF for rehab. [place] of choice has been offered to patient and his wife and list of SNF's provided. Referral sent via CC to [facility]. Copy of [facility] letter placed on chart. Awaiting insurance authorization prior to patient discharge.     Kalina Gardner RN, BSN, ACM   - Medical Oncology  [phone]

## 2017-11-13 NOTE — PROGRESS NOTES
Hematology Oncology Progress Note       Follow up for: NSCLC    Chart notes reviewed since last visit. Case discussed with following:    Patient complains of the following: markedly winded with efforts to walk with PT. He is extremely winded and breathing hard but O2 sats are actually okay. Additional concerns noted by the staff:     Patient Vitals for the past 24 hrs:   BP Temp Pulse Resp SpO2   11/13/17 1607 107/75 98.2 °F (36.8 °C) (!) 124 20 98 %   11/13/17 1439 - - - - 97 %   11/13/17 1152 143/90 97.9 °F (36.6 °C) (!) 103 24 97 %   11/13/17 1129 - - - - 98 %   11/13/17 0851 153/83 - 91 - -   11/13/17 0721 - - - - 97 %   11/13/17 0708 148/80 97.8 °F (36.6 °C) 87 26 97 %   11/13/17 0253 145/83 98.2 °F (36.8 °C) 87 20 100 %   11/12/17 2358 130/70 98.3 °F (36.8 °C) 88 18 97 %   11/12/17 2042 - - - - 97 %   11/12/17 1958 134/81 97.9 °F (36.6 °C) 91 16 98 %       Review of Systems:  Done- 12 pt ROS                                                                  Physical Examination:  Constitutional Alert, cooperative, oriented. Mood and affect appropriate. Appears close to chronological age. Well nourished. Well developed. Head Normocephalic; no scars   Eyes Conjunctivae and sclerae are clear and without icterus. Pupils are reactive and equal.   ENMT Sinuses are nontender. No oral exudates, ulcers, masses, thrush or mucositis. Oropharynx clear. Tongue normal.   Neck Supple without masses or thyromegaly. No jugular venous distension. Hematologic/Lymphatic No petechiae or purpura. No tender or palpable lymph nodes noted   Respiratory Tachypneic, distant breadth sounds+ kayli   Cardiovascular Regular rate and rhythm of heart without murmurs, gallops or rubs. Chest / Line Site Chest is symmetric with no chest wall deformities. Abdomen Non-tender, non-distended, no masses, ascites or hepatosplenomegaly. Good bowel sounds. No guarding or rebound tenderness. No pulsatile masses.    Musculoskeletal No tenderness or swelling, normal range of motion without obvious weakness. Extremities No visible deformities, no cyanosis, clubbing or edema. Skin No rashes, scars, or lesions suggestive of malignancy. No petechiae, purpura, or ecchymoses. No excoriations. Neurologic No sensory or motor deficits but not specifically tested. Psychiatric Alert and oriented. Coherent speech. Verbalizes understanding of our discussions today. Labs:  Recent Results (from the past 24 hour(s))   CBC WITH AUTOMATED DIFF    Collection Time: 11/13/17  4:26 AM   Result Value Ref Range    WBC 10.0 4.1 - 11.1 K/uL    RBC 3.76 (L) 4.10 - 5.70 M/uL    HGB 13.0 12.1 - 17.0 g/dL    HCT 36.1 (L) 36.6 - 50.3 %    MCV 96.0 80.0 - 99.0 FL    MCH 34.6 (H) 26.0 - 34.0 PG    MCHC 36.0 30.0 - 36.5 g/dL    RDW 13.7 11.5 - 14.5 %    PLATELET 214 958 - 814 K/uL    NEUTROPHILS 91 (H) 32 - 75 %    LYMPHOCYTES 2 (L) 12 - 49 %    MONOCYTES 7 5 - 13 %    EOSINOPHILS 0 0 - 7 %    BASOPHILS 0 0 - 1 %    ABS. NEUTROPHILS 9.1 (H) 1.8 - 8.0 K/UL    ABS. LYMPHOCYTES 0.2 (L) 0.8 - 3.5 K/UL    ABS. MONOCYTES 0.7 0.0 - 1.0 K/UL    ABS. EOSINOPHILS 0.0 0.0 - 0.4 K/UL    ABS. BASOPHILS 0.0 0.0 - 0.1 K/UL   METABOLIC PANEL, COMPREHENSIVE    Collection Time: 11/13/17  4:26 AM   Result Value Ref Range    Sodium 136 136 - 145 mmol/L    Potassium 4.0 3.5 - 5.1 mmol/L    Chloride 102 97 - 108 mmol/L    CO2 26 21 - 32 mmol/L    Anion gap 8 5 - 15 mmol/L    Glucose 137 (H) 65 - 100 mg/dL    BUN 51 (H) 6 - 20 MG/DL    Creatinine 1.18 0.70 - 1.30 MG/DL    BUN/Creatinine ratio 43 (H) 12 - 20      GFR est AA >60 >60 ml/min/1.73m2    GFR est non-AA >60 >60 ml/min/1.73m2    Calcium 8.0 (L) 8.5 - 10.1 MG/DL    Bilirubin, total 0.5 0.2 - 1.0 MG/DL    ALT (SGPT) 50 12 - 78 U/L    AST (SGOT) 33 15 - 37 U/L    Alk.  phosphatase 47 45 - 117 U/L    Protein, total 6.0 (L) 6.4 - 8.2 g/dL    Albumin 2.9 (L) 3.5 - 5.0 g/dL    Globulin 3.1 2.0 - 4.0 g/dL    A-G Ratio 0.9 (L) 1.1 - 2.2 Assessment and Plan:   NSCLC - opdivo on hold for now given current high dose steroid usage. Slowly improving. Palliative Care on board today    Abrupt onset loss of hearing - followup with Dr. Albino Mcleod missed  Last Tuesday. Will see if he can be seen here given plans for inpt rehab    Falls - on telemetry to see if associated with arrhythmia. Severe COPD with acute exacerbation - currently being treated.

## 2017-11-13 NOTE — PROGRESS NOTES
Problem: Mobility Impaired (Adult and Pediatric)  Goal: *Acute Goals and Plan of Care (Insert Text)  Physical Therapy Goals  Initiated 11/7/2017  1. Patient will move from supine to sit and sit to supine , scoot up and down and roll side to side in bed with independence within 7 day(s). 2.  Patient will transfer from bed to chair and chair to bed with independence using the least restrictive device within 7 day(s). 3.  Patient will perform sit to stand with independence within 7 day(s). 4.  Patient will ambulate with modified independence for 100 feet with the least restrictive device within 7 day(s). physical Therapy TREATMENT  Patient: Davy Recio (27 y.o. male)  Date: 11/13/2017  Diagnosis: COPD exacerbation ([address] Acute respiratory distress        Precautions: Fall  Chart, physical therapy assessment, plan of care and goals were reviewed. ASSESSMENT: pt mobility statis has taken a turn for the worse, not following commands, he just sits there and looks at you, cannot stand or take steps, in current condition will need a nanette lift at home, will need SNF rehab before going home. Progression toward goals:  []      Improving appropriately and progressing toward goals  []      Improving slowly and progressing toward goals  [x]      Not making progress toward goals and plan of care will be adjusted     PLAN:  Patient continues to benefit from skilled intervention to address the above impairments. Continue treatment per established plan of care.   Discharge Recommendations:  Thomas Amador  Further Equipment Recommendations for Discharge:  bedside commode, mechanical lift and rolling walker     OBJECTIVE DATA SUMMARY:     Critical Behavior:  Neurologic State: Alert  Orientation Level: Oriented to person, Oriented to place, Oriented to situation  Cognition: Follows commands  Safety/Judgement: Awareness of environment, Fall prevention, Home safety     Functional Mobility Training:  Bed Mobility:  Supine to Sit: Assist x2;Total assistance  Scooting: Total assistance;Assist x1  Level of Assistance: Total assistance   Interventions: Tactile cues; Verbal cues;Manual cues     Transfers:  Sit to Stand: Maximum assistance;Assist x2  Stand to Sit: Maximum assistance;Assist x2  Stand Pivot Transfers: Maximum assistance  Bed to Chair: Maximum assistance; Total assistance  Interventions: Manual cues; Tactile cues; Verbal cues  Level of Assistance: Maximum assistance     Balance:  Sitting: Impaired; With support  Sitting - Static: Poor (constant support)  Sitting - Dynamic: Not tested  Standing - Static: Poor;Constant support  Standing - Dynamic : Poor     Ambulation/Gait Training: unable    Pain:  Pain Scale 1: Numeric (0 - 10)  Pain Intensity 1: 0    Activity Tolerance: poor    After treatment:   [x] Patient left in no apparent distress sitting up in chair  [] Patient left in no apparent distress in bed  [x] Call bell left within reach  [x] Nursing notified  [x] Caregiver present  [] Bed alarm activated    COMMUNICATION/COLLABORATION:   The patients plan of care was discussed with: Registered Nurse    Tres Jackson PTA   Time Calculation: (P) 25 mins

## 2017-11-13 NOTE — PROGRESS NOTES
Oncology Nursing Communication Tool  7:39 AM  11/13/2017     Bedside and Verbal shift change report given to Sergei Fraser RN (incoming nurse) by John Chong (outgoing nurse) on Neela Petty. Report included the following information SBAR, Kardex, Intake/Output, MAR, Accordion and Recent Results. Significant changes during shift: none      Issues for physician to address: none       Oncology Shift Note   Admission Date 11/5/2017   Admission Diagnosis COPD exacerbation ([facility] [address]  Acute respiratory distress   Code Status Partial Code   Consults IP CONSULT TO PULMONOLOGY  IP CONSULT TO HEMATOLOGY  IP CONSULT TO OTOLARYNGOLOGY  IP CONSULT TO PALLIATIVE CARE - PROVIDER      Cardiac Monitoring [x] Yes [] No      Purposeful Hourly Rounding [x] Yes    Robert Score Total Score: 4   Robert score 3 or > [] Bed Alarm [] Avasys [] 1:1 sitter [] Patient refused (Place signed refusal form in chart)      Pain Managed [x] Yes [] No    Key Pain Meds             aspirin (ASPIRIN) 325 mg tablet  (Taking) Take 81 mg by mouth daily. Influenza Vaccine Received Flu Vaccine for Current Season (usually Sept-March): Yes (august 2017)           Oxygen needs?  [x] Room air Oxygen @  []1L    []2L    []3L   []4L    []5L   []6L     Use home O2? [] Yes [] No  Perform O2 challenge test using  smartphrase (.Homeoxygen)      Last bowel movement Last Bowel Movement Date: 11/05/17      Urinary Catheter             LDAs             Venous Access Device right chest [facility] 11/05/17 Upper chest (subclavicular area, right (Active)   Central Line Being Utilized Yes 11/13/2017  3:21 AM   Criteria for Appropriate Use Limited/no vessel suitable for conventional peripheral access 11/13/2017  3:21 AM   Site Assessment Clean, dry, & intact 11/13/2017  3:21 AM   Date of Last Dressing Change 11/12/17 11/13/2017  3:21 AM   Dressing Status Clean, dry, & intact 11/13/2017  3:21 AM   Dressing Type Disk with Chlorhexadine gluconate (CHG);Tape;Transparent 11/13/2017  3:21 AM   Action Taken Dressing changed 11/12/2017  6:28 AM   Date Accessed (Medial Site) 11/05/17 11/12/2017  6:28 AM   Access Time (Medial Site) 3655 11/5/2017 10:59 PM   Access Needle Size (Site #1) 20 G 11/10/2017  3:09 AM   Access Needle Length (Medial Site) 1 inch 11/10/2017  3:09 AM   Positive Blood Return (Medial Site) Yes 11/13/2017  3:21 AM   Action Taken (Medial Site) Flushed 11/13/2017  3:21 AM   Alcohol Cap Used Yes 11/12/2017  6:28 AM                           Readmission Risk Assessment Tool Score Low Risk            11       Total Score        3 Has Seen PCP in Last 6 Months (Yes=3, No=0)    2 . Living with Significant Other. Assisted Living. LTAC. SNF. or   Rehab    3 Patient Length of Stay (>5 days = 3)    3 Charlson Comorbidity Score (Age + Comorbid Conditions)        Criteria that do not apply:    IP Visits Last 12 Months (1-3=4, 4=9, >4=11)    Pt. Coverage (Medicare=5 , Medicaid, or Self-Pay=4)       Expected Length of Stay 3d 2h   Actual Length of Stay 8          Opportunity for questions and clarifications were given to the incoming nurse. Patient's bed is in low position, side rails x2, door open PRN, call bell within reach and patient not in distress.       Estela Hall

## 2017-11-13 NOTE — DISCHARGE INSTRUCTIONS
PATIENT DISCHARGE INSTRUCTIONS      PATIENT DISCHARGE INSTRUCTIONS    Sandra Clement / [id number] : 1955    Admitted 2017 Discharged: 2017       · It is important that you take the medication exactly as they are prescribed. · Keep your medication in the bottles provided by the pharmacist and keep a list of the medication names, dosages, and times to be taken in your wallet. · Do not take other medications without consulting your doctor.      What to do at Home    Recommended Diet: Regular Diet    Recommended Activity: PT/OT Eval and Treat        Signed By: Nathen Rosenberg MD     2017

## 2017-11-13 NOTE — PROGRESS NOTES
Oncology Nursing Communication Tool  7:49 PM  11/12/2017     Bedside and Verbal shift change report given to Anuradha Branch RN (incoming nurse) by Monica De Los Santos RN (outgoing nurse) on Julee Nicks. Report included the following information SBAR, Kardex, Intake/Output, MAR, Accordion and Recent Results. Significant changes during shift: Dyspneic at rest, unable to ambulate or transfer. Poor PO intake      Issues for physician to address: Palliative         Oncology Shift Note   Admission Date 11/5/2017   Admission Diagnosis COPD exacerbation ([facility] [address]  Acute respiratory distress   Code Status Partial Code   Consults IP CONSULT TO PULMONOLOGY  IP CONSULT TO HEMATOLOGY  IP CONSULT TO OTOLARYNGOLOGY  IP CONSULT TO PALLIATIVE CARE - PROVIDER      Cardiac Monitoring [x] Yes [] No      Purposeful Hourly Rounding [x] Yes    Robert Score Total Score: 4   Robert score 3 or > [x] Bed Alarm [] Avasys [] 1:1 sitter [] Patient refused (Place signed refusal form in chart)      Pain Managed [x] Yes [] No    Key Pain Meds             aspirin (ASPIRIN) 325 mg tablet  (Taking) Take 81 mg by mouth daily. Influenza Vaccine Received Flu Vaccine for Current Season (usually Sept-March): Yes (august 2017)           Oxygen needs?  [x] Room air Oxygen @  []1L    []2L    []3L   []4L    []5L   []6L     Use home O2? [] Yes [x] No  Perform O2 challenge test using  smartphrase (.Homeoxygen)      Last bowel movement Last Bowel Movement Date: 11/05/17      Urinary Catheter             LDAs             Venous Access Device right chest [facility] 11/05/17 Upper chest (subclavicular area, right (Active)   Central Line Being Utilized Yes 11/12/2017  3:15 PM   Criteria for Appropriate Use Limited/no vessel suitable for conventional peripheral access 11/12/2017  3:15 PM   Site Assessment Clean, dry, & intact 11/12/2017  3:15 PM   Date of Last Dressing Change 11/12/17 11/12/2017  3:15 PM   Dressing Status Clean, dry, & intact 11/12/2017 3:15 PM   Dressing Type Disk with Chlorhexadine gluconate (CHG); Transparent 11/12/2017  3:15 PM   Action Taken Dressing changed 11/12/2017  6:28 AM   Date Accessed (Medial Site) 11/05/17 11/12/2017  6:28 AM   Access Time (Medial Site) 2255 11/5/2017 10:59 PM   Access Needle Size (Site #1) 20 G 11/10/2017  3:09 AM   Access Needle Length (Medial Site) 1 inch 11/10/2017  3:09 AM   Positive Blood Return (Medial Site) Yes 11/12/2017  3:15 PM   Action Taken (Medial Site) Flushed 11/12/2017  6:28 AM   Alcohol Cap Used Yes 11/12/2017  6:28 AM                           Readmission Risk Assessment Tool Score Low Risk            11       Total Score        3 Has Seen PCP in Last 6 Months (Yes=3, No=0)    2 . Living with Significant Other. Assisted Living. LTAC. SNF. or   Rehab    3 Patient Length of Stay (>5 days = 3)    3 Charlson Comorbidity Score (Age + Comorbid Conditions)        Criteria that do not apply:    IP Visits Last 12 Months (1-3=4, 4=9, >4=11)    Pt. Coverage (Medicare=5 , Medicaid, or Self-Pay=4)       Expected Length of Stay 3d 2h   Actual Length of Stay 7          Opportunity for questions and clarifications were given to the incoming nurse. Patient's bed is in low position, side rails x2, door open PRN, call bell within reach and patient not in distress.       Edilia Page RN

## 2017-11-13 NOTE — PROGRESS NOTES
ADULT PROTOCOL: JET AEROSOL  REASSESSMENT    Patient  Colby Garcia     63 y.o.   male     11/13/2017  7:34 AM    Breath Sounds Pre Procedure: Right Breath Sounds: Diminished, Crackles, Wheezing                               Left Breath Sounds: Diminished, Crackles, Wheezing    Breath Sounds Post Procedure: Right Breath Sounds: Crackles, Wheezing                                 Left Breath Sounds: Crackles, Wheezing    Breathing pattern: Pre procedure Breathing Pattern: Regular          Post procedure Breathing Pattern: Regular    Heart Rate: Pre procedure Pulse: 86           Post procedure Pulse: 88    Resp Rate: Pre procedure Respirations: 20           Post procedure Respirations: 20            Cough: Pre procedure Cough: Moist, Non-productive               Post procedure Cough: Moist, Non-productive      Oxygen: O2 Device: Room air   room air     Changed: NO    SpO2: Pre procedure SpO2: 97 %   without oxygen              Post procedure SpO2: 98 %  without oxygen    Nebulizer Therapy: Current medications Aerosolized Medications: DuoNeb      Changed: NO    Smoking History: current smoker    Problem List:   Patient Active Problem List   Diagnosis Code    Hypertension I10    COPD (chronic obstructive pulmonary disease) ([facility]) J44.9    Asthma J45.909    Hyperlipidemia E78.5    Hyponatremia E87.1    Acute renal failure ([facility]) N17.9    Osteoarthritis of right hip M16.11    DJD (degenerative joint disease) of hip M16.9    Esophageal obstruction due to food impaction K22.2, G13.807Y    B12 deficiency E53.8    S/P bronchoscopy with biopsy Z98.890    Chronic coronary artery disease I25.10    Gastroesophageal reflux disease with esophagitis K21.0    Cancer of lung ([facility]) C34.90    Lung cancer ([facility]) C34.90    Acute respiratory distress R06.03    COPD exacerbation ([facility]) J44.1       Respiratory Therapist: Bridget Del Rio, RT

## 2017-11-13 NOTE — CONSULTS
Palliative Medicine Consult  Tom: [phone] (1959)    Patient Name: Chloe Hays  YOB: 1955    Date of Initial Consult: 11/13/17  Reason for Consult: overwhelming symptoms  Requesting Provider: Tammy Medina   Primary Care Physician: Jamal De Leon MD     SUMMARY:   Chloe Hays is a 63 y.o. with a past history of COPD, NSCLC undergoing treatment with biologic agent and sp VATS resection, arthritis with LBP and radiculopathy, recent weakness and frequent falls, who was admitted on 11/5/2017 from home with a diagnosis of AE COPD, weakness, falls. Current medical issues leading to Palliative Medicine involvement include: acute and chronic progressive medical illness, overwhelming symptoms. PALLIATIVE DIAGNOSES:   1. Shortness of breath  2. Low back pain, radiculopathy  3. Debility  4. Nausea - intermittent  5. AE COPD  6. NSCLC       PLAN:   1. Schedule tylenol, lidocaine patch, continue gabapentin  2. Consider trial capsaicin  3. Continue maximal medical tx for COPD, nebs, steroids  4. Continue efforts at strengthening, even minimal improvement may help with pain / balance  5. PRN antiemetics  6. If above efforts not successful, my next suggestion for pain / sob would be low dose systemic opioid  7. Discussed above with pt and wife, conversation with pt severely hampered by [facility]  8. Pt has indicated no CPR if cardiac / respiratory arrest; will discuss with him about whether he wants to complete durable DNR  9. Goals: continued efforts at rehab / strengthening, work on symptoms, try to get back to more functional / steady state, resume cancer treatment if possible; if cardiac arrest no CPR, would be ok with short term intubation  10. ADDENDUM: met with pt and wife and reviewed with them durable DNR form, they did not want to sign at this time, they will consider further  11. Initial consult note routed to primary continuity provider  12.  Communicated plan of care with: Palliative IDT GOALS OF CARE / TREATMENT PREFERENCES:   [====Goals of Care====]  GOALS OF CARE:  Patient / health care proxy stated goals: continued efforts at rehab / strengthening, work on symptoms, try to get back to more functional / steady state, resume cancer treatment if possible; if cardiac arrest no CPR, would be ok with short term intubation      TREATMENT PREFERENCES:   Code Status: Partial Code    Advance Care Planning:  Advance Care Planning 11/13/2017   Patient's Healthcare Decision Maker is: Legal Next of Josr Last   Primary Decision Maker Name Eduardo Singer   Primary Decision Maker Phone Number [phone]   Primary Decision Maker Relationship to Patient Spouse   Confirm Advance Directive None   Patient Would Like to Complete Advance Directive -       Other:    The palliative care team has discussed with patient / health care proxy about goals of care / treatment preferences for patient.  [====Goals of Care====]         HISTORY:     History obtained from: pt, chart, wife    CHIEF COMPLAINT: short of breath    HPI/SUBJECTIVE:    The patient is:   [x] Verbal and participatory  [] Non-participatory due to:   63 yom with above history and current issues. On assessment, he is verbal but hx is severely limited by [facility]. Pt actually denies any pain at this time. Per wife, he's had hx of low back pain radiating to legs, which is thought to be arthritis. We reviewed various tx he's tried including tylenol (some benefit). Moderate dyspnea and fatigue. Intermittent nausea.      Clinical Pain Assessment (nonverbal scale for severity on nonverbal patients):   [++++ Clinical Pain Assessment++++]  [++++Pain Severity++++]: Pain: 0 (below is best description I can get for when pt has pain)  [++++Pain Character++++]: ache  [++++Pain Duration++++]: months  [++++Pain Effect++++]: hard to get oob  [++++Pain Factors++++]: better somewhat with tylenol  [++++Pain Frequency++++]: daily, but apparently intermittent  [++++Pain Location++++]: low back, down leg occasionally  [++++ Clinical Pain Assessment++++]  Duration: for how long has pt been experiencing pain (e.g., 2 days, 1 month, years)  Frequency: how often pain is an issue (e.g., several times per day, once every few days, constant)     FUNCTIONAL ASSESSMENT:     Palliative Performance Scale (PPS):  PPS: 40       PSYCHOSOCIAL/SPIRITUAL SCREENING:     Advance Care Planning:  Advance Care Planning 11/13/2017   Patient's Healthcare Decision Maker is: Legal Next of Josr Last   Primary Decision Maker Name Harish Rodriguez   Primary Decision Maker Phone Number [phone]   Primary Decision Maker Relationship to Patient Spouse   Confirm Advance Directive None   Patient Would Like to Complete Advance Directive -        Any spiritual / Protestant concerns:  [] Yes /  [x] No    Caregiver Burnout:  [] Yes /  [x] No /  [] No Caregiver Present      Anticipatory grief assessment:   [x] Normal  / [] Maladaptive       ESAS Anxiety: Anxiety: 0    ESAS Depression: Depression: 0        REVIEW OF SYSTEMS:     Positive and pertinent negative findings in ROS are noted above in HPI. The following systems were [x] reviewed / [] unable to be reviewed as noted in HPI  Other findings are noted below. Systems: constitutional, ears/nose/mouth/throat, respiratory, gastrointestinal, genitourinary, musculoskeletal, integumentary, neurologic, psychiatric, endocrine. Positive findings noted below. Modified ESAS Completed by: provider   Fatigue: 5 Drowsiness: 0   Depression: 0 Pain: 0   Anxiety: 0 Nausea: 2   Anorexia: 0 Dyspnea: 5     Constipation: No     Stool Occurrence(s): 0        PHYSICAL EXAM:     From RN flowsheet:  Wt Readings from Last 3 Encounters:   11/05/17 170 lb (77.1 kg)   10/09/17 157 lb (71.2 kg)   08/04/17 157 lb 9.6 oz (71.5 kg)     Blood pressure 153/83, pulse 91, temperature 97.8 °F (36.6 °C), resp. rate 26, height 6' (1.829 m), weight 170 lb (77.1 kg), SpO2 97 %.     Pain Scale 1: Numeric (0 - 10)  Pain Intensity 1: 0     Pain Location 1: Leg  Pain Orientation 1: Right  Pain Description 1: Aching  Pain Intervention(s) 1: Medication (see MAR)  Last bowel movement, if known:     Constitutional: aa, labored breathing  Eyes: pupils equal, anicteric  ENMT: no nasal discharge, dry mucous membranes  Cardiovascular: regular rhythm, distal pulses intact  Respiratory: breathing labored, symmetric  Gastrointestinal: soft non-tender, +bowel sounds  Musculoskeletal: no deformity, no tenderness to palpation  Skin: warm, dry  Neurologic: following commands, moving all extremities, LE are weak bilaterally has trouble raising against gravity  Psychiatric: restricted affect, no hallucinations  Other:       HISTORY:     Active Problems:    Acute respiratory distress (11/5/2017)      COPD [facility]ation ([facility] (11/5/2017)      Past Medical History:   Diagnosis Date    Arthritis     Asthma 5/21/2010    B12 deficiency 2/19/2016    Cancer ([facility]     Lung    COPD (chronic obstructive pulmonary disease) ([facility] 5/21/2010    asthma, uses inhalers    Hyperlipidemia 5/21/2010    Hypertension 5/21/2010    Other ill-defined conditions(799.89) 10/31/13    CURRENTLY BEING TRX FOR COLD    Other ill-defined conditions(799.89)     AVASCULAR NECROSIS OF HIPS/ bilateral hip replacement       Past Surgical History:   Procedure Laterality Date    HX GI      COLONOSCOPY    HX HEENT      WISDOM TEETH    HX HIP REPLACEMENT Right 2014    Dr. Brittany Dumont HX HIP REPLACEMENT  11/2014    L hip    HX LUMBAR LAMINECTOMY  1990s    HX ORTHOPAEDIC  25 years ago    lumbar laminectomy    HX ORTHOPAEDIC      left hip replacement    HX OTHER SURGICAL      egd    HX OTHER SURGICAL      bronchoscopy with biopsy    TOTAL HIP ARTHROPLASTY Right 11/2013      Family History   Problem Relation Age of Onset    Diabetes Father      father    Lung Disease Mother      COPD    Hypertension Mother     Obesity Sister     Anesth Problems Neg Hx       History reviewed, no pertinent family history.   Social History   Substance Use Topics    Smoking status: Current Every Day Smoker     Packs/day: 0.25     Years: 30.00     Types: Cigarettes    Smokeless tobacco: Never Used    Alcohol use 12.0 oz/week     24 Cans of beer per week      Comment: per week     Allergies   Allergen Reactions    Amoxicillin Other (comments)     Headache    Hydrochlorothiazide Other (comments)     Hyponatremia      Current Facility-Administered Medications   Medication Dose Route Frequency    albuterol-ipratropium (DUO-NEB) 2.5 MG-0.5 MG/3 ML  3 mL Nebulization QID RT    fluticasone-vilanterol (BREO ELLIPTA) 100mcg-25mcg/puff  1 Puff Inhalation DAILY    aluminum & magnesium hydroxide-simethicone (MYLANTA II) oral suspension 30 mL  30 mL Oral Q4H PRN    guaiFENesin ER (MUCINEX) tablet 600 mg  600 mg Oral Q12H    methylPREDNISolone (PF) (SOLU-MEDROL) injection 40 mg  40 mg IntraVENous Q6H    gabapentin (NEURONTIN) capsule 200 mg  200 mg Oral BID    lisinopril (PRINIVIL, ZESTRIL) tablet 10 mg  10 mg Oral DAILY    umeclidinium (INCRUSE ELLIPTA) 62.5 mcg/actuation  1 Puff Inhalation DAILY    albuterol (PROVENTIL VENTOLIN) nebulizer solution 2.5 mg  2.5 mg Nebulization Q4H PRN    nicotine (NICODERM CQ) 7 mg/24 hr patch 1 Patch  1 Patch TransDERmal DAILY    sodium chloride (NS) flush 5-10 mL  5-10 mL IntraVENous Q8H    sodium chloride (NS) flush 5-10 mL  5-10 mL IntraVENous PRN    acetaminophen (TYLENOL) tablet 650 mg  650 mg Oral Q4H PRN    ondansetron (ZOFRAN) injection 4 mg  4 mg IntraVENous Q4H PRN    enoxaparin (LOVENOX) injection 40 mg  40 mg SubCUTAneous Q24H    aspirin chewable tablet 81 mg  81 mg Oral DAILY    klack's mouthwash oral suspension (COMPOUNDED)  5 mL Oral QID    diazePAM (VALIUM) tablet 5 mg  5 mg Oral Q1H PRN    diazePAM (VALIUM) tablet 10 mg  10 mg Oral H1P PRN    folic acid (FOLVITE) tablet 1 mg  1 mg Oral DAILY    thiamine (B-1) tablet 100 mg  100 mg Oral DAILY    therapeutic multivitamin (THERAGRAN) tablet 1 Tab  1 Tab Oral DAILY          LAB AND IMAGING FINDINGS:     Lab Results   Component Value Date/Time    WBC 10.0 11/13/2017 04:26 AM    HGB 13.0 11/13/2017 04:26 AM    PLATELET 606 77/09/9264 04:26 AM     Lab Results   Component Value Date/Time    Sodium 136 11/13/2017 04:26 AM    Potassium 4.0 11/13/2017 04:26 AM    Chloride 102 11/13/2017 04:26 AM    CO2 26 11/13/2017 04:26 AM    BUN 51 11/13/2017 04:26 AM    Creatinine 1.18 11/13/2017 04:26 AM    Calcium 8.0 11/13/2017 04:26 AM    Magnesium 1.7 11/06/2017 05:27 AM    Phosphorus 2.8 11/06/2017 05:27 AM      Lab Results   Component Value Date/Time    AST (SGOT) 33 11/13/2017 04:26 AM    Alk. phosphatase 47 11/13/2017 04:26 AM    Protein, total 6.0 11/13/2017 04:26 AM    Albumin 2.9 11/13/2017 04:26 AM    Globulin 3.1 11/13/2017 04:26 AM     Lab Results   Component Value Date/Time    INR 1.1 12/20/2014 10:15 PM    Prothrombin time 11.4 12/20/2014 10:15 PM      Lab Results   Component Value Date/Time    Ferritin 1413 06/30/2016 04:00 AM      Lab Results   Component Value Date/Time    pH 7.41 11/05/2017 06:45 PM    PCO2 37 11/05/2017 06:45 PM    PO2 86 11/05/2017 06:45 PM     No components found for: [address]   Lab Results   Component Value Date/Time    CK 75 11/05/2017 03:09 PM    CK - MB 2.4 11/05/2017 03:09 PM                Total time: 70min  Counseling / coordination time, spent as noted above: 40min  > 50% counseling / coordination?: y    Prolonged service was provided for  []30 min   []75 min in face to face time in the presence of the patient, spent as noted above. Time Start:   Time End:   Note: this can only be billed with 00652 (initial) or 10716 (follow up). If multiple start / stop times, list each separately.

## 2017-11-13 NOTE — PROGRESS NOTES
Oncology Interdisciplinary rounds were held today to discuss patient plan of care and outcomes. The following members were present: Nursing and Case Management.     Plan of Care:  Palliative consulted today, pulmonary rehab denied    Discharge Disposition: [place]

## 2017-11-13 NOTE — PROGRESS NOTES
S: Mr. Sherryle Revel was seen by me today during rounds. At this time, he is resting in bed. He is still SOB with a lot of wheezing. Has had hearing loss. Generalized weakness. Able to walk only a few steps to chair with PT.  ROS otherwise unremarkable except as noted elsewhere. O: Blood pressure 143/90, pulse (!) 103, temperature 97.9 °F (36.6 °C), resp. rate 24, height 6' (1.829 m), weight 170 lb (77.1 kg), SpO2 97 %. Gen: Patient is somewhat tachypnic. Lungs: Exp wheezing noted. Heart: RRR. Abd: S, NT, ND, BS present. Extremities: Warm. Recent Results (from the past 12 hour(s))   CBC WITH AUTOMATED DIFF    Collection Time: 11/13/17  4:26 AM   Result Value Ref Range    WBC 10.0 4.1 - 11.1 K/uL    RBC 3.76 (L) 4.10 - 5.70 M/uL    HGB 13.0 12.1 - 17.0 g/dL    HCT 36.1 (L) 36.6 - 50.3 %    MCV 96.0 80.0 - 99.0 FL    MCH 34.6 (H) 26.0 - 34.0 PG    MCHC 36.0 30.0 - 36.5 g/dL    RDW 13.7 11.5 - 14.5 %    PLATELET 567 343 - 815 K/uL    NEUTROPHILS 91 (H) 32 - 75 %    LYMPHOCYTES 2 (L) 12 - 49 %    MONOCYTES 7 5 - 13 %    EOSINOPHILS 0 0 - 7 %    BASOPHILS 0 0 - 1 %    ABS. NEUTROPHILS 9.1 (H) 1.8 - 8.0 K/UL    ABS. LYMPHOCYTES 0.2 (L) 0.8 - 3.5 K/UL    ABS. MONOCYTES 0.7 0.0 - 1.0 K/UL    ABS. EOSINOPHILS 0.0 0.0 - 0.4 K/UL    ABS. BASOPHILS 0.0 0.0 - 0.1 K/UL   METABOLIC PANEL, COMPREHENSIVE    Collection Time: 11/13/17  4:26 AM   Result Value Ref Range    Sodium 136 136 - 145 mmol/L    Potassium 4.0 3.5 - 5.1 mmol/L    Chloride 102 97 - 108 mmol/L    CO2 26 21 - 32 mmol/L    Anion gap 8 5 - 15 mmol/L    Glucose 137 (H) 65 - 100 mg/dL    BUN 51 (H) 6 - 20 MG/DL    Creatinine 1.18 0.70 - 1.30 MG/DL    BUN/Creatinine ratio 43 (H) 12 - 20      GFR est AA >60 >60 ml/min/1.73m2    GFR est non-AA >60 >60 ml/min/1.73m2    Calcium 8.0 (L) 8.5 - 10.1 MG/DL    Bilirubin, total 0.5 0.2 - 1.0 MG/DL    ALT (SGPT) 50 12 - 78 U/L    AST (SGOT) 33 15 - 37 U/L    Alk.  phosphatase 47 45 - 117 U/L    Protein, total 6.0 (L) 6.4 - 8.2 g/dL    Albumin 2.9 (L) 3.5 - 5.0 g/dL    Globulin 3.1 2.0 - 4.0 g/dL    A-G Ratio 0.9 (L) 1.1 - 2.2          CT Head: No change. No acute abnormality identified    CTA chest:   1. Mildly enlarged left hilar lymph node. 2. Left lower lobe bullous emphysema associated with scarring. Lumbar Spine MRI:   No evidence of osseous metastatic disease. No canal or foraminal stenosis. Mild effacement of nerve roots extending to the left S1 foramen. Echo:  Left ventricle: Systolic function was normal. Ejection fraction was  estimated to be 65 %. There were no regional wall motion abnormalities. A / P:   1. Acute respiratory distress (11/5/2017): On oxygen as needed, other measures as below. Pulmonology following. Still quite dyspneic. 2. COPD exacerbation ([facility] [address] (11/5/2017): Scheduled nebs, IV solumedrol. 3. Leg weakness: Neuro work up in progress. Able to ambulate now, but limited by respiratory status. 4. Squamous Cell Lung Cancer, unresectable: Opdivo on hold for last few weeks on fear that it is exacerbating his COPD. His wife is concerned about the long time off chemo--Dr. Bereket Bello consulted; I spoke with her about the current situation. Manus La would be \"useless\" while he is on steroids. 5. Elevated lactic acid. 6. Hearing loss: ?Due to chemo--not typical for opdivo. Discussed with Dr. Bereket Bello and we'll ask Dr. Imtiaz Diaz to see him before he leaves. 7. Hypertension: Controlled. 8. Falls/Deconditioning: PT / OT as breathing improves. 9. ETOH: CIWA protocol. 10. Mucositis  11. Code: Partial.   12. D/C planning: Inpatient Pulmonary Rehab denied by insurance (despite [id number]);  Will look into SNF

## 2017-11-13 NOTE — PROGRESS NOTES
Attempted to call Saige Coronado MD about patient wife refusing discharge to [facility]. Spoke to wife and she stated that because of the dramatic change in mental status/ increase and confusion. She stated she would like an \"MRI or CT to be done to make sure nothing has happened to his brain\".

## 2017-11-13 NOTE — PROGRESS NOTES
Discharge order and case management consult received. FOC offered to patient and his wife and wife made choice via telephone to send referral to [facility]. [facility] reported ability to obtain authorization and they can accept patient to their facility today. CM has left voice message informing patient's wife that patient can be transported to [facility] today. Will hold off arranging transportation until she returns phone call. CM has completed PCS form and placed on chart. Nursing will need to call [facility] at [phone] to finalize stretcher transport. Report will need to be called to [facility] at [phone]. Care Management Interventions  PCP Verified by CM:  Yes (Dr. Mcgrath [facility])  Mode of Transport at Discharge: BLS  Transition of Care Consult (CM Consult): SNF ([address])  Partner SNF: Yes  MyChart Signup: No  Discharge Durable Medical Equipment: No  Physical Therapy Consult: Yes  Occupational Therapy Consult: Yes  Speech Therapy Consult: No  Current Support Network: Lives with Spouse, Own Home (See previous notes)  Confirm Follow Up Transport: Family  Plan discussed with Pt/Family/Caregiver: Yes ([facility] offered and signed form placed on chart)  Freedom of Choice Offered: Yes ([facility] form placed on chart)  Discharge Location  Discharge Placement: Rehab Unit Subacute ([facility])    Kalina Gardner RN, BSN, [address] [place] [address] Oncology  [phone]

## 2017-11-14 NOTE — PROGRESS NOTES
Hematology Oncology Progress Note       Follow up for: NSCLC    Chart notes reviewed since last visit. Case discussed with following:    Patient complains of the following: markedly winded with efforts to walk with PT. He is extremely winded and breathing hard but O2 sats are actually okay. Today he is drowsy. Overnight he became drowsy and tachy- ABG ordered and Non con head CT done. Additional concerns noted by the staff:     Patient Vitals for the past 24 hrs:   BP Temp Pulse Resp SpO2   11/14/17 1225 118/66 97.7 °F (36.5 °C) 100 16 100 %   11/14/17 0817 124/74 97.8 °F (36.6 °C) 73 18 99 %   11/14/17 0813 - - - - 97 %   11/13/17 2358 124/79 98.3 °F (36.8 °C) 80 17 92 %   11/13/17 1607 107/75 98.2 °F (36.8 °C) (!) 124 20 98 %   11/13/17 1439 - - - - 97 %       Review of Systems:  Done- 12 pt ROS attempted, but poor mentation did not allow it                                                                  Physical Examination:  Constitutional Very drowsy, arousable, Total hearing loss. Head Normocephalic; no scars   Eyes Conjunctivae and sclerae are clear and without icterus. Pupils are reactive and equal.   ENMT Sinuses are nontender. No oral exudates, ulcers, masses, thrush or mucositis. Oropharynx clear. Tongue normal.   Neck Supple without masses or thyromegaly. No jugular venous distension. Hematologic/Lymphatic No petechiae or purpura. No tender or palpable lymph nodes noted   Respiratory Tachypneic, distant breadth sounds+ kayli   Cardiovascular Regular rate and rhythm of heart without murmurs, gallops or rubs. Chest / Line Site Chest is symmetric with no chest wall deformities. Abdomen Non-tender, non-distended, no masses, ascites or hepatosplenomegaly. Good bowel sounds. No guarding or rebound tenderness. No pulsatile masses. Musculoskeletal No tenderness or swelling, normal range of motion without obvious weakness. Extremities No visible deformities, no cyanosis, clubbing or edema. Skin No rashes, scars, or lesions suggestive of malignancy. No petechiae, purpura, or ecchymoses. No excoriations. Neurologic No sensory or motor deficits but not specifically tested. Psychiatric Alert and oriented. Coherent speech. Verbalizes understanding of our discussions today. Labs:  Recent Results (from the past 24 hour(s))   BLOOD GAS, ARTERIAL    Collection Time: 11/13/17  5:30 PM   Result Value Ref Range    pH 7.42 7.35 - 7.45      PCO2 31 (L) 35.0 - 45.0 mmHg    PO2 93 80 - 100 mmHg    O2 SAT 97 92 - 97 %    BICARBONATE 20 (L) 22 - 26 mmol/L    BASE DEFICIT 3.8 mmol/L    O2 METHOD ROOM AIR      Sample source ARTERIAL      SITE LEFT RADIAL      ADITI'S TEST YES           Assessment and Plan:   NSCLC - opdivo on hold for now given current high dose steroid usage. Remians on solumedrol at 40 mg iv Q6hrs- ? Endpoint    Encephalopathy - Drowsy. Noted non-con Head CT. Brought to my attention that he has ah/o meningioma. Consider Brain MRI with and w/o iv con- assess if Meningioma is larger and the cause of hearing loss. Palliative Care on board     Abrupt onset loss of hearing - followup with Dr. Albino Mcleod missed  Last Tuesday. Will see if he can be seen here given plans for inpt rehab. He is not likely to make any outpatient appt any time soon    Falls - on telemetry to see if associated with arrhythmia. Severe COPD with acute exacerbation - currently being treated.  ? Taper steroids    Will d/w Dr. Arianna Caceres

## 2017-11-14 NOTE — PROGRESS NOTES
Oncology Nursing Communication Tool  8:35 PM  11/13/2017     Bedside and Verbal shift change report given to Tamara Hopkins RN (incoming nurse) by Aaron Aase (outgoing nurse) on Davy Recio. Report included the following information SBAR, Kardex, Procedure Summary, Intake/Output, MAR and Accordion. Significant changes during shift: Pt confused throughout shift, more confused than weekend per wife      Issues for physician to address: Oncology Shift Note   Admission Date 11/5/2017   Admission Diagnosis COPD exacerbation ([facility] [place] [address]  Acute respiratory distress   Code Status Partial Code   Consults IP CONSULT TO PULMONOLOGY  IP CONSULT TO HEMATOLOGY  IP CONSULT TO OTOLARYNGOLOGY  IP CONSULT TO PALLIATIVE CARE - PROVIDER      Cardiac Monitoring [x] Yes [] No      Purposeful Hourly Rounding [x] Yes    Robert Score Total Score: 4   Robert score 3 or > [x] Bed Alarm [] Avasys [] 1:1 sitter [] Patient refused (Place signed refusal form in chart)      Pain Managed [x] Yes [] No    Key Pain Meds             aspirin (ASPIRIN) 325 mg tablet  (Taking) Take 81 mg by mouth daily. Influenza Vaccine Received Flu Vaccine for Current Season (usually Sept-March): Yes (august 2017)           Oxygen needs?  [] Room air Oxygen @  []1L    []2L    []3L   []4L    []5L   []6L     Use home O2? [] Yes [] No  Perform O2 challenge test using  smartphrase (.Homeoxygen)      Last bowel movement Last Bowel Movement Date: 11/12/17      Urinary Catheter             LDAs             Venous Access Device right chest [facility] 11/05/17 Upper chest (subclavicular area, right (Active)   Central Line Being Utilized Yes 11/13/2017  7:22 PM   Criteria for Appropriate Use Limited/no vessel suitable for conventional peripheral access 11/13/2017  3:08 PM   Site Assessment Clean, dry, & intact 11/13/2017  3:08 PM   Date of Last Dressing Change 11/12/17 11/13/2017  3:08 PM   Dressing Status Clean, dry, & intact 11/13/2017  3:08 PM   Dressing Type Disk with Chlorhexadine gluconate (CHG); Transparent 11/13/2017  3:08 PM   Action Taken Dressing changed 11/12/2017  6:28 AM   Date Accessed (Medial Site) 11/05/17 11/13/2017  3:08 PM   Access Time (Medial Site) 2255 11/5/2017 10:59 PM   Access Needle Size (Site #1) 20 G 11/10/2017  3:09 AM   Access Needle Length (Medial Site) 1 inch 11/10/2017  3:09 AM   Positive Blood Return (Medial Site) Yes 11/13/2017  3:08 PM   Action Taken (Medial Site) Flushed 11/13/2017  3:08 PM   Alcohol Cap Used Yes 11/13/2017  3:08 PM                           Readmission Risk Assessment Tool Score Low Risk            11       Total Score        3 Has Seen PCP in Last 6 Months (Yes=3, No=0)    2 . Living with Significant Other. Assisted Living. LTAC. SNF. or   Rehab    3 Patient Length of Stay (>5 days = 3)    3 Charlson Comorbidity Score (Age + Comorbid Conditions)        Criteria that do not apply:    IP Visits Last 12 Months (1-3=4, 4=9, >4=11)    Pt. Coverage (Medicare=5 , Medicaid, or Self-Pay=4)       Expected Length of Stay 3d 2h   Actual Length of Stay 8          Opportunity for questions and clarifications were given to the incoming nurse. Patient's bed is in low position, side rails x2, door open PRN, call bell within reach and patient not in distress.       Sol Ansari

## 2017-11-14 NOTE — PROGRESS NOTES
ADULT PROTOCOL: JET AEROSOL  REASSESSMENT    Patient  Elkin Rosario     63 y.o.   male     11/14/2017  8:23 AM    Breath Sounds Pre Procedure: Right Breath Sounds: Diminished                               Left Breath Sounds: Diminished    Breath Sounds Post Procedure: Right Breath Sounds: Crackles, Wheezing                                 Left Breath Sounds: Crackles, Wheezing    Breathing pattern: Pre procedure Breathing Pattern: Regular          Post procedure Breathing Pattern: Regular    Heart Rate: Pre procedure Pulse: 74           Post procedure Pulse: 88    Resp Rate: Pre procedure Respirations: 18           Post procedure Respirations: 20      Cough: Pre procedure Cough: Moist, Non-productive               Post procedure Cough: Moist, Non-productive      Oxygen: Room Air     Changed: NO    SpO2: Pre procedure SpO2: 97 %                 Post procedure SpO2: 98 %    Nebulizer Therapy: Current medications Aerosolized Medications: DuoNeb      Changed: No      Problem List:   Patient Active Problem List   Diagnosis Code    Hypertension I10    COPD (chronic obstructive pulmonary disease) ([facility]) J44.9    Asthma J45.909    Hyperlipidemia E78.5    Hyponatremia E87.1    Acute renal failure ([facility]) N17.9    Osteoarthritis of right hip M16.11    DJD (degenerative joint disease) of hip M16.9    Esophageal obstruction due to food impaction K22.2, X92.495U    B12 deficiency E53.8    S/P bronchoscopy with biopsy Z98.890    Chronic coronary artery disease I25.10    Gastroesophageal reflux disease with esophagitis K21.0    Cancer of lung ([facility]) C34.90    Lung cancer ([facility]) C34.90    Acute respiratory distress R06.03    COPD exacerbation ([facility] [address] J44.1       Respiratory Therapist: Yaw Frankel RT

## 2017-11-14 NOTE — PROGRESS NOTES
S: Mr. Monique Encinas was seen by me today during rounds. At this time, he is resting in bed. Asleep but rousable. He is still SOB with a lot of wheezing. Has had hearing loss. Generalized weakness. Able to walk only a few steps to chair with PT.  ROS otherwise unremarkable except as noted elsewhere. O: Blood pressure 124/74, pulse 73, temperature 97.8 °F (36.6 °C), resp. rate 18, height 6' (1.829 m), weight 170 lb (77.1 kg), SpO2 99 %. Gen: Patient is somewhat tachypnic. Lungs: Exp wheezing noted. Heart: RRR. Abd: S, NT, ND, BS present. Extremities: Warm. No results found for this or any previous visit (from the past 12 hour(s)). CT Head: No change. No acute abnormality identified    CTA chest:   1. Mildly enlarged left hilar lymph node. 2. Left lower lobe bullous emphysema associated with scarring. Lumbar Spine MRI:   No evidence of osseous metastatic disease. No canal or foraminal stenosis. Mild effacement of nerve roots extending to the left S1 foramen. Echo:  Left ventricle: Systolic function was normal. Ejection fraction was  estimated to be 65 %. There were no regional wall motion abnormalities. CT head: No acute abnormality. A / P:   1. Acute respiratory distress (11/5/2017): On oxygen as needed, other measures as below. Pulmonology has signed off. Still quite dyspneic. 2. COPD exacerbation ([address] (11/5/2017): Scheduled nebs, IV solumedrol. 3. Leg weakness: Neuro work up as above. Able to ambulate now, but limited by respiratory status. 4. Squamous Cell Lung Cancer, unresectable: Opdivo on hold for last few weeks on fear that it is exacerbating his COPD. His wife is concerned about the long time off chemo--Dr. Honore Holstein consulted; I spoke with her about the current situation. Sumner Borders would be \"useless\" while he is on steroids. 5. Elevated lactic acid. 6. Hearing loss: ?Due to chemo--not typical for opdivo.   Discussed with Dr. Honore Holstein and we'll ask Dr. Siobhan Valencia to see him before he leaves. 7. Hypertension: Controlled. 8. Falls/Deconditioning: PT / OT as breathing improves. 9. ETOH: CIWA protocol. 10. Mucositis  11. Code: Partial.   12. D/C planning: Inpatient Pulmonary Rehab denied by insurance (despite [id number]);  Will look into SNF

## 2017-11-14 NOTE — TELEPHONE ENCOUNTER
ONCOLOGY NURSE NAVIGATOR    TC to pt wife to make advise that Palliative Care could follow outpt, if she could provide transportation. Discussed that Palliative could provide support and sx management for SOB. Wife states it would be most difficult for her to provide transportation due to current inability to ambulate. \"Will wait and see how he does at rehab\"     Discussed future plan if pt is not progressing w/ therapy at rehab, states she is unsure. She works FT, she holds insurance for family. Pt receives $2040/mo disability, would not be eligible for Medicaid. Encouraged her to check out CG homes located in [address] [place] [address] as option. States she may have to hire private CG.     Update given to Loraine Sun RN

## 2017-11-14 NOTE — PROGRESS NOTES
Oncology Nursing Communication Tool  7:44 AM  11/14/2017     Bedside shift change report given to Tamia Up RN (incoming nurse) by Batool Carlson RN (outgoing nurse) on Callie Copmaty. Report included the following information SBAR, Kardex, Intake/Output, MAR, Accordion, Recent Results and Med Rec Status. Significant changes during shift: None      Issues for physician to address: None         Oncology Shift Note   Admission Date 11/5/2017   Admission Diagnosis COPD exacerbation ([facility] [address]  Acute respiratory distress   Code Status Partial Code   Consults IP CONSULT TO PULMONOLOGY  IP CONSULT TO HEMATOLOGY  IP CONSULT TO OTOLARYNGOLOGY  IP CONSULT TO PALLIATIVE CARE - PROVIDER      Cardiac Monitoring [x] Yes [] No      Purposeful Hourly Rounding [x] Yes    Robert Score Total Score: 4   Robert score 3 or > [] Bed Alarm [] Avasys [] 1:1 sitter [] Patient refused (Place signed refusal form in chart)      Pain Managed [x] Yes [] No    Key Pain Meds             aspirin (ASPIRIN) 325 mg tablet  (Taking) Take 81 mg by mouth daily. Influenza Vaccine Received Flu Vaccine for Current Season (usually Sept-March): Yes (august 2017)           Oxygen needs?  [x] Room air Oxygen @  []1L    []2L    []3L   []4L    []5L   []6L     Use home O2? [] Yes [x] No  Perform O2 challenge test using  smartphrase (.Homeoxygen)      Last bowel movement Last Bowel Movement Date: 11/12/17      Urinary Catheter             LDAs             Venous Access Device right chest [facility] 11/05/17 Upper chest (subclavicular area, right (Active)   Central Line Being Utilized Yes 11/14/2017  2:40 AM   Criteria for Appropriate Use Limited/no vessel suitable for conventional peripheral access 11/14/2017  2:40 AM   Site Assessment Clean, dry, & intact 11/14/2017  2:40 AM   Date of Last Dressing Change 11/12/17 11/14/2017  2:40 AM   Dressing Status Clean, dry, & intact 11/14/2017  2:40 AM   Dressing Type Disk with Chlorhexadine gluconate (CHG);Tape;Transparent 11/14/2017  2:40 AM   Action Taken Dressing changed 11/12/2017  6:28 AM   Date Accessed (Medial Site) 11/05/17 11/14/2017  2:40 AM   Access Time (Medial Site) [id number] 11/5/2017 10:59 PM   Access Needle Size (Site #1) 20 G 11/10/2017  3:09 AM   Access Needle Length (Medial Site) 1 inch 11/10/2017  3:09 AM   Positive Blood Return (Medial Site) Yes 11/14/2017  2:40 AM   Action Taken (Medial Site) Flushed 11/14/2017  2:40 AM   Alcohol Cap Used Yes 11/14/2017  2:40 AM                           Readmission Risk Assessment Tool Score Low Risk            11       Total Score        3 Has Seen PCP in Last 6 Months (Yes=3, No=0)    2 . Living with Significant Other. Assisted Living. LTAC. SNF. or   Rehab    3 Patient Length of Stay (>5 days = 3)    3 Charlson Comorbidity Score (Age + Comorbid Conditions)        Criteria that do not apply:    IP Visits Last 12 Months (1-3=4, 4=9, >4=11)    Pt. Coverage (Medicare=5 , Medicaid, or Self-Pay=4)       Expected Length of Stay 3d 2h   Actual Length of Stay 9          Opportunity for questions and clarifications were given to the incoming nurse. Patient's bed is in low position, side rails x2, door open PRN, call bell within reach and patient not in distress.       Manoj Farnsworth RN

## 2017-11-14 NOTE — PROGRESS NOTES
Deferred visit: Patient is able to open his eyes and responds \"not today\". . Too weak to participate.

## 2017-11-14 NOTE — DISCHARGE SUMMARY
Physician Discharge Summary     Patient ID:  Seth Banks  [id number]  38 y.o.  1955    Admit date: 11/5/2017    Discharge date and time: 11/16/2017    Admission Diagnoses: COPD exacerbation ([address] Acute respiratory distress    Discharge Diagnoses:  Principal Diagnosis                                               Active Problems:    Acute respiratory distress (11/5/2017)      COPD exacerbation (HCC) (11/5/2017)       Consults: Pulmonary/Intensive care, Hematology/Oncology and Palliative Care    Significant Diagnostic Studies:     CT Head: No change. No acute abnormality identified     CTA chest:   1. Mildly enlarged left hilar lymph node. 2. Left lower lobe bullous emphysema associated with scarring.     Lumbar Spine MRI:   No evidence of osseous metastatic disease. No canal or foraminal stenosis. Mild effacement of nerve roots extending to the left S1 foramen.     Echo:  Left ventricle: Systolic function was normal. Ejection fraction was  estimated to be 65 %. There were no regional wall motion abnormalities.     CT head: No acute abnormality. MRI: Significant increase in size of the ventricle system and periventrical white matter changes of unknown clinical significance. No other changes. Hospital Course: Mr. Seth Banks is a patient of mine who presented with the problems above. See the initial H and P for full details. CHIEF COMPLAINT: recurrent falls and SOB     HISTORY OF PRESENT ILLNESS:     Liliana Avalos is a 63 y.o.  male who presents with SOB worse over the last 3-4 weeks. Patient also noted increased falls since this time.     Patient reports SOB with increased wheezing but denies any significant cough or sputum production. He denies any fever, dysphagia or myalgias. He states that he has been told that he only has 25% of his lung capacity. He denies any CP or swelling. He presented to the ED in respiratory distress.  He has Squamous Cell cancer of the Lung and has been on Opdivo with good result but this has been held recently for concern of pulmonary toxicity. CTA chest in the ED was negative for PE.     Patient also has increased falls despite using his walker and notes that his legs just \"give out. \" He denies any bowel or bladder symptoms. He reports aching pain in RLE and reports having been told by ortho that he has arthritis in his back. He denies any back pain. He was orthostatic recently until he went to his PCP and medication changes were made that improved this. Orthostatic Vital signs were unremarkable in the ED. By problem. ..    1. Acute respiratory distress (11/5/2017): On oxygen as needed, other measures as below. Pulmonology has been following his case. Despite therapy, he remains quite dyspneic. 2. Encephalopathy: He has improved some since two days ago, but still remains quite subdued compared to baseline. CT head and MRI are nonrevealing; ABG okay. I discussed with his wife, and I think there isn't much more to be done in hospital right now; his best way forward is to get some therapy. 3. COPD exacerbation ([facility] (11/5/2017): Scheduled nebs, IV solumedrol. We'll change to PO pred taper. 4. Leg weakness: Neuro work up as above. Able to ambulate now, but limited by respiratory status. 5. Squamous Cell Lung Cancer, unresectable: Opdivo on hold for last few weeks on fear that it is exacerbating his COPD. His wife is concerned about the long time off chemo--Dr. Macky Gaucher consulted; I spoke with her about the current situation. Nura Salvador would be \"useless\" while he is on steroids. 6. Elevated lactic acid. 7. Hearing loss: ?Due to chemo--However, not a typical adverse effect for opdivo. Discussed with Dr. Macky Gaucher. Had been in outpatient work up with Dr. Cody Dennis. 8. Hypertension: Controlled. 9. Falls/Deconditioning: PT / OT as able to tolerate. 10. ETOH: CIWA protocol. No DT's or signs of withdrawal here. 11. Mucositis  12.  Code: Partial.  No heroic resuscitative measures \"if he has \", but he would accept \"temporary intubation\". 13. D/C planning: Inpatient Pulmonary Rehab denied by insurance (despite [id number]);  SNF    Discharge Exam:  Blood pressure 118/66, pulse 100, temperature 97.7 °F (36.5 °C), resp. rate 16, height 6' (1.829 m), weight 170 lb (77.1 kg), SpO2 100 %. Gen: Patient is in no acute distress. Lungs: CTAB. Heart: RRR. Abd: S, NT, ND, BS present. Extremities: Warm. Disposition: [facility]    Patient Instructions:   Current Discharge Medication List      START taking these medications    Details   therapeutic multivitamin (THERAGRAN) tablet Take 1 Tab by mouth daily. Qty: 1 Tab, Refills: 0      thiamine (B-1) 100 mg tablet Take 1 Tab by mouth daily. Qty: 1 Tab, Refills: 0      nicotine (NICODERM CQ) 7 mg/24 hr 1 Patch by TransDERmal route daily for 30 days. Qty: 30 Patch, Refills: 0      guaiFENesin ER (MUCINEX) 600 mg ER tablet Take 1 Tab by mouth every twelve (12) hours. Qty: 1 Tab, Refills: 0      folic acid (FOLVITE) 1 mg tablet Take 1 Tab by mouth daily. Qty: 1 Tab, Refills: 0      fluticasone-vilanterol (BREO ELLIPTA) 100-25 mcg/dose inhaler Take 1 Puff by inhalation daily. Qty: 1 Inhaler, Refills: 0      aspirin 81 mg chewable tablet Take 1 Tab by mouth daily. Qty: 1 Tab, Refills: 0      aluminum & magnesium hydroxide-simethicone (MYLANTA II) 400-400-40 mg/5 mL suspension Take 30 mL by mouth every four (4) hours as needed for Indigestion. Qty: 1 mL, Refills: 0      lidocaine (LIDODERM) 5 % Apply patch to the affected area for 12 hours a day and remove for 12 hours a day. Qty: 1 Each, Refills: 0      albuterol-ipratropium (DUO-NEB) 2.5 mg-0.5 mg/3 ml nebu 3 mL by Nebulization route four (4) times daily.   Qty: 30 Nebule, Refills: 0      predniSONE (STERAPRED DS) 10 mg dose pack See administration instruction per 10mg dose pack  Qty: 21 Tab, Refills: 0         CONTINUE these medications which have NOT CHANGED Details   SPIRIVA WITH HANDIHALER 18 mcg inhalation capsule INHALE CONTENTS OF 1 CAPSULE THROUGH HANDIHALER DEVICE DAILY  Qty: 90 Cap, Refills: 2      albuterol (PROVENTIL VENTOLIN) 2.5 mg /3 mL (0.083 %) nebulizer solution USE VIA NEBULIZER EVERY FOUR HOURS AS NEEDED FOR WHEEZING AS DIRECTED  Qty: 90 Package, Refills: 0      ERGOCALCIFEROL, VITAMIN D2, (VITAMIN D2 PO) Take 1.25 mg by mouth. Once per week      predniSONE (DELTASONE) 5 mg tablet Take  by mouth.      lisinopril (PRINIVIL, ZESTRIL) 10 mg tablet Take 1 Tab by mouth daily. Qty: 90 Tab, Refills: 3      beclomethasone (QVAR) 80 mcg/actuation aero Take 2 Puffs by inhalation two (2) times a day. Qty: 3 Inhaler, Refills: 3      albuterol (PROAIR HFA) 90 mcg/actuation inhaler Take 2 Puffs by inhalation every four (4) hours as needed for Wheezing. Qty: 3 Inhaler, Refills: 3      gabapentin (NEURONTIN) 100 mg capsule Take 2 Caps by mouth two (2) times a day. Indications: nerve damage  Qty: 60 Cap, Refills: 2      pantoprazole (PROTONIX) 40 mg tablet          STOP taking these medications       NIVOLUMAB (OPDIVO IV) Comments:   Reason for Stopping:         aspirin (ASPIRIN) 325 mg tablet Comments:   Reason for Stopping:         OTHER Comments:   Reason for Stopping:             Recommended Diet: Regular Diet    Recommended Activity: PT/OT Eval and Treat    Follow-up With  Details  Why  Contact Info   Stark Schlatter    after discharge from facility  [address]     Lam Felling    as directed by her  [address]  [address]  [facility]  [phone]         Signed:  Jamal De Leon MD  11/14/2017  1:47 PM    Note: Greater than 30 minutes were spent on activities related to this discharge.

## 2017-11-14 NOTE — PROGRESS NOTES
Dr. Jazmine Tena paged to review discharge plan and request that he contact wife for update on patient. Wife - Jillian Yousif - [phone]    CM will plan on discharge to [facility] if appropriate.     Raquel Kapadia RN, BSN, ACM   - Medical Oncology  [phone]

## 2017-11-14 NOTE — PROGRESS NOTES
Dr. Violet Yousif returned page to [facility] and planned to discharge patient today to [facility]. Dr. Siva Vargas in to see patient and is requesting new MRI due to his drowsiness today. She has spoken to Dr. Violet Yousif and discharge will be delayed until testing complete. Patient's wife notified by . She states she has spoken to Dr. Violet Yousif and Dr. Siva Vargas to call her as well. [facility] notified and will accept patient when he is ready.     Christel Delcid, RN, BSN, ACM   - Medical Oncology  [phone]

## 2017-11-15 NOTE — PROGRESS NOTES
S: Mr. Leda Shane was seen by me today during rounds. At this time, he is asleep but rousable. Wheezing is a bit less pronounced today. Has had hearing loss. Generalized weakness. Able to walk only a few steps to chair with PT.  ROS otherwise unremarkable except as noted elsewhere. O: Blood pressure 136/88, pulse 88, temperature 97.3 °F (36.3 °C), resp. rate 18, height 6' (1.829 m), weight 170 lb (77.1 kg), SpO2 96 %. Gen: Patient is somewhat tachypnic. Lungs: Exp wheezing noted. Heart: RRR. Abd: S, NT, ND, BS present. Extremities: Warm. No results found for this or any previous visit (from the past 12 hour(s)). CT Head: No change. No acute abnormality identified    CTA chest:   1. Mildly enlarged left hilar lymph node. 2. Left lower lobe bullous emphysema associated with scarring. Lumbar Spine MRI:   No evidence of osseous metastatic disease. No canal or foraminal stenosis. Mild effacement of nerve roots extending to the left S1 foramen. Echo:  Left ventricle: Systolic function was normal. Ejection fraction was  estimated to be 65 %. There were no regional wall motion abnormalities. CT head: No acute abnormality. MRI: pending. A / P:   1. Acute respiratory distress (11/5/2017): On oxygen as needed, other measures as below. Pulmonology has signed off. Still quite dyspneic. 2. COPD exacerbation ([address] (11/5/2017): Scheduled nebs, IV solumedrol. 3. Leg weakness: Neuro work up as above. Able to ambulate now, but limited by respiratory status. Discussed with Dr. Dov Romero, and it might be beneficial to get MRI brain. 4. Squamous Cell Lung Cancer, unresectable: Opdivo on hold for last few weeks on fear that it is exacerbating his COPD. His wife is concerned about the long time off chemo--Dr. Yessenia Mcduffie consulted; I spoke with her about the current situation. Omar So would be \"useless\" while he is on steroids. 5. Elevated lactic acid.    6. Hearing loss: ?Due to chemo--not typical for opdivo. Discussed with Dr. Jocelyne Clements and we'll ask Dr. Vandana Velasquez to see him before he leaves. 7. Hypertension: Controlled. 8. Falls/Deconditioning: PT / OT as breathing improves. 9. ETOH: CIWA protocol. 10. Mucositis  11. Code: Partial.   12. D/C planning: Inpatient Pulmonary Rehab denied by insurance (despite [id number]);  Will look into SNF

## 2017-11-15 NOTE — PROGRESS NOTES
Pt seemed to be resting with his lunch in front of him. Left Care Card and business card in room. Will continue to follow up as able/needed. For Spiritual Care paging please call [phone]). Elizabeth Comer M.Div.   Palliative  Fellow

## 2017-11-15 NOTE — CONSULTS
Palliative Medicine Consult  [place]: [phone] ([phone])    Patient Name: Yoko Wright  YOB: 1955    Date of Initial Consult: 11/13/17  Reason for Consult: overwhelming symptoms  Requesting Provider: Javier Weston   Primary Care Physician: Murphy العراقي MD     SUMMARY:   Yoko Wright is a 63 y.o. gentleman with a past history of COPD, NSCLC undergoing treatment with biologic agent and sp VATS resection, arthritis with LBP and radiculopathy, recent weakness and frequent falls, who was admitted on 11/5/2017 from home with a diagnosis of AE COPD, weakness, falls. Current medical issues leading to Palliative Medicine involvement include: acute and chronic progressive medical illness, overwhelming symptoms. PALLIATIVE DIAGNOSES:   1. Shortness of breath  2. Low back pain, radiculopathy  3. Debility  4. Nausea - intermittent  5. AE COPD  6. NSCLC     PLAN:   1. Visited w/ pt. No family at bedside. 2. Continue Tylenol, lidocaine patch, continue gabapentin  Still recommend consideration for opioid prn regimen for SOB  3. Continue maximal medical tx for COPD, nebs, steroids  4. Continue efforts at strengthening, even minimal improvement may help with pain / balance  5. Code status is LIMITED RESUSCITATION per active EMR order  6. Regarding goals of care, continued efforts at rehab / strengthening, work on symptoms, try to get back to more functional / steady state, resume cancer treatment if possible. Yes to short term intubation  7. Initial consult note routed to primary continuity provider  8.  Communicated plan of care with: Palliative IDT, bedside RN Mendel Lindsay)     GOALS OF CARE / TREATMENT PREFERENCES:   [====Goals of Care====]  GOALS OF CARE:  Patient / health care proxy stated goals: continued efforts at rehab / strengthening, work on symptoms, try to get back to more functional / steady state, resume cancer treatment if possible  ok with short term intubation    TREATMENT PREFERENCES:   Code Status: Partial Code   Code status is LIMITED RESUSCITATION    Advance Care Planning:  Advance Care Planning 11/13/2017   Patient's Healthcare Decision Maker is: Legal Next of Josr Last   Primary Decision Maker Name Griselda Swenson   Primary Decision Maker Phone Number [phone]   Primary Decision Maker Relationship to Patient Spouse   Confirm Advance Directive None   Patient Would Like to Complete Advance Directive -       Other:    The palliative care team has discussed with patient / health care proxy about goals of care / treatment preferences for patient.  [====Goals of Care====]     HISTORY:     History obtained from: EMR, bedside RN    CHIEF COMPLAINT: pt is asleep    HPI/SUBJECTIVE:    The patient is: asleep  [] Verbal and participatory  [] Non-participatory due to:   No o/n events or issues. Per bedside RN, no staff concerns at this time. 63 yom with above history and current issues. On assessment, he is verbal but hx is severely limited by [facility]. Pt actually denies any pain at this time. Per wife, he's had hx of low back pain radiating to legs, which is thought to be arthritis. We reviewed various tx he's tried including tylenol (some benefit). Moderate dyspnea and fatigue. Intermittent nausea.      Clinical Pain Assessment (nonverbal scale for severity on nonverbal patients):   [++++ Clinical Pain Assessment++++]  [++++Pain Severity++++]: Pain: 0 (below is best description I can get for when pt has pain)  [++++Pain Character++++]: ache  [++++Pain Duration++++]: months  [++++Pain Effect++++]: hard to get oob  [++++Pain Factors++++]: better somewhat with tylenol  [++++Pain Frequency++++]: daily, but apparently intermittent  [++++Pain Location++++]: low back, down leg occasionally  [++++ Clinical Pain Assessment++++]  Duration: for how long has pt been experiencing pain (e.g., 2 days, 1 month, years)  Frequency: how often pain is an issue (e.g., several times per day, once every few days, constant) FUNCTIONAL ASSESSMENT:     Palliative Performance Scale (PPS):  PPS: 40       PSYCHOSOCIAL/SPIRITUAL SCREENING:     Advance Care Planning:  Advance Care Planning 11/13/2017   Patient's Healthcare Decision Maker is: Legal Next of Josr Last   Primary Decision Maker Name Lissy Bradley   Primary Decision Maker Phone Number [phone]   Primary Decision Maker Relationship to Patient Spouse   Confirm Advance Directive None   Patient Would Like to Complete Advance Directive -        Any spiritual / Yarsanism concerns:  [] Yes /  [x] No    Caregiver Burnout:  [] Yes /  [] No /  [x] No Caregiver Present       Anticipatory grief assessment:   [] Normal  / [] Maladaptive       ESAS Anxiety: Anxiety: 0    ESAS Depression: Depression: 0        REVIEW OF SYSTEMS:     Positive and pertinent negative findings in ROS are noted above in HPI. The following systems were [x] reviewed / [] unable to be reviewed as noted in HPI  Other findings are noted below. Systems: constitutional, ears/nose/mouth/throat, respiratory, gastrointestinal, genitourinary, musculoskeletal, integumentary, neurologic, psychiatric, endocrine. Positive findings noted below. Modified ESAS Completed by: provider   Fatigue: 5 Drowsiness: 0   Depression: 0 Pain: 0   Anxiety: 0 Nausea: 2   Anorexia: 0 Dyspnea: 5     Constipation: No     Stool Occurrence(s): 0        PHYSICAL EXAM:     From RN flowsheet:  Wt Readings from Last 3 Encounters:   11/15/17 164 lb 11.2 oz (74.7 kg)   11/15/17 164 lb 11.2 oz (74.7 kg)   10/09/17 157 lb (71.2 kg)     Blood pressure 146/79, pulse 81, temperature 98.4 °F (36.9 °C), resp. rate 16, height 6' (1.829 m), weight 164 lb 11.2 oz (74.7 kg), SpO2 98 %.     Pain Scale 1: Numeric (0 - 10)  Pain Intensity 1: 0     Pain Location 1: Leg  Pain Orientation 1: Right  Pain Description 1: Aching  Pain Intervention(s) 1: Medication (see MAR)    Constitutional: appears comfortable, lying in bed w/ HOB elevated, in NAD  Respiratory: breathing unlabored, symmetric  Neurologic: asleep  Psychiatric: limited exam at this time  No obvious signs of agitation     HISTORY:     Active Problems:    Acute respiratory distress (11/5/2017)      COPD exacerbation ([facility] (11/5/2017)      Past Medical History:   Diagnosis Date    Arthritis     Asthma 5/21/2010    B12 deficiency 2/19/2016    Cancer ([facility]     Lung    COPD (chronic obstructive pulmonary disease) ([facility] 5/21/2010    asthma, uses inhalers    Hyperlipidemia 5/21/2010    Hypertension 5/21/2010    Other ill-defined conditions(799.89) 10/31/13    CURRENTLY BEING TRX FOR COLD    Other ill-defined conditions(799.89)     AVASCULAR NECROSIS OF HIPS/ bilateral hip replacement       Past Surgical History:   Procedure Laterality Date    HX GI      COLONOSCOPY    HX HEENT      WISDOM TEETH    HX HIP REPLACEMENT Right 2014    Dr. Raghav Mckenzie  11/2014    L hip    HX LUMBAR LAMINECTOMY  1990s    HX ORTHOPAEDIC  25 years ago    lumbar laminectomy    HX ORTHOPAEDIC      left hip replacement    HX OTHER SURGICAL      egd    HX OTHER SURGICAL      bronchoscopy with biopsy    TOTAL HIP ARTHROPLASTY Right 11/2013      Family History   Problem Relation Age of Onset    Diabetes Father      father    Lung Disease Mother      COPD    Hypertension Mother     Obesity Sister     Anesth Problems Neg Hx       History reviewed, no pertinent family history.   Social History   Substance Use Topics    Smoking status: Current Every Day Smoker     Packs/day: 0.25     Years: 30.00     Types: Cigarettes    Smokeless tobacco: Never Used    Alcohol use 12.0 oz/week     24 Cans of beer per week      Comment: per week     Allergies   Allergen Reactions    Amoxicillin Other (comments)     Headache    Hydrochlorothiazide Other (comments)     Hyponatremia      Current Facility-Administered Medications   Medication Dose Route Frequency    albuterol-ipratropium (DUO-NEB) 2.5 MG-0.5 MG/3 ML  3 mL Nebulization Q6HWA RT    acetaminophen (TYLENOL) tablet 650 mg  650 mg Oral Q8H    lidocaine (LIDODERM) 5 % patch 1 Patch  1 Patch TransDERmal Q24H    fluticasone-vilanterol (BREO ELLIPTA) 100mcg-25mcg/puff  1 Puff Inhalation DAILY    aluminum & magnesium hydroxide-simethicone (MYLANTA II) oral suspension 30 mL  30 mL Oral Q4H PRN    guaiFENesin ER (MUCINEX) tablet 600 mg  600 mg Oral Q12H    methylPREDNISolone (PF) (SOLU-MEDROL) injection 40 mg  40 mg IntraVENous Q6H    gabapentin (NEURONTIN) capsule 200 mg  200 mg Oral BID    lisinopril (PRINIVIL, ZESTRIL) tablet 10 mg  10 mg Oral DAILY    umeclidinium (INCRUSE ELLIPTA) 62.5 mcg/actuation  1 Puff Inhalation DAILY    albuterol (PROVENTIL VENTOLIN) nebulizer solution 2.5 mg  2.5 mg Nebulization Q4H PRN    nicotine (NICODERM CQ) 7 mg/24 hr patch 1 Patch  1 Patch TransDERmal DAILY    sodium chloride (NS) flush 5-10 mL  5-10 mL IntraVENous Q8H    sodium chloride (NS) flush 5-10 mL  5-10 mL IntraVENous PRN    ondansetron (ZOFRAN) injection 4 mg  4 mg IntraVENous Q4H PRN    enoxaparin (LOVENOX) injection 40 mg  40 mg SubCUTAneous Q24H    aspirin chewable tablet 81 mg  81 mg Oral DAILY    klack's mouthwash oral suspension (COMPOUNDED)  5 mL Oral QID    diazePAM (VALIUM) tablet 5 mg  5 mg Oral Q1H PRN    diazePAM (VALIUM) tablet 10 mg  10 mg Oral D2V PRN    folic acid (FOLVITE) tablet 1 mg  1 mg Oral DAILY    thiamine (B-1) tablet 100 mg  100 mg Oral DAILY    therapeutic multivitamin (THERAGRAN) tablet 1 Tab  1 Tab Oral DAILY          LAB AND IMAGING FINDINGS:     Lab Results   Component Value Date/Time    WBC 10.0 11/13/2017 04:26 AM    HGB 13.0 11/13/2017 04:26 AM    PLATELET 074 02/16/5929 04:26 AM     Lab Results   Component Value Date/Time    Sodium 136 11/13/2017 04:26 AM    Potassium 4.0 11/13/2017 04:26 AM    Chloride 102 11/13/2017 04:26 AM    CO2 26 11/13/2017 04:26 AM    BUN 51 11/13/2017 04:26 AM    Creatinine 1.18 11/13/2017 04:26 AM    Calcium 8.0 11/13/2017 04:26 AM    Magnesium 1.7 11/06/2017 05:27 AM    Phosphorus 2.8 11/06/2017 05:27 AM      Lab Results   Component Value Date/Time    AST (SGOT) 33 11/13/2017 04:26 AM    Alk. phosphatase 47 11/13/2017 04:26 AM    Protein, total 6.0 11/13/2017 04:26 AM    Albumin 2.9 11/13/2017 04:26 AM    Globulin 3.1 11/13/2017 04:26 AM     Lab Results   Component Value Date/Time    INR 1.1 12/20/2014 10:15 PM    Prothrombin time 11.4 12/20/2014 10:15 PM      Lab Results   Component Value Date/Time    Ferritin 1413 06/30/2016 04:00 AM      Lab Results   Component Value Date/Time    pH 7.42 11/13/2017 05:30 PM    PCO2 31 11/13/2017 05:30 PM    PO2 93 11/13/2017 05:30 PM     No components found for: [facility]   Lab Results   Component Value Date/Time    CK 75 11/05/2017 03:09 PM    CK - MB 2.4 11/05/2017 03:09 PM                Total time: 25 min  Counseling / coordination time, spent as noted above: 15 min  > 50% counseling / coordination?: yes    Prolonged service was provided for  []30 min   []75 min in face to face time in the presence of the patient, spent as noted above. Time Start:   Time End:   Note: this can only be billed with 52137 (initial) or 51564 (follow up). If multiple start / stop times, list each separately.

## 2017-11-15 NOTE — PROGRESS NOTES
ADULT PROTOCOL: JET AEROSOL  REASSESSMENT    Patient  Frances Gerard     63 y.o.   male     11/15/2017  12:45 AM    Breath Sounds Pre Procedure: Right Breath Sounds: Diminished                               Left Breath Sounds: Diminished    Breath Sounds Post Procedure: Right Breath Sounds: Diminished                                 Left Breath Sounds: Diminished    Breathing pattern: Pre procedure Breathing Pattern: Regular          Post procedure Breathing Pattern: Regular    Heart Rate: Pre procedure Pulse: 101           Post procedure Pulse: 98    Resp Rate: Pre procedure Respirations: 20           Post procedure Respirations: 20    Peak Flow: Pre bronchodilator   n/a          Post bronchodilator   n/a  n/a  FVC/FEV1:n/a    Incentive Spirometry:    n/a        Cough: Pre procedure Cough: Non-productive               Post procedure Cough: Non-productive    Suctioned: NO    Sputum: Pre procedure  n/a                 Post procedure  n/a    Oxygen: O2 Device: Room air   FiO2 (%) 21% room air     Changed: NO    SpO2: Pre procedure SpO2: 97 %   without oxygen              Post procedure SpO2: 97 %  Without oxygen   oxygen    Nebulizer Therapy: Current medications Aerosolized Medications: DuoNeb      Changed: NO    Problem List:   Patient Active Problem List   Diagnosis Code    Hypertension I10    COPD (chronic obstructive pulmonary disease) ([facility]) J44.9    Asthma J45.909    Hyperlipidemia E78.5    Hyponatremia E87.1    Acute renal failure ([facility]) N17.9    Osteoarthritis of right hip M16.11    DJD (degenerative joint disease) of hip M16.9    Esophageal obstruction due to food impaction K22.2, R47.006J    B12 deficiency E53.8    S/P bronchoscopy with biopsy Z98.890    Chronic coronary artery disease I25.10    Gastroesophageal reflux disease with esophagitis K21.0    Cancer of lung ([facility]) C34.90    Lung cancer ([facility]) C34.90    Acute respiratory distress R06.03    COPD exacerbation ([facility]) J44.1       Respiratory Therapist: Anant Mckeon, RT

## 2017-11-15 NOTE — PROGRESS NOTES
Oncology Interdisciplinary rounds were held today to discuss patient plan of care and outcomes. The following members were present: Nursing and Case Management. Plan of Care: MRI, Palliative? Discharge Disposition: SNF?

## 2017-11-15 NOTE — PROGRESS NOTES
ADULT PROTOCOL: JET AEROSOL ASSESSMENT    Patient  Chloe Hays     63 y.o.   male     11/15/2017  3:35 PM    Breath Sounds Pre Procedure: Right Breath Sounds: Diminished                               Left Breath Sounds: Diminished    Breath Sounds Post Procedure: Right Breath Sounds: Diminished                                 Left Breath Sounds: Diminished    Breathing pattern: Pre procedure Breathing Pattern: Regular          Post procedure Breathing Pattern: Regular    Heart Rate: Pre procedure Pulse: 88           Post procedure Pulse: 88    Resp Rate: Pre procedure Respirations: 20           Post procedure Respirations: 20      I        Cough: Pre procedure Cough: Non-productive               Post procedure Cough: Non-productive    Oxygen: O2 Device: Room air       Changed: NO    SpO2: Pre procedure SpO2: 96 %   without oxygen              Post procedure SpO2: 97 %  without oxygen    Nebulizer Therapy: Current medications Aerosolized Medications: DuoNeb      Changed: no        Problem List:   Patient Active Problem List   Diagnosis Code    Hypertension I10    COPD [facility]chronic obstructive pulmonary disease) ([facility] J44.9    Asthma J45.909    Hyperlipidemia E78.5    Hyponatremia E87.1    Acute renal failure ([facility]) N17.9    Osteoarthritis of right hip M16.11    DJD (degenerative joint disease) of hip M16.9    Esophageal obstruction due to food impaction K22.2, Q87.653Q    B12 deficiency E53.8    S/P bronchoscopy with biopsy Z98.890    Chronic coronary artery disease I25.10    Gastroesophageal reflux disease with esophagitis K21.0    Cancer of lung ([facility]) C34.90    Lung cancer ([facility]) C34.90    Acute respiratory distress R06.03    COPD exacerbation ([facility] [address] J44.1       Respiratory Therapist: Cristian Farrar RT

## 2017-11-15 NOTE — PROGRESS NOTES
Problem: Mobility Impaired (Adult and Pediatric)  Goal: *Acute Goals and Plan of Care (Insert Text)  Physical Therapy Goals  Initiated 11/7/2017  1. Patient will move from supine to sit and sit to supine , scoot up and down and roll side to side in bed with independence within 7 day(s). 2.  Patient will transfer from bed to chair and chair to bed with independence using the least restrictive device within 7 day(s). 3.  Patient will perform sit to stand with independence within 7 day(s). 4.  Patient will ambulate with modified independence for 100 feet with the least restrictive device within 7 day(s). physical Therapy TREATMENT  Seen 0940 to [phone]      Patient: Martha Jimenez (32 y.o. male)  Date: 11/15/2017  Diagnosis: COPD exacerbation ([facility] [address]  Acute respiratory distress <principal problem not specified>  Precautions: Fall    ASSESSMENT:  Patient seen for mobility skills, transfers and attempt at gait. Very slow to respond verbally. Had a somewhat glazed look on his face. He is [place]. Agreeable to working with therapy, but slow with all movements. Mod assist x 2 to come to sitting. Balance off in sitting needing at least CGA entire time. Attempted to stand x 3 to the RW. Needing max assist x 2. Not able to get knees and hips straight on any of the trials. Not able to take any steps. Not safe to attempt transfer to the chair. Assisted back to supine with assist x 2. Needs SNF. Progression toward goals:  []    Improving appropriately and progressing toward goals  [x]    Improving slowly and progressing inconsistently toward goals  []    Not making progress toward goals and plan of care will be adjusted     PLAN:  Patient continues to benefit from skilled intervention to address the above impairments. Continue treatment per established plan of care.   Discharge Recommendations:  [facility]  Further Equipment Recommendations for Discharge:  Defer to SNF     SUBJECTIVE:   Patient stated I'll try.     OBJECTIVE DATA SUMMARY:   Critical Behavior:  Neurologic State: Alert, Drowsy  Orientation Level: Oriented to place, Disoriented to situation, Disoriented to place, Disoriented to time  Cognition: Decreased attention/concentration, Decreased command following  Safety/Judgement: Awareness of environment, Fall prevention, Home safety     Functional Mobility Training:  Bed Mobility:  Rolling: Minimum assistance  Supine to Sit: Moderate assistance;Assist x2  Sit to Supine: Moderate assistance;Assist x2  Scooting: Moderate assistance  Level of Assistance: Moderate assistance     Transfers:  Sit to Stand: Maximum assistance;Assist x2 (did 3 attempts, not able to come to a full stand on any tria)  Stand to Sit: Maximum assistance;Assist x2   Stand Pivot Transfers:  (not able, not safe to attempt)    Balance:  Sitting: Impaired; Without support  Sitting - Static: Fair (occasional)  Sitting - Dynamic: Poor (constant support)  Standing: Impaired  Standing - Static: Constant support;Poor  Standing - Dynamic : Poor    Ambulation/Gait Training:  Distance (ft):  (not able to take any steps today)  Assistive Device: Gait belt;Walker, rolling     Pain:  Pain Scale 1: Numeric (0 - 10)  Pain Intensity 1: 0     Activity Tolerance: Tolerated PT session fairly well. Still not totally alert and mentally engaged. Please refer to the flowsheet for vital signs taken during this treatment.   After treatment:   []    Patient left in no apparent distress sitting up in chair  [x]    Patient left in no apparent distress in bed  [x]    Call bell left within reach  [x]    Nursing notified  []    Caregiver present  []    Bed alarm activated (not being used)    COMMUNICATION/COLLABORATION:   The patients plan of care was discussed with: Occupational Therapist and Registered Nurse    Justin Foster, PT  Time Calculation: 17 mins

## 2017-11-15 NOTE — PROGRESS NOTES
Initial Nutrition Assessment:    INTERVENTIONS/RECOMMENDATIONS:   · Meals/Snacks: General/healthful diet:  Encourage intake at meals. · Supplements: Commercial supplement:  RD ordered Ensure Enlive po BID with meals. ASSESSMENT:   11/15:  Chart reviewed; med noted for acute resp failure. Hx of HTN, COPD, squamous cell lung cancer. Pt sleeping soundly with lunch tray untouched. If po intake remains poor and/or pt c/o limited food selections, will consider liberalize diet. Diet Order: Cardiac  % Eaten:  No data found. Pertinent Medications: [x]Reviewed []Other: MVI, thiamine  Pertinent Labs: [x]Reviewed []Other  Food Allergies: [x]None []Other   Last BM: 11/12   [x]Active     []Hyperactive  []Hypoactive       [] Absent BS  Skin:    [x] Intact   [] Incision  [] Breakdown  [] Other:    Anthropometrics:   Height: 6' (182.9 cm) Weight: 74.7 kg (164 lb 11.2 oz)   IBW (%IBW):   ( ) UBW (%UBW):   (  %)   Last Weight Metrics:  Weight Loss Metrics 11/15/2017 10/9/2017 8/4/2017 7/29/2017 5/8/2017 7/26/2016 7/1/2016   Today's Wt 164 lb 11.2 oz 157 lb 157 lb 9.6 oz 160 lb 15 oz 170 lb 3.2 oz 161 lb 158 lb 8 oz   BMI 22.34 kg/m2 21.29 kg/m2 21.37 kg/m2 21.83 kg/m2 23.08 kg/m2 21.83 kg/m2 21.49 kg/m2       BMI: Body mass index is 22.34 kg/(m^2). This BMI is indicative of:   []Underweight    [x]Normal    []Overweight    [] Obesity   [] Extreme Obesity (BMI>40)     Estimated Nutrition Needs (Based on):   2065 Kcals/day (BMR (1589) x 1. 3AF) , 75 g (1.0 g/kg bw) Protein  Carbohydrate:  At Least 130 g/day  Fluids: 2000 mL/day (1ml/kcal)    NUTRITION DIAGNOSES:   Problem:  Inadequate energy intake      Etiology: related to decreased appetite     Signs/Symptoms: as evidenced by weight loss, PO intake <50% of meals      NUTRITION INTERVENTIONS:  Meals/Snacks: General/healthful diet   Supplements: Commercial supplement              GOAL:   PO intake at least 50% of meals next 3-5 days    LEARNING NEEDS (Diet, Food/Nutrient-Drug Interaction):    [x] None Identified   [] Identified and Education Provided/Documented   [] Identified and Pt declined/was not appropriate     Cultureal, Sikh, OR Ethnic Dietary Needs:    [x] None Identified   [] Identified and Addressed     [x] Interdisciplinary Care Plan Reviewed/Documented    [x] Discharge Planning: Continue po as tolerated (liberalize diet)      MONITORING /EVALUATION:      Food/Nutrient Intake Outcomes:  Total energy intake  Physical Signs/Symptoms Outcomes: Weight/weight change    NUTRITION RISK:    [] High              [x] Moderate           []  Low  []  Minimal/Uncompromised    PT SEEN FOR:    []  MD Consult: []Calorie Count      []Diabetic Diet Education        []Diet Education     []Electrolyte Management     []General Nutrition Management and Supplements     []Management of Tube Feeding     []TPN Recommendations    []  RN Referral:  []MST score >=2     []Enteral/Parenteral Nutrition PTA     []Pregnant: Gestational DM or Multigestation     []Pressure Ulcer/Wound Care needs        []  Low BMI  [x]  LASHA Perez Twyla, [address] [place] [address]  Pager [phone]  Weekend Pager [phone]

## 2017-11-15 NOTE — PROGRESS NOTES
Oncology Nursing Communication Tool  8:53 PM  11/14/2017     Bedside and Verbal shift change report given to Rivka Cardenas RN (incoming nurse) by Delmar Zambrano (outgoing nurse) on Callie Copmaty. Report included the following information SBAR, Kardex, Procedure Summary, Intake/Output, MAR, Accordion and Recent Results. Significant changes during shift: none to note      Issues for physician to address: same         Oncology Shift Note   Admission Date 11/5/2017   Admission Diagnosis COPD exacerbation ([facility] [address]  Acute respiratory distress   Code Status Partial Code   Consults IP CONSULT TO PULMONOLOGY  IP CONSULT TO HEMATOLOGY  IP CONSULT TO OTOLARYNGOLOGY  IP CONSULT TO PALLIATIVE CARE - PROVIDER      Cardiac Monitoring [x] Yes [] No      Purposeful Hourly Rounding [x] Yes    Robert Score Total Score: 5   Robert score 3 or > [x] Bed Alarm [] Avasys [] 1:1 sitter [] Patient refused (Place signed refusal form in chart)      Pain Managed [x] Yes [] No    Key Pain Meds             aspirin (ASPIRIN) 325 mg tablet  (Taking) Take 81 mg by mouth daily. Influenza Vaccine Received Flu Vaccine for Current Season (usually Sept-March): Yes (august 2017)           Oxygen needs?  [x] Room air Oxygen @  []1L    []2L    []3L   []4L    []5L   []6L     Use home O2? [] Yes [] No  Perform O2 challenge test using  smartphrase (.Homeoxygen)      Last bowel movement Last Bowel Movement Date: 11/12/17      Urinary Catheter             LDAs             Venous Access Device right chest [facility] 11/05/17 Upper chest (subclavicular area, right (Active)   Central Line Being Utilized Yes 11/14/2017  7:16 PM   Criteria for Appropriate Use Limited/no vessel suitable for conventional peripheral access 11/14/2017  3:13 PM   Site Assessment Clean, dry, & intact 11/14/2017  3:13 PM   Date of Last Dressing Change 11/12/17 11/14/2017  3:13 PM   Dressing Status Clean, dry, & intact 11/14/2017  3:13 PM   Dressing Type Disk with Chlorhexadine gluconate (CHG); Transparent 11/14/2017  3:13 PM   Action Taken Dressing changed 11/12/2017  6:28 AM   Date Accessed (Medial Site) 11/05/17 11/14/2017  8:20 AM   Access Time (Medial Site) 0825 11/5/2017 10:59 PM   Access Needle Size (Site #1) 20 G 11/10/2017  3:09 AM   Access Needle Length (Medial Site) 1 inch 11/10/2017  3:09 AM   Positive Blood Return (Medial Site) Yes 11/14/2017  8:20 AM   Action Taken (Medial Site) Flushed 11/14/2017  8:20 AM   Alcohol Cap Used Yes 11/14/2017  8:20 AM                           Readmission Risk Assessment Tool Score Low Risk            11       Total Score        3 Has Seen PCP in Last 6 Months (Yes=3, No=0)    2 . Living with Significant Other. Assisted Living. LTAC. SNF. or   Rehab    3 Patient Length of Stay (>5 days = 3)    3 Charlson Comorbidity Score (Age + Comorbid Conditions)        Criteria that do not apply:    IP Visits Last 12 Months (1-3=4, 4=9, >4=11)    Pt. Coverage (Medicare=5 , Medicaid, or Self-Pay=4)       Expected Length of Stay 3d 2h   Actual Length of Stay 9          Opportunity for questions and clarifications were given to the incoming nurse. Patient's bed is in low position, side rails x2, door open PRN, call bell within reach and patient not in distress.       Berneta Feeling

## 2017-11-15 NOTE — PROGRESS NOTES
Problem: Self Care Deficits Care Plan (Adult)  Goal: *Acute Goals and Plan of Care (Insert Text)  Occupational Therapy Goals:  Routine 7 day Re-eval 11/15/2017  Occupational Therapy Goals:  Initiated 11/15/2017  1. Patient will perform grooming seated edge of bed with good balance with supervision/set-up within 7 days. 2. Patient will perform bathing with moderate assistance seated within 7 days. 3. Patient will perform sit to stand with moderate assist in prep for functional ADL transfers within 7 days. Initiated 11/8/2017 discontinue all below goals 11/15/2017 no goals met  1. Patient will perform bathing with supervision/set-up within 7 days. 2. Patient will perform dressing with supervision/set-up within 7 days. 3. Patient will perform toileting with supervision/set-up within 7 days. 4. Patient will transfer from toilet with supervision/set-up using the least restrictive device and appropriate durable medical equipment within 7 days. Occupational Therapy ReEVALUATION  Patient: Lana Connolly (44 y.o. male)  Date: 11/15/2017  Diagnosis: COPD exacerbation ([facility] [address]  Acute respiratory distress <principal problem not specified>       Precautions: Fall    ASSESSMENT :  Based on the objective data described below, the patient presents with significant change in functional ability to stand. Pt was non verbal this session and had limited command following. Pt had flat affect and mostly was non interactive with blank stare. Pt is known to me and normally is conversive and able to interact. Attempted writing on a paper for him and he attempted to read it but did not register what was written. Slight tremors in UB but full body tremors have subsided. Vitals were stable and RR was normal which was an improvement. Good seated balance edge of bed but pt was unable to come to stand with max assist x2 and bed raise. 4 attempts made at standing but pt was unable.   Pt was able to bring cup to mouth with straw to drink but then terminated movement. Pt is performing UB ADLS at a SBA to moderate assist level and lower body ADLS at a total assist level. Recommend SNF at discharge. No goals met and goals have been adjusted. Pt continues to benefit from services. Patient will benefit from skilled intervention to address the above impairments. Patients rehabilitation potential is considered to be Fair  Factors which may influence rehabilitation potential include:   []                None noted  []                Mental ability/status  []                Medical condition  []                Home/family situation and support systems  []                Safety awareness  []                Pain tolerance/management  []                Other:      PLAN :  Recommendations and Planned Interventions:  []                  Self Care Training                  []           Therapeutic Activities  []                  Functional Mobility Training    []           Cognitive Retraining  []                  Therapeutic Exercises           []           Endurance Activities  []                  Balance Training                   []           Neuromuscular Re-Education  []                  Visual/Perceptual Training     []      Home Safety Training  []                  Patient Education                 []           Family Training/Education  []                  Other (comment):    Frequency/Duration: Patient will be followed by occupational therapy 3 times a week to address goals.   Discharge Recommendations: [facility]  Further Equipment Recommendations for Discharge: if pt goes home he will need: hospital bed, wheelchair with swing away leg rests and wheelchair cushion, bedside commode and nanette lift     SUBJECTIVE:   Patient stated: did not verbalize this session    OBJECTIVE DATA SUMMARY:     Cognitive/Behavioral Status:  Neurologic State: Alert (very little interaction)  Orientation Level:  (pt did not communicate today)  Cognition: Decreased attention/concentration;Decreased command following; Impaired decision making  Perception:  (grossly intact)  Perseveration: No perseveration noted       Vision/Perceptual:                                Corrective Lenses: Glasses    Range of Motion:    AROM: Generally decreased, functional  PROM: Generally decreased, functional                    Strength:    Strength: Generally decreased, functional (BUE; grossly decreased non functional BLE)              Coordination:  Coordination: Generally decreased, functional (tremors UE but functional)  Fine Motor Skills-Upper: Left Intact; Right Intact    Gross Motor Skills-Upper: Left Impaired;Right Impaired  Tone & Sensation:    Tone: Normal                         Balance:  Sitting: Impaired; Without support  Sitting - Static: Fair (occasional)  Sitting - Dynamic: Poor (constant support)  Standing: Impaired  Standing - Static: Constant support;Poor  Standing - Dynamic : Poor    Functional Mobility and Transfers for ADLs:  Bed Mobility:  Rolling: Minimum assistance  Supine to Sit: Moderate assistance;Assist x2  Sit to Supine: Moderate assistance;Assist x2  Scooting: Moderate assistance    Transfers:  Sit to Stand: Maximum assistance;Assist x2 (did 3 attempts, not able to come to a full stand on any tria)  Functional Transfers  Toilet Transfer :  (unable )    ADL Assessment:  Feeding: Stand-by assistance    Oral Facial Hygiene/Grooming: Minimum assistance    Bathing: Moderate assistance    Upper Body Dressing: Minimum assistance    Lower Body Dressing: Total assistance    Toileting: Total assistance              ADL Intervention:   focus on sit to stand (attempt at standing statically)  Able to drink from cup with straw at bed level with supervision    Pain:  Pain Scale 1: Numeric (0 - 10)  Pain Intensity 1: 0              Activity Tolerance:     Please refer to the flowsheet for vital signs taken during this treatment.   After treatment:   [] Patient left in no apparent distress sitting up in chair  [x] Patient left in no apparent distress in bed  [x] Call bell left within reach  [x] Nursing notified  [] Caregiver present  [] Bed alarm activated    COMMUNICATION/EDUCATION:   The patients plan of care was discussed with: Physical Therapist, Registered Nurse and patient. []    Home safety education was provided and the patient/caregiver indicated understanding. []    Patient/family have participated as able in goal setting and plan of care. []    Patient/family agree to work toward stated goals and plan of care. []    Patient understands intent and goals of therapy, but is neutral about his/her participation. [x]    Patient is unable to participate in goal setting and plan of care. This patients plan of care is appropriate for delegation to [facility].     Thank you for this referral.  Pancho Leal OTR/L  Time Calculation: 17 mins

## 2017-11-15 NOTE — PROGRESS NOTES
Unit secretary attempted to call ENT consult and reports the ENT needs to see patient in office for sudden hearing loss as their equipment is not mobile.

## 2017-11-15 NOTE — PROGRESS NOTES
Oncology Nursing Communication Tool  7:40 AM  11/15/2017     Bedside shift change report given to Jeremie Suresh RN (incoming nurse) by Jacky George RN (outgoing nurse) on [facility]. Report included the following information SBAR, Kardex, Intake/Output, MAR, Accordion, Recent Results and Med Rec Status. Significant changes during shift: None      Issues for physician to address: None         Oncology Shift Note   Admission Date 11/5/2017   Admission Diagnosis COPD exacerbation ([facility] [address]  Acute respiratory distress   Code Status Partial Code   Consults IP CONSULT TO PULMONOLOGY  IP CONSULT TO HEMATOLOGY  IP CONSULT TO OTOLARYNGOLOGY  IP CONSULT TO PALLIATIVE CARE - PROVIDER      Cardiac Monitoring [x] Yes [] No      Purposeful Hourly Rounding [x] Yes    Robert Score Total Score: 5   Robert score 3 or > [] Bed Alarm [] Avasys [] 1:1 sitter [] Patient refused (Place signed refusal form in chart)      Pain Managed [x] Yes [] No    Key Pain Meds             aspirin (ASPIRIN) 325 mg tablet  (Taking) Take 81 mg by mouth daily. Influenza Vaccine Received Flu Vaccine for Current Season (usually Sept-March): Yes (august 2017)           Oxygen needs?  [x] Room air Oxygen @  []1L    []2L    []3L   []4L    []5L   []6L     Use home O2? [] Yes [x] No  Perform O2 challenge test using  smartphrase (.Homeoxygen)      Last bowel movement Last Bowel Movement Date: 11/12/17      Urinary Catheter             LDAs             Venous Access Device right chest [facility] 11/05/17 Upper chest (subclavicular area, right (Active)   Central Line Being Utilized Yes 11/15/2017  7:32 AM   Criteria for Appropriate Use Limited/no vessel suitable for conventional peripheral access 11/15/2017  3:00 AM   Site Assessment Clean, dry, & intact 11/15/2017  3:00 AM   Date of Last Dressing Change 11/12/17 11/15/2017  3:00 AM   Dressing Status Clean, dry, & intact 11/15/2017  3:00 AM   Dressing Type Disk with Chlorhexadine gluconate (CHG);Tape;Transparent 11/15/2017  3:00 AM   Action Taken Dressing changed 11/12/2017  6:28 AM   Date Accessed (Medial Site) 11/05/17 11/15/2017  3:00 AM   Access Time (Medial Site) 9165 11/5/2017 10:59 PM   Access Needle Size (Site #1) 20 G 11/10/2017  3:09 AM   Access Needle Length (Medial Site) 1 inch 11/10/2017  3:09 AM   Positive Blood Return (Medial Site) Yes 11/15/2017  3:00 AM   Action Taken (Medial Site) Flushed 11/15/2017  3:00 AM   Alcohol Cap Used Yes 11/15/2017  3:00 AM                           Readmission Risk Assessment Tool Score Low Risk            11       Total Score        3 Has Seen PCP in Last 6 Months (Yes=3, No=0)    2 . Living with Significant Other. Assisted Living. LTAC. SNF. or   Rehab    3 Patient Length of Stay (>5 days = 3)    3 Charlson Comorbidity Score (Age + Comorbid Conditions)        Criteria that do not apply:    IP Visits Last 12 Months (1-3=4, 4=9, >4=11)    Pt. Coverage (Medicare=5 , Medicaid, or Self-Pay=4)       Expected Length of Stay 3d 2h   Actual Length of Stay 10          Opportunity for questions and clarifications were given to the incoming nurse. Patient's bed is in low position, side rails x2, door open PRN, call bell within reach and patient not in distress.       Brooklynn Alegre RN

## 2017-11-15 NOTE — PROGRESS NOTES
RN called patients wife Singh Solano to complete MRI screening form as patient is very [facility] and was having difficulty understanding questions. Screening form was completed and printed for patient to review post completion. RN faxed screening form to MRI at [phone].

## 2017-11-16 NOTE — PROGRESS NOTES
Hematology Oncology Progress Note       Follow up for: NSCLC    Chart notes reviewed since last visit. Case discussed with following:    Patient complains of the following:   Markedly winded with efforts to walk with PT. He is extremely winded and breathing hard but O2 sats are actually okay. Today he is drowsy. Overnight he became drowsy and tachy- ABG ordered and Non con head CT done. Today, he was unable to stand up for PT at all. Not following commands very well. Additional concerns noted by the staff:     Patient Vitals for the past 24 hrs:   BP Temp Pulse Resp SpO2   11/16/17 1142 153/85 - (!) 102 - -   11/16/17 1135 153/85 - (!) 102 - -   11/16/17 0804 133/81 97.5 °F (36.4 °C) 97 20 96 %   11/16/17 0732 - - - - 97 %   11/16/17 0316 137/77 98.4 °F (36.9 °C) 91 18 97 %   11/15/17 2338 136/77 97.5 °F (36.4 °C) 94 18 97 %   11/15/17 2135 - - - - 96 %   11/15/17 1937 145/73 97.2 °F (36.2 °C) 85 18 96 %   11/15/17 1613 146/79 98.4 °F (36.9 °C) 81 16 98 %   11/15/17 1526 - - - - 96 %       Review of Systems:  Done- 12 pt ROS attempted, but poor mentation did not allow it                                                                  Physical Examination:  Constitutional Very drowsy, arousable, Total hearing loss. Head Normocephalic; no scars   Eyes Conjunctivae and sclerae are clear and without icterus. Pupils are reactive and equal.   ENMT Sinuses are nontender. No oral exudates, ulcers, masses, thrush or mucositis. Oropharynx clear. Tongue normal.   Neck Supple without masses or thyromegaly. No jugular venous distension. Hematologic/Lymphatic No petechiae or purpura. No tender or palpable lymph nodes noted   Respiratory Tachypneic, distant breadth sounds+ kayli   Cardiovascular Regular rate and rhythm of heart without murmurs, gallops or rubs. Chest / Line Site Chest is symmetric with no chest wall deformities. Abdomen Non-tender, non-distended, no masses, ascites or hepatosplenomegaly.  Good bowel sounds. No guarding or rebound tenderness. No pulsatile masses. Musculoskeletal No tenderness or swelling, normal range of motion without obvious weakness. Extremities No visible deformities, no cyanosis, clubbing or edema. Skin No rashes, scars, or lesions suggestive of malignancy. No petechiae, purpura, or ecchymoses. No excoriations. Neurologic No sensory or motor deficits but not specifically tested. Psychiatric Alert and oriented. Coherent speech. Verbalizes understanding of our discussions today. Noted the brain MRI results- meningioma is no larger . And some dilatation of the ventricular system with no obvious etiology or acute pathology    Labs:  No results found for this or any previous visit (from the past 24 hour(s)). Assessment and Plan:   NSCLC - opdivo on hold for now given current high dose steroid usage. Remians on solumedrol at 40 mg iv Q6hrs- Taper per Dr. Ana Gama. Noted non-con Head CT. Brought to my attention that he has ah/o meningioma. Reviewed Brain MRI with and w/o iv con- meningioma is no larger and some dilatation of the ventricular system- no obvious other pathology     Palliative Care on board     Abrupt onset loss of hearing - followup with Dr. Ivy Ellison missed  Last Tuesday. Will see if he can be seen here given plans for inpt rehab. He is not likely to make any outpatient appt any time soon    Falls - on telemetry to see if associated with arrhythmia. Severe COPD with acute exacerbation - currently being treated.  ? Taper steroids    Ok for Rehab/SNF when ok with Dr. Gustavo Maxwell

## 2017-11-16 NOTE — PROGRESS NOTES
Oncology Nursing Communication Tool  7:52 PM  11/15/2017     Bedside and Verbal shift change report given to TYE Arellano (incoming nurse) by Melissa Sloan (outgoing nurse) on [facility]. Report included the following information SBAR, Kardex, Intake/Output, MAR, Accordion and Recent Results. Significant changes during shift: Patient still needs to have MRI done, screening sheet has been completed and faxed. Issues for physician to address: none       Oncology Shift Note   Admission Date 11/5/2017   Admission Diagnosis COPD exacerbation ([facility] [address]  Acute respiratory distress   Code Status Partial Code   Consults IP CONSULT TO PULMONOLOGY  IP CONSULT TO HEMATOLOGY  IP CONSULT TO OTOLARYNGOLOGY  IP CONSULT TO PALLIATIVE CARE - PROVIDER      Cardiac Monitoring [x] Yes [] No      Purposeful Hourly Rounding [x] Yes    Robert Score Total Score: 5   Robert score 3 or > [] Bed Alarm [] Avasys [] 1:1 sitter [] Patient refused (Place signed refusal form in chart)      Pain Managed [x] Yes [] No    Key Pain Meds             aspirin (ASPIRIN) 325 mg tablet  (Taking) Take 81 mg by mouth daily. Influenza Vaccine Received Flu Vaccine for Current Season (usually Sept-March): Yes (august 2017)           Oxygen needs?  [x] Room air Oxygen @  []1L    []2L    []3L   []4L    []5L   []6L     Use home O2? [] Yes [] No  Perform O2 challenge test using  smartphrase (.Homeoxygen)      Last bowel movement Last Bowel Movement Date: 11/12/17      Urinary Catheter             LDAs             Venous Access Device right chest [facility] 11/05/17 Upper chest (subclavicular area, right (Active)   Central Line Being Utilized Yes 11/15/2017  3:35 PM   Criteria for Appropriate Use Limited/no vessel suitable for conventional peripheral access 11/15/2017  3:35 PM   Site Assessment Clean, dry, & intact 11/15/2017  3:35 PM   Date of Last Dressing Change 11/12/17 11/15/2017  3:35 PM   Dressing Status Clean, dry, & intact 11/15/2017  3:35 PM   Dressing Type Tape;Disk with Chlorhexadine gluconate (CHG); Transparent 11/15/2017  3:35 PM   Action Taken Dressing changed 11/12/2017  6:28 AM   Date Accessed (Medial Site) 11/05/17 11/15/2017  3:00 AM   Access Time (Medial Site) 2255 11/5/2017 10:59 PM   Access Needle Size (Site #1) 20 G 11/10/2017  3:09 AM   Access Needle Length (Medial Site) 1 inch 11/10/2017  3:09 AM   Positive Blood Return (Medial Site) Yes 11/15/2017  3:35 PM   Action Taken (Medial Site) Flushed 11/15/2017  3:35 PM   Alcohol Cap Used Yes 11/15/2017  3:00 AM                           Readmission Risk Assessment Tool Score Low Risk            11       Total Score        3 Has Seen PCP in Last 6 Months (Yes=3, No=0)    2 . Living with Significant Other. Assisted Living. LTAC. SNF. or   Rehab    3 Patient Length of Stay (>5 days = 3)    3 Charlson Comorbidity Score (Age + Comorbid Conditions)        Criteria that do not apply:    IP Visits Last 12 Months (1-3=4, 4=9, >4=11)    Pt. Coverage (Medicare=5 , Medicaid, or Self-Pay=4)       Expected Length of Stay 3d 2h   Actual Length of Stay 10          Opportunity for questions and clarifications were given to the incoming nurse. Patient's bed is in low position, side rails x2, door open PRN, call bell within reach and patient not in distress.       Meagan Chang

## 2017-11-16 NOTE — PROGRESS NOTES
Oncology Interdisciplinary rounds were held today to discuss patient plan of care and outcomes. The following members were present: Nursing and Case Management.     ALOS: 11  DRG GLOS: 3.1    Plan of Care Discharge Disposition   Neb treatments, Nutrition is following  SNF placement--Follow up with Dr Kurtis Jones

## 2017-11-16 NOTE — PROGRESS NOTES
CM notified by nurse pt is clear for discharge per Dr Belkys Michael. CM called and updated [address] of discharge plans. CM called and updated pt wife, Margaret Mccain. She requested Dr Belkys Michael call her with update from tests. CM left message with Dr Luis Gannon office to call Mrs Felisa Juan. CM visited pt to update of plans. Noted, pt was awake but could not hear(neither sides) nor read(noted written) information as he just stared past.     1530: [facility] scheduled to arrive at 1700. CM called Mrs Felisa Juan and updated on transport. She stated she had not heard from Dr Belkys Michael and she was in the hospital parking lot. Nurse updated on report information. Facesheet, H&P& PCS with chart. Nurse to add AVS when printed and present to [facility]. Followup PCP appt on AVS.   Note, CM was unable to make ENT appt. CM spoke with Mrs Felisa Juan who stated she only wanted to make Dr Jerrell Shankar appt if things go well at [facility] and she would also make ENT appt-pt had been seen by Dr Karyle Maxcy in past for testing and followup appt missed was to review results.     Ken Rivera RN, [facility]   [phone]

## 2017-11-16 NOTE — PROGRESS NOTES
Problem: Mobility Impaired (Adult and Pediatric)  Goal: *Acute Goals and Plan of Care (Insert Text)  Physical Therapy Goals  Goals reassessed 11/16/17 and updated below  1. Patient will move from supine to sit and sit to supine , scoot up and down and roll side to side in bed with minimal assistance/contact guard assist within 7 day(s). 2.  Patient will transfer from bed to chair and chair to bed with moderate assistance  using the least restrictive device within 7 day(s). 3.  Patient will perform sit to stand with moderate assistance  within 7 day(s). 4.  Patient will ambulate with moderate assistance  for 25 feet with the least restrictive device within 7 day(s). Initiated 11/7/2017  1. Patient will move from supine to sit and sit to supine , scoot up and down and roll side to side in bed with independence within 7 day(s). 2.  Patient will transfer from bed to chair and chair to bed with independence using the least restrictive device within 7 day(s). 3.  Patient will perform sit to stand with independence within 7 day(s). 4.  Patient will ambulate with modified independence for 100 feet with the least restrictive device within 7 day(s). physical Therapy TREATMENT: WEEKLY REASSESSMENT  Patient: Nalini Patel (50 y.o. male)  Date: 11/16/2017  Diagnosis: COPD exacerbation (HCC)  Acute respiratory distress <principal problem not specified>       Precautions: Fall    ASSESSMENT:  Patient with very limited interaction and non-verbal. Patient with limited participation in therapy this date. Intermittent participation with bed mobility but none with attempts of transferring. Unsafe to continue. Chart review noted a decline in mobility and inconsistent participation/interaction with therapy. Will continue to follow but decrease frequency.    Patient's progression toward goals since last assessment: Patient with a decline in mobility status and goals downgraded this visit     PLAN:  Goals have been updated based on progression since last assessment. Patient continues to benefit from skilled intervention to address the above impairments. Continue to follow the patient 3 times a week to address goals. Planned Interventions:  [x]              Bed Mobility Training             []       Neuromuscular Re-Education  [x]              Transfer Training                   []       Orthotic/Prosthetic Training  [x]              Gait Training                         []       Modalities  [x]              Therapeutic Exercises           []       Edema Management/Control  [x]              Therapeutic Activities            [x]       Patient and Family Training/Education  []              Other (comment):  Discharge Recommendations: Skilled Nursing Facility  Further Equipment Recommendations for Discharge: Defer to SNF     SUBJECTIVE:   Patient non-verbal and limited responses     OBJECTIVE DATA SUMMARY:   Critical Behavior:  Neurologic State: Alert  Orientation Level: Oriented to person, Disoriented to place, Disoriented to situation, Disoriented to time  Cognition: Decreased attention/concentration, Decreased command following  Safety/Judgement: Awareness of environment, Fall prevention, Home safety    Strength:    Generally decreased     Functional Mobility Training:  Bed Mobility:  Rolling: Maximum assistance; Additional time  Supine to Sit: Moderate assistance; Additional time;Assist x2 (flucuating participation from patient during bed mobility)  Sit to Supine: Moderate assistance; Additional time  Scooting: Maximum assistance;Assist x2  Transfers:  Sit to Stand:  (2 attempts but patient not actively participating)  Balance:  Sitting: Impaired  Sitting - Static: Fair (occasional)  Sitting - Dynamic: Poor (constant support)  Standing:  (unable to assess)  Ambulation/Gait Training:   Unable to attempt safely     Pain:    Patient with no verbal c/o pain      Activity Tolerance:   No s/s of VS distress   After treatment:   [] Patient left in no apparent distress sitting up in chair  [x]  Patient left in no apparent distress in bed  [x]  Call bell left within reach  [x]  Nursing notified  []  Caregiver present  []  Bed alarm activated    COMMUNICATION/COLLABORATION:   The patients plan of care was discussed with: Registered Nurse    Eyad Tripathi, PT, DPT   Time Calculation: 12 mins

## 2017-11-16 NOTE — PROGRESS NOTES
Oncology Nursing Communication Tool  7:16 AM  11/16/2017     Bedside shift change report given to Gerard Jimenez RN (incoming nurse) by Chalmers Cheadle, RN (outgoing nurse) on [facility]. Report included the following information SBAR, Kardex, MAR, Accordion and Recent Results. Significant changes during shift: MRI completed      Issues for physician to address: MRI results         Oncology Shift Note   Admission Date 11/5/2017   Admission Diagnosis COPD exacerbation ([facility] [address]  Acute respiratory distress   Code Status Partial Code   Consults IP CONSULT TO PULMONOLOGY  IP CONSULT TO HEMATOLOGY  IP CONSULT TO OTOLARYNGOLOGY  IP CONSULT TO PALLIATIVE CARE - PROVIDER      Cardiac Monitoring [x] Yes [] No      Purposeful Hourly Rounding [x] Yes    Robert Score Total Score: 5   Robert score 3 or > [x] Bed Alarm [] Avasys [] 1:1 sitter [] Patient refused (Place signed refusal form in chart)      Pain Managed [x] Yes [] No    Key Pain Meds             aspirin (ASPIRIN) 325 mg tablet  (Taking) Take 81 mg by mouth daily. Influenza Vaccine Received Flu Vaccine for Current Season (usually Sept-March): Yes (august 2017)           Oxygen needs? [x] Room air Oxygen @  []1L    []2L    []3L   []4L    []5L   []6L     Use home O2? [] Yes [] No  Perform O2 challenge test using  smartphrase (.Homeoxygen)      Last bowel movement Last Bowel Movement Date: 11/12/17      Urinary Catheter             LDAs             Venous Access Device right chest [facility] 11/05/17 Upper chest (subclavicular area, right (Active)   Central Line Being Utilized Yes 11/16/2017  2:46 AM   Criteria for Appropriate Use Limited/no vessel suitable for conventional peripheral access 11/16/2017  2:46 AM   Site Assessment Clean, dry, & intact 11/16/2017  2:46 AM   Date of Last Dressing Change 11/12/17 11/16/2017  2:46 AM   Dressing Status Clean, dry, & intact 11/16/2017  2:46 AM   Dressing Type Disk with Chlorhexadine gluconate (CHG); Transparent;Tape 11/16/2017  2:46 AM   Action Taken Dressing changed 11/12/2017  6:28 AM   Date Accessed (Medial Site) 11/05/17 11/16/2017  2:46 AM   Access Time (Medial Site) 2253 11/5/2017 10:59 PM   Access Needle Size (Site #1) 20 G 11/10/2017  3:09 AM   Access Needle Length (Medial Site) 1 inch 11/10/2017  3:09 AM   Positive Blood Return (Medial Site) Yes 11/16/2017  2:46 AM   Action Taken (Medial Site) Flushed 11/15/2017  8:47 PM   Alcohol Cap Used Yes 11/15/2017  3:00 AM                           Readmission Risk Assessment Tool Score Low Risk            11       Total Score        3 Has Seen PCP in Last 6 Months (Yes=3, No=0)    2 . Living with Significant Other. Assisted Living. LTAC. SNF. or   Rehab    3 Patient Length of Stay (>5 days = 3)    3 Charlson Comorbidity Score (Age + Comorbid Conditions)        Criteria that do not apply:    IP Visits Last 12 Months (1-3=4, 4=9, >4=11)    Pt. Coverage (Medicare=5 , Medicaid, or Self-Pay=4)       Expected Length of Stay 3d 2h   Actual Length of Stay 11          Opportunity for questions and clarifications were given to the incoming nurse. Patient's bed is in low position, side rails x2, door open PRN, call bell within reach and patient not in distress.       Adele Franks RN

## 2017-11-16 NOTE — PROGRESS NOTES
Hospital to [address] [place] [address]                                                                        63 y.o.   male    [facility]   Room: 1115/01    [facility] MEDICAL ONCOLOGY  Unit Phone# :  [phone]      [address]  [facility] [address] [address]  Dept: [phone]  Loc: [phone]                    SITUATION     Admitted:  11/5/2017         Attending Provider:  Murphy العراقي MD       Consultations:  IP CONSULT TO PULMONOLOGY  IP CONSULT TO HEMATOLOGY  IP CONSULT TO OTOLARYNGOLOGY  IP CONSULT TO PALLIATIVE CARE - PROVIDER    PCP:  Murphy العراقي MD   [phone]    Treatment Team: Attending Provider: Murphy العراقي MD; Consulting Provider: Veronika Freedman MD; Care Manager: Kobe Duque RN; Consulting Provider: Walter Foreman MD; Utilization Review: Manuel Polo RN    Admitting Dx:  COPD exacerbation ([address]  Acute respiratory distress       Principal Problem: <principal problem not specified>    * No surgery found * of      BY: * Surgery not found *             ON: * No surgery found *                  Code Status: Partial Code                Advance Directives:   Advance Care Planning 11/13/2017   Patient's Healthcare Decision Maker is: Legal Next of Josr 69   Primary Decision Maker Name Raphael Gerber   Primary Decision Maker Phone Number [phone]   Primary Decision Maker Relationship to Patient Spouse   Confirm Advance Directive None   Patient Would Like to Complete Advance Directive -    (Send w/patient)   No Doesnt Have       Isolation:  There are currently no Active Isolations       MDRO: No current active infections    Pain Medications given:   Lidocaine patch to lower back    Last dose: 11/16/2017 at  1354    Special Equipment needed: no  Type of equipment:      (Not currently on dialysis)  (Not currently on dialysis)  (Not currently on dialysis)     BACKGROUND     Allergies:   Allergies   Allergen Reactions  Amoxicillin Other (comments)     Headache    Hydrochlorothiazide Other (comments)     Hyponatremia       Past Medical History:   Diagnosis Date    Arthritis     Asthma 5/21/2010    B12 deficiency 2/19/2016    Cancer ([facility]     Lung    COPD (chronic obstructive pulmonary disease) ([facility] 5/21/2010    asthma, uses inhalers    Hyperlipidemia 5/21/2010    Hypertension 5/21/2010    Other ill-defined conditions(799.89) 10/31/13    CURRENTLY BEING TRX FOR COLD    Other ill-defined conditions(799.89)     AVASCULAR NECROSIS OF HIPS/ bilateral hip replacement        Past Surgical History:   Procedure Laterality Date    HX GI      COLONOSCOPY    HX HEENT      WISDOM TEETH    HX HIP REPLACEMENT Right 2014    Dr. Roya Montelongo  11/2014    L hip    HX LUMBAR LAMINECTOMY  1990s    HX ORTHOPAEDIC  25 years ago    lumbar laminectomy    HX ORTHOPAEDIC      left hip replacement    HX OTHER SURGICAL      egd    HX OTHER SURGICAL      bronchoscopy with biopsy    TOTAL HIP ARTHROPLASTY Right 11/2013       Prescriptions Prior to Admission   Medication Sig    SPIRIVA WITH HANDIHALER 18 mcg inhalation capsule INHALE CONTENTS OF 1 CAPSULE THROUGH HANDIHALER DEVICE DAILY    albuterol (PROVENTIL VENTOLIN) 2.5 mg /3 mL (0.083 %) nebulizer solution USE VIA NEBULIZER EVERY FOUR HOURS AS NEEDED FOR WHEEZING AS DIRECTED    ERGOCALCIFEROL, VITAMIN D2, (VITAMIN D2 PO) Take 1.25 mg by mouth. Once per week    NIVOLUMAB (OPDIVO IV) by IntraVENous route.  aspirin (ASPIRIN) 325 mg tablet Take 81 mg by mouth daily.  predniSONE (DELTASONE) 5 mg tablet Take  by mouth.  lisinopril (PRINIVIL, ZESTRIL) 10 mg tablet Take 1 Tab by mouth daily.  beclomethasone (QVAR) 80 mcg/actuation aero Take 2 Puffs by inhalation two (2) times a day.  albuterol (PROAIR HFA) 90 mcg/actuation inhaler Take 2 Puffs by inhalation every four (4) hours as needed for Wheezing.     gabapentin (NEURONTIN) 100 mg capsule Take 2 Caps by mouth two (2) times a day. Indications: nerve damage    OTHER Chemo treatment every Thursday    pantoprazole (PROTONIX) 40 mg tablet        Hard scripts included in transfer packet no    Vaccinations:    Immunization History   Administered Date(s) Administered    Influenza Vaccine 11/14/2014, 08/15/2017    Influenza Vaccine (Quad) 10/16/2015    Influenza Vaccine PF 10/29/2013    Influenza Vaccine Split 09/06/2012    Tdap 10/16/2015    ZZZ-RETIRED (DO NOT USE) Pneumococcal Vaccine (Unspecified Type) 01/01/2010       Readmission Risks:    Known Risks: COPD, lung cancer        The Charlson CoMorbit[employer] Index tool is an evidenced based tool that has more automatic generated information. The tool looks at many different items such as the age of the patient, how many times they were admitted in the last calendar year, current length of stay in the hospital and their diagnosis. All of these items are pulled automatically from information documented in the chart from various places and will generate a score that predicts whether a patient is at low (less than 13), medium (13-20) or high (21 or greater) risk of being readmitted. ASSESSMENT                Temp: 98.4 °F (36.9 °C) (11/16/17 1558) Pulse (Heart Rate): (!) 117 (11/16/17 1558)     Resp Rate: 18 (11/16/17 1558)           BP: 137/70 (11/16/17 1558)     O2 Sat (%): 96 % (11/16/17 1558)     Weight: 74.7 kg (164 lb 11.2 oz)    Height: 6' (182.9 cm) (11/05/17 1455)       If above not within 1 hour of discharge:    BP:_____  P:____  R:____ T:_____ O2 Sat: ___%  O2: ______    Active Orders   Diet    DIET CARDIAC Regular         Orientation: oriented to person.   Hard of hearing    Active Behaviors: None                                   Active Lines/Drains:  (Peg Tube / Baker / CL or S/L?): no    Urinary Status: Voiding, Incontinent briefs     Last BM: Last Bowel Movement Date: 11/12/17     Skin Integrity: Abrasion (located on scrotum : nurse notified) Mobility: Very limited   Weight Bearing Status: WBAT (Weight Bearing as Tolerated)      Gait Training  Assistive Device: Gait belt, Walker, rolling  Ambulation - Level of Assistance: Minimal assistance, Assist x2  Distance (ft):  (not able to take any steps today)         Lab Results   Component Value Date/Time    Glucose 137 11/13/2017 04:26 AM    Hemoglobin A1c 5.5 05/05/2015 07:32 AM    INR 1.1 12/20/2014 10:15 PM    INR 1.0 11/25/2014 03:47 AM    HGB 13.0 11/13/2017 04:26 AM    HGB 10.7 11/09/2017 05:38 AM        RECOMMENDATION     See After Visit Summary (AVS) for:  · Discharge instructions  · After [address]   · Special equipment needed (entered pre-discharge by Care Management)  · Medication Reconciliation    · Follow up Appointment(s)         Report given/sent by:  Ginger Hays                    Verbal report given to: [facility] at [facility]  FAXED to:           Estimated discharge time:  11/16/2017 at 1700

## 2017-11-16 NOTE — PROGRESS NOTES
I have reviewed discharge instructions with the patient and spouse. The patient and spouse verbalized understanding. Discharge medications reviewed with patient and spouse and appropriate educational materials and side effects teaching were provided. Follow-up appointments reviewed with patient. Opportunity for questions or concerns given. Venous access removed without difficulty, pt tolerated well. Patients belongings gathered and sent with patient. Patient is being transported via [facility] to [facility]. Patient in no apparent distress at time of discharge. Patient is ready for discharge.

## 2017-11-16 NOTE — PROGRESS NOTES
RN spoke with Dr. Javier Weston regarding plan for patient. Per Dr. Javier Weston he is ok with patient being discharged as long as Dr. Fern Schirmer has no other concerns. RN will follow up with Dr. Fern Schirmer. Dr. Javier Weston gave RN verbal order for discharge.

## 2017-11-16 NOTE — PROGRESS NOTES
Dr. Margaux Muir informed of patients tachycardia and MEWs score. No further orders received and patient ok to be discharged.

## 2017-11-16 NOTE — CONSULTS
Palliative Medicine Consult  Tom: [phone] ([phone]    Patient Name: Yvan Nguyễn  YOB: 1955    Date of Initial Consult: 11/13/17  Reason for Consult: overwhelming symptoms  Requesting Provider: Belkys Michael   Primary Care Physician: Constantino Mendoza MD     SUMMARY:   Yvan Nguyễn is a 63 y.o. gentleman with a past history of COPD, NSCLC undergoing treatment with biologic agent and sp VATS resection, arthritis with LBP and radiculopathy, recent weakness and frequent falls, who was admitted on 11/5/2017 from home with a diagnosis of AE COPD, weakness, falls. Current medical issues leading to Palliative Medicine involvement include: acute and chronic progressive medical illness, overwhelming symptoms. PALLIATIVE DIAGNOSES:   1. Shortness of breath  2. Low back pain, radiculopathy  3. Debility  4. Nausea - intermittent  5. AE COPD  6. NSCLC     PLAN:   1. Visited w/ pt in his rm. No family at bedside. 2. Pt does not express any complaints or issues. No staff concerns at this time. 3. Continue Tylenol, lidocaine patch, continue gabapentin  4. Continue maximal medical tx for COPD, nebs, steroids  5. Continue efforts at strengthening, even minimal improvement may help with pain / balance  6. Code status is LIMITED RESUSCITATION per active EMR order  7. Regarding goals of care, continued efforts at rehab / strengthening, work on symptoms, try to get back to more functional / steady state, resume cancer treatment if possible. Yes to short term intubation  8. Plan to f/u w/ pt tomorrow. We will try to reach out to family and continue to establish therapeutic alliance w/ pt and family.    9. Communicated plan of care with: Palliative IDT, bedside TYE Cid)     GOALS OF CARE / TREATMENT PREFERENCES:   [====Goals of Care====]  GOALS OF CARE:  Patient / health care proxy stated goals: continued efforts at rehab / strengthening, work on symptoms, try to get back to more functional / steady state, resume cancer treatment if possible  ok with short term intubation    TREATMENT PREFERENCES:   Code Status: Partial Code   Code status is LIMITED RESUSCITATION    Advance Care Planning:  Advance Care Planning 11/13/2017   Patient's Healthcare Decision Maker is: Legal Next of Josr Last   Primary Decision Maker Name Raysa Mccord   Primary Decision Maker Phone Number [phone]   Primary Decision Maker Relationship to Patient Spouse   Confirm Advance Directive None   Patient Would Like to Complete Advance Directive -       Other:    The palliative care team has discussed with patient / health care proxy about goals of care / treatment preferences for patient.  [====Goals of Care====]     HISTORY:     History obtained from: EMR, bedside RN    CHIEF COMPLAINT: none relayed    HPI/SUBJECTIVE:    The patient is:   Participatory but limited engagement in conversation  [] Verbal and participatory  [] Non-participatory due to:   No o/n events or issues. Per bedside RN, no staff concerns at this time. 63 yom with above history and current issues. On assessment, he is verbal but hx is severely limited by [facility]. Pt actually denies any pain at this time. Per wife, he's had hx of low back pain radiating to legs, which is thought to be arthritis. We reviewed various tx he's tried including tylenol (some benefit). Moderate dyspnea and fatigue. Intermittent nausea.      Clinical Pain Assessment (nonverbal scale for severity on nonverbal patients):   [++++ Clinical Pain Assessment++++]  [++++Pain Severity++++]: Pain: 0 (below is best description I can get for when pt has pain)  [++++Pain Character++++]: ache  [++++Pain Duration++++]: months  [++++Pain Effect++++]: hard to get oob  [++++Pain Factors++++]: better somewhat with tylenol  [++++Pain Frequency++++]: daily, but apparently intermittent  [++++Pain Location++++]: low back, down leg occasionally  [++++ Clinical Pain Assessment++++]  Duration: for how long has pt been experiencing pain (e.g., 2 days, 1 month, years)  Frequency: how often pain is an issue (e.g., several times per day, once every few days, constant)     FUNCTIONAL ASSESSMENT:     Palliative Performance Scale (PPS):  PPS: 40       PSYCHOSOCIAL/SPIRITUAL SCREENING:     Advance Care Planning:  Advance Care Planning 11/13/2017   Patient's Healthcare Decision Maker is: Legal Next of Josr Last   Primary Decision Maker Name Harish Rodriguez   Primary Decision Maker Phone Number [phone]   Primary Decision Maker Relationship to Patient Spouse   Confirm Advance Directive None   Patient Would Like to Complete Advance Directive -        Any spiritual / Gnosticist concerns:  [] Yes /  [x] No    Caregiver Burnout:  [] Yes /  [] No /  [x] No Caregiver Present       Anticipatory grief assessment:   [] Normal  / [] Maladaptive       ESAS Anxiety: Anxiety: 0    ESAS Depression: Depression: 0        REVIEW OF SYSTEMS:     Positive and pertinent negative findings in ROS are noted above in HPI. The following systems were [x] reviewed / [] unable to be reviewed as noted in HPI  Other findings are noted below. Systems: constitutional, ears/nose/mouth/throat, respiratory, gastrointestinal, genitourinary, musculoskeletal, integumentary, neurologic, psychiatric, endocrine. Positive findings noted below. Modified ESAS Completed by: provider   Fatigue: 0 Drowsiness: 0   Depression: 0 Pain: 0   Anxiety: 0 Nausea: 0   Anorexia: 0 Dyspnea: 0     Constipation: No     Stool Occurrence(s): 0        PHYSICAL EXAM:     From RN flowsheet:  Wt Readings from Last 3 Encounters:   11/15/17 164 lb 11.2 oz (74.7 kg)   11/15/17 164 lb 11.2 oz (74.7 kg)   10/09/17 157 lb (71.2 kg)     Blood pressure 137/70, pulse (!) 117, temperature 98.4 °F (36.9 °C), resp. rate 18, height 6' (1.829 m), weight 164 lb 11.2 oz (74.7 kg), SpO2 96 %.     Pain Scale 1: Numeric (0 - 10)  Pain Intensity 1: 0     Pain Location 1: Leg  Pain Orientation 1: Right  Pain Description 1: Aching  Pain Intervention(s) 1: Medication (see MAR)    General: appears comfortable, lying in bed w/ HOB elevated, in NAD  HEENT: NC/AT, MMM, wears glasses  Respiratory: breathing unlabored, symmetric  Neurologic: awake, alert, [place]  Is not verbal but engages in conversation by nodding and shaking head  Psychiatric: significantly blunted affect  No obvious signs of agitation or anxiety     HISTORY:     Active Problems:    Acute respiratory distress (11/5/2017)      COPD exacerbation ([facility] (11/5/2017)      Past Medical History:   Diagnosis Date    Arthritis     Asthma 5/21/2010    B12 deficiency 2/19/2016    Cancer ([facility]     Lung    COPD (chronic obstructive pulmonary disease) ([facility] 5/21/2010    asthma, uses inhalers    Hyperlipidemia 5/21/2010    Hypertension 5/21/2010    Other ill-defined conditions(799.89) 10/31/13    CURRENTLY BEING TRX FOR COLD    Other ill-defined conditions(799.89)     AVASCULAR NECROSIS OF HIPS/ bilateral hip replacement       Past Surgical History:   Procedure Laterality Date    HX GI      COLONOSCOPY    HX HEENT      WISDOM TEETH    HX HIP REPLACEMENT Right 2014    Dr. Marija Jose  11/2014    L hip    HX LUMBAR LAMINECTOMY  1990s    HX ORTHOPAEDIC  25 years ago    lumbar laminectomy    HX ORTHOPAEDIC      left hip replacement    HX OTHER SURGICAL      egd    HX OTHER SURGICAL      bronchoscopy with biopsy    TOTAL HIP ARTHROPLASTY Right 11/2013      Family History   Problem Relation Age of Onset    Diabetes Father      father    Lung Disease Mother      COPD    Hypertension Mother     Obesity Sister     Anesth Problems Neg Hx       History reviewed, no pertinent family history.   Social History   Substance Use Topics    Smoking status: Current Every Day Smoker     Packs/day: 0.25     Years: 30.00     Types: Cigarettes    Smokeless tobacco: Never Used    Alcohol use 12.0 oz/week     24 Cans of beer per week      Comment: per week     Allergies Allergen Reactions    Amoxicillin Other (comments)     Headache    Hydrochlorothiazide Other (comments)     Hyponatremia      Current Facility-Administered Medications   Medication Dose Route Frequency    albuterol-ipratropium (DUO-NEB) 2.5 MG-0.5 MG/3 ML  3 mL Nebulization Q6HWA RT    acetaminophen (TYLENOL) tablet 650 mg  650 mg Oral Q8H    lidocaine (LIDODERM) 5 % patch 1 Patch  1 Patch TransDERmal Q24H    fluticasone-vilanterol (BREO ELLIPTA) 100mcg-25mcg/puff  1 Puff Inhalation DAILY    aluminum & magnesium hydroxide-simethicone (MYLANTA II) oral suspension 30 mL  30 mL Oral Q4H PRN    guaiFENesin ER (MUCINEX) tablet 600 mg  600 mg Oral Q12H    methylPREDNISolone (PF) (SOLU-MEDROL) injection 40 mg  40 mg IntraVENous Q6H    gabapentin (NEURONTIN) capsule 200 mg  200 mg Oral BID    lisinopril (PRINIVIL, ZESTRIL) tablet 10 mg  10 mg Oral DAILY    umeclidinium (INCRUSE ELLIPTA) 62.5 mcg/actuation  1 Puff Inhalation DAILY    albuterol (PROVENTIL VENTOLIN) nebulizer solution 2.5 mg  2.5 mg Nebulization Q4H PRN    nicotine (NICODERM CQ) 7 mg/24 hr patch 1 Patch  1 Patch TransDERmal DAILY    sodium chloride (NS) flush 5-10 mL  5-10 mL IntraVENous Q8H    sodium chloride (NS) flush 5-10 mL  5-10 mL IntraVENous PRN    ondansetron (ZOFRAN) injection 4 mg  4 mg IntraVENous Q4H PRN    enoxaparin (LOVENOX) injection 40 mg  40 mg SubCUTAneous Q24H    aspirin chewable tablet 81 mg  81 mg Oral DAILY    klack's mouthwash oral suspension (COMPOUNDED)  5 mL Oral QID    diazePAM (VALIUM) tablet 5 mg  5 mg Oral Q1H PRN    diazePAM (VALIUM) tablet 10 mg  10 mg Oral D3H PRN    folic acid (FOLVITE) tablet 1 mg  1 mg Oral DAILY    thiamine (B-1) tablet 100 mg  100 mg Oral DAILY    therapeutic multivitamin (THERAGRAN) tablet 1 Tab  1 Tab Oral DAILY          LAB AND IMAGING FINDINGS:     Lab Results   Component Value Date/Time    WBC 10.0 11/13/2017 04:26 AM    HGB 13.0 11/13/2017 04:26 AM    PLATELET 015 11/13/2017 04:26 AM     Lab Results   Component Value Date/Time    Sodium 136 11/13/2017 04:26 AM    Potassium 4.0 11/13/2017 04:26 AM    Chloride 102 11/13/2017 04:26 AM    CO2 26 11/13/2017 04:26 AM    BUN 51 11/13/2017 04:26 AM    Creatinine 1.18 11/13/2017 04:26 AM    Calcium 8.0 11/13/2017 04:26 AM    Magnesium 1.7 11/06/2017 05:27 AM    Phosphorus 2.8 11/06/2017 05:27 AM      Lab Results   Component Value Date/Time    AST (SGOT) 33 11/13/2017 04:26 AM    Alk. phosphatase 47 11/13/2017 04:26 AM    Protein, total 6.0 11/13/2017 04:26 AM    Albumin 2.9 11/13/2017 04:26 AM    Globulin 3.1 11/13/2017 04:26 AM     Lab Results   Component Value Date/Time    INR 1.1 12/20/2014 10:15 PM    Prothrombin time 11.4 12/20/2014 10:15 PM      Lab Results   Component Value Date/Time    Ferritin 1413 06/30/2016 04:00 AM      Lab Results   Component Value Date/Time    pH 7.42 11/13/2017 05:30 PM    PCO2 31 11/13/2017 05:30 PM    PO2 93 11/13/2017 05:30 PM     No components found for: [place]   Lab Results   Component Value Date/Time    CK 75 11/05/2017 03:09 PM    CK - MB 2.4 11/05/2017 03:09 PM                Total time: 25 min  Counseling / coordination time, spent as noted above: 15 min  > 50% counseling / coordination?: yes    Prolonged service was provided for  []30 min   []75 min in face to face time in the presence of the patient, spent as noted above. Time Start:   Time End:   Note: this can only be billed with 92828 (initial) or 06273 (follow up). If multiple start / stop times, list each separately.

## 2017-11-16 NOTE — PROGRESS NOTES
PCP f/u is scheduled with Dr Denver Goad for Dec 8 at 10;45am    Waiting on a call back from Dr Wilfred Arrington f/u with Dr Ana Durham is scheduled for Dec 12 at 10;15am